# Patient Record
Sex: FEMALE | Race: WHITE | Employment: FULL TIME | URBAN - METROPOLITAN AREA
[De-identification: names, ages, dates, MRNs, and addresses within clinical notes are randomized per-mention and may not be internally consistent; named-entity substitution may affect disease eponyms.]

---

## 2017-01-07 ENCOUNTER — TELEPHONE (OUTPATIENT)
Dept: FAMILY MEDICINE CLINIC | Facility: CLINIC | Age: 57
End: 2017-01-07

## 2017-01-07 RX ORDER — LISINOPRIL 40 MG/1
TABLET ORAL
Qty: 30 TABLET | Refills: 0 | Status: SHIPPED | OUTPATIENT
Start: 2017-01-07 | End: 2017-02-09

## 2017-01-07 NOTE — TELEPHONE ENCOUNTER
Requesting Lisinopril refill    Hypertensive Medications  Protocol Criteria:  · Appointment scheduled in the past 6 months or in the next 3 months  · BMP or CMP in the past 12 months  · Creatinine result < 2  Recent Visits       Provider Department Primary

## 2017-01-11 RX ORDER — LISINOPRIL 40 MG/1
40 TABLET ORAL
Qty: 30 TABLET | Refills: 2 | Status: SHIPPED | OUTPATIENT
Start: 2017-01-11 | End: 2017-04-26

## 2017-01-11 NOTE — TELEPHONE ENCOUNTER
From: Barbara White  To: Van Garcia MD  Sent: 1/7/2017 10:21 AM CST  Subject: Medication Renewal Request    Original authorizing provider: Nae Philippe, [address]  would like a refill of the following medications:  LISINOPRIL 40 MG Or

## 2017-01-16 NOTE — TELEPHONE ENCOUNTER
Dr. Fabi Velázquez, looks like this patient has scheduled an appointment with you on 1/26/17 @8:30am

## 2017-01-26 ENCOUNTER — APPOINTMENT (OUTPATIENT)
Dept: LAB | Age: 57
End: 2017-01-26
Attending: FAMILY MEDICINE
Payer: COMMERCIAL

## 2017-01-26 ENCOUNTER — OFFICE VISIT (OUTPATIENT)
Dept: FAMILY MEDICINE CLINIC | Facility: CLINIC | Age: 57
End: 2017-01-26

## 2017-01-26 VITALS
SYSTOLIC BLOOD PRESSURE: 109 MMHG | DIASTOLIC BLOOD PRESSURE: 70 MMHG | HEIGHT: 64.53 IN | HEART RATE: 83 BPM | TEMPERATURE: 99 F | BODY MASS INDEX: 33.02 KG/M2 | WEIGHT: 195.81 LBS | RESPIRATION RATE: 16 BRPM

## 2017-01-26 DIAGNOSIS — Z00.00 ROUTINE PHYSICAL EXAMINATION: ICD-10-CM

## 2017-01-26 DIAGNOSIS — E78.00 HYPERCHOLESTEROLEMIA: ICD-10-CM

## 2017-01-26 DIAGNOSIS — M25.511 RIGHT SHOULDER PAIN, UNSPECIFIED CHRONICITY: ICD-10-CM

## 2017-01-26 DIAGNOSIS — E66.9 NON MORBID OBESITY, UNSPECIFIED OBESITY TYPE: ICD-10-CM

## 2017-01-26 DIAGNOSIS — Z11.59 ENCOUNTER FOR HEPATITIS C SCREENING TEST FOR LOW RISK PATIENT: ICD-10-CM

## 2017-01-26 DIAGNOSIS — J45.20 MILD INTERMITTENT ASTHMA WITHOUT COMPLICATION: ICD-10-CM

## 2017-01-26 DIAGNOSIS — Z12.31 ENCOUNTER FOR SCREENING MAMMOGRAM FOR BREAST CANCER: Primary | ICD-10-CM

## 2017-01-26 DIAGNOSIS — G56.91 NEUROPATHY OF RIGHT UPPER EXTREMITY: ICD-10-CM

## 2017-01-26 DIAGNOSIS — I10 ESSENTIAL HYPERTENSION WITH GOAL BLOOD PRESSURE LESS THAN 140/90: ICD-10-CM

## 2017-01-26 LAB
ALBUMIN SERPL BCP-MCNC: 4.3 G/DL (ref 3.5–4.8)
ALBUMIN/GLOB SERPL: 1.7 {RATIO} (ref 1–2)
ALP SERPL-CCNC: 118 U/L (ref 32–100)
ALT SERPL-CCNC: 23 U/L (ref 14–54)
ANION GAP SERPL CALC-SCNC: 9 MMOL/L (ref 0–18)
AST SERPL-CCNC: 20 U/L (ref 15–41)
BILIRUB SERPL-MCNC: 0.9 MG/DL (ref 0.3–1.2)
BUN SERPL-MCNC: 9 MG/DL (ref 8–20)
BUN/CREAT SERPL: 13.6 (ref 10–20)
CALCIUM SERPL-MCNC: 9.7 MG/DL (ref 8.5–10.5)
CHLORIDE SERPL-SCNC: 103 MMOL/L (ref 95–110)
CHOLEST SERPL-MCNC: 205 MG/DL (ref 110–200)
CO2 SERPL-SCNC: 27 MMOL/L (ref 22–32)
CREAT SERPL-MCNC: 0.66 MG/DL (ref 0.5–1.5)
GLOBULIN PLAS-MCNC: 2.5 G/DL (ref 2.5–3.7)
GLUCOSE SERPL-MCNC: 95 MG/DL (ref 70–99)
HDLC SERPL-MCNC: 51 MG/DL
LDLC SERPL CALC-MCNC: 110 MG/DL (ref 0–99)
NONHDLC SERPL-MCNC: 154 MG/DL
OSMOLALITY UR CALC.SUM OF ELEC: 286 MOSM/KG (ref 275–295)
POTASSIUM SERPL-SCNC: 3.8 MMOL/L (ref 3.3–5.1)
PROT SERPL-MCNC: 6.8 G/DL (ref 5.9–8.4)
SODIUM SERPL-SCNC: 139 MMOL/L (ref 136–144)
TRIGL SERPL-MCNC: 219 MG/DL (ref 1–149)
TSH SERPL-ACNC: 1.58 UIU/ML (ref 0.34–5.6)

## 2017-01-26 PROCEDURE — 80061 LIPID PANEL: CPT

## 2017-01-26 PROCEDURE — 36415 COLL VENOUS BLD VENIPUNCTURE: CPT

## 2017-01-26 PROCEDURE — 86803 HEPATITIS C AB TEST: CPT

## 2017-01-26 PROCEDURE — 99396 PREV VISIT EST AGE 40-64: CPT | Performed by: FAMILY MEDICINE

## 2017-01-26 PROCEDURE — 80053 COMPREHEN METABOLIC PANEL: CPT

## 2017-01-26 PROCEDURE — 84443 ASSAY THYROID STIM HORMONE: CPT

## 2017-01-26 PROCEDURE — 90471 IMMUNIZATION ADMIN: CPT | Performed by: FAMILY MEDICINE

## 2017-01-26 PROCEDURE — 90715 TDAP VACCINE 7 YRS/> IM: CPT | Performed by: FAMILY MEDICINE

## 2017-01-26 NOTE — PROGRESS NOTES
HPI:    Patient ID: Leslie Settler is a 61year old female. HPI    Review of Systems   Constitutional: Negative. Respiratory: Negative. Cardiovascular: Negative. Gastrointestinal: Negative. Musculoskeletal: Positive for arthralgias.    Skin irregular pigmented nevus unchanged from previous exams.               ASSESSMENT/PLAN:   Routine physical examination  Encounter for screening mammogram for breast cancer  (primary encounter diagnosis)  Essential hypertension with goal blood pressure less USE  ORTHOPEDIC - INTERNAL  LIZBETH SCREENING BILAT (CPT=77067)       XW#[id number]

## 2017-01-27 LAB — HCV AB SERPL QL IA: NONREACTIVE

## 2017-02-07 RX ORDER — VERAPAMIL HYDROCHLORIDE 240 MG/1
TABLET, FILM COATED, EXTENDED RELEASE ORAL
Qty: 90 TABLET | Refills: 3 | Status: SHIPPED | OUTPATIENT
Start: 2017-02-07 | End: 2017-09-01 | Stop reason: SINTOL

## 2017-02-09 PROBLEM — M25.511 ACUTE PAIN OF RIGHT SHOULDER: Status: ACTIVE | Noted: 2017-02-09

## 2017-02-09 PROBLEM — M75.101 ROTATOR CUFF SYNDROME, RIGHT: Status: ACTIVE | Noted: 2017-02-09

## 2017-02-09 RX ORDER — LISINOPRIL 40 MG/1
TABLET ORAL
Qty: 30 TABLET | Refills: 11 | Status: SHIPPED | OUTPATIENT
Start: 2017-02-09 | End: 2017-07-17

## 2017-04-08 RX ORDER — ATORVASTATIN CALCIUM 10 MG/1
TABLET, FILM COATED ORAL
Qty: 90 TABLET | Refills: 0 | Status: SHIPPED | OUTPATIENT
Start: 2017-04-08 | End: 2017-06-13

## 2017-04-19 RX ORDER — MONTELUKAST SODIUM 10 MG/1
TABLET ORAL
Qty: 30 TABLET | Refills: 11 | Status: SHIPPED | OUTPATIENT
Start: 2017-04-19 | End: 2018-04-10

## 2017-04-19 NOTE — TELEPHONE ENCOUNTER
Refill Protocol Appointment Criteria; PLEASE ADVISE ON REFILL. THANKS.   · Appointment scheduled in the past 6 months or in the next 3 months  Recent Visits       Provider Department Primary Dx    2 months ago Annie Mendez MD [facility], [address]

## 2017-04-27 RX ORDER — LISINOPRIL 40 MG/1
TABLET ORAL
Qty: 30 TABLET | Refills: 2 | Status: SHIPPED | OUTPATIENT
Start: 2017-04-27 | End: 2017-07-15

## 2017-04-27 NOTE — TELEPHONE ENCOUNTER
Hypertensive Medications  Protocol Criteria:  · Appointment scheduled in the past 6 months or in the next 3 months  · BMP or CMP in the past 12 months  · Creatinine result < 2  Recent Visits       Provider Department Primary Dx    3 months ago Jade Campos

## 2017-04-29 ENCOUNTER — LAB ENCOUNTER (OUTPATIENT)
Dept: LAB | Facility: HOSPITAL | Age: 57
End: 2017-04-29
Attending: ORTHOPAEDIC SURGERY
Payer: COMMERCIAL

## 2017-04-29 DIAGNOSIS — Z01.810 PRE-OPERATIVE CARDIOVASCULAR EXAMINATION: Primary | ICD-10-CM

## 2017-04-29 DIAGNOSIS — Z01.818 PRE-PROCEDURAL EXAMINATION: ICD-10-CM

## 2017-04-29 PROCEDURE — 80048 BASIC METABOLIC PNL TOTAL CA: CPT

## 2017-04-29 PROCEDURE — 93010 ELECTROCARDIOGRAM REPORT: CPT | Performed by: ORTHOPAEDIC SURGERY

## 2017-04-29 PROCEDURE — 93005 ELECTROCARDIOGRAM TRACING: CPT

## 2017-04-29 PROCEDURE — 36415 COLL VENOUS BLD VENIPUNCTURE: CPT

## 2017-05-09 RX ORDER — ATORVASTATIN CALCIUM 10 MG/1
TABLET, FILM COATED ORAL
Qty: 90 TABLET | Refills: 3 | Status: SHIPPED | OUTPATIENT
Start: 2017-05-09 | End: 2018-07-26

## 2017-05-22 PROBLEM — Z47.89 SURGICAL AFTERCARE, MUSCULOSKELETAL SYSTEM: Status: ACTIVE | Noted: 2017-05-22

## 2017-06-12 ENCOUNTER — OFFICE VISIT (OUTPATIENT)
Dept: FAMILY MEDICINE CLINIC | Facility: CLINIC | Age: 57
End: 2017-06-12

## 2017-06-12 VITALS
BODY MASS INDEX: 33.29 KG/M2 | HEIGHT: 64 IN | HEART RATE: 85 BPM | TEMPERATURE: 98 F | OXYGEN SATURATION: 99 % | WEIGHT: 195 LBS | RESPIRATION RATE: 14 BRPM | DIASTOLIC BLOOD PRESSURE: 88 MMHG | SYSTOLIC BLOOD PRESSURE: 130 MMHG

## 2017-06-12 DIAGNOSIS — R30.0 DYSURIA: Primary | ICD-10-CM

## 2017-06-12 DIAGNOSIS — N39.0 URINARY TRACT INFECTION WITH HEMATURIA, SITE UNSPECIFIED: ICD-10-CM

## 2017-06-12 DIAGNOSIS — R31.9 URINARY TRACT INFECTION WITH HEMATURIA, SITE UNSPECIFIED: ICD-10-CM

## 2017-06-12 PROCEDURE — 99213 OFFICE O/P EST LOW 20 MIN: CPT | Performed by: PHYSICIAN ASSISTANT

## 2017-06-12 PROCEDURE — 87186 SC STD MICRODIL/AGAR DIL: CPT | Performed by: PHYSICIAN ASSISTANT

## 2017-06-12 PROCEDURE — 81003 URINALYSIS AUTO W/O SCOPE: CPT | Performed by: PHYSICIAN ASSISTANT

## 2017-06-12 PROCEDURE — 87086 URINE CULTURE/COLONY COUNT: CPT | Performed by: PHYSICIAN ASSISTANT

## 2017-06-12 PROCEDURE — 87077 CULTURE AEROBIC IDENTIFY: CPT | Performed by: PHYSICIAN ASSISTANT

## 2017-06-12 RX ORDER — CIPROFLOXACIN 500 MG/1
500 TABLET, FILM COATED ORAL 2 TIMES DAILY
Qty: 10 TABLET | Refills: 0 | Status: SHIPPED | OUTPATIENT
Start: 2017-06-12 | End: 2017-06-17

## 2017-06-12 NOTE — PROGRESS NOTES
CHIEF COMPLAINT:   Patient presents with:  UTI: x last night, worsening      HPI:   Cathy Kaur is a 61year old female who presents with symptoms of UTI. Complaining of urinary frequency, urgency, dysuria since last night.  Symptoms have been worsen Comment: moderate intake, occasionally        REVIEW OF SYSTEMS:   GENERAL: Denies fever, chills, or body aches; normal appetite  SKIN: no rashes, no skin wounds or ulcers.   CARDIOVASCULAR: denies chest pain or palpitations  LUNGS: denies shortness total) by mouth 2 (two) times daily. Risk and benefits of medication discussed. Stressed importance of completing full course of antibiotic. The patient indicates understanding of these issues and agrees to the plan.   The patient is asked to foll

## 2017-06-12 NOTE — PATIENT INSTRUCTIONS
We will send out a urine culture and contact you with the results  Please follow up with your PCP if no improvement within 5-7 days. Go directly to the ER for any acute worsening of symptoms. · Complete full course of antibiotics.   · Over the counter Tyl

## 2017-06-13 RX ORDER — ATORVASTATIN CALCIUM 10 MG/1
10 TABLET, FILM COATED ORAL
Qty: 90 TABLET | Refills: 3 | Status: CANCELLED | OUTPATIENT
Start: 2017-06-13

## 2017-06-13 RX ORDER — ATORVASTATIN CALCIUM 10 MG/1
TABLET, FILM COATED ORAL
Qty: 90 TABLET | Refills: 3 | Status: SHIPPED | OUTPATIENT
Start: 2017-06-13 | End: 2017-09-20

## 2017-07-17 RX ORDER — LISINOPRIL 40 MG/1
TABLET ORAL
Qty: 30 TABLET | Refills: 11 | Status: SHIPPED | OUTPATIENT
Start: 2017-07-17 | End: 2018-06-15

## 2017-07-24 ENCOUNTER — PATIENT MESSAGE (OUTPATIENT)
Dept: FAMILY MEDICINE CLINIC | Facility: CLINIC | Age: 57
End: 2017-07-24

## 2017-07-26 NOTE — TELEPHONE ENCOUNTER
From: Maverick Lowe  To: Patito Rocha MD  Sent: 7/24/2017 10:50 AM CDT  Subject: Non-Urgent Medical Question    ,  I've been having pain behind my right knee that started last week on 7/16.  I'm wondering if I should have you take a look at i

## 2017-07-26 NOTE — TELEPHONE ENCOUNTER
Pt contacted. Informed per Dr. Yuliya Enriquez should schedule appt. Appt scheduled for 7/28/17 at 1345. Pt states right knee pain is doing better as of now, but does agree to be seen by Dr. Yuliya Enriquez as recommended.  Pt verbalized understanding with agreement to appt

## 2017-08-03 ENCOUNTER — OFFICE VISIT (OUTPATIENT)
Dept: FAMILY MEDICINE CLINIC | Facility: CLINIC | Age: 57
End: 2017-08-03

## 2017-08-03 VITALS
HEART RATE: 78 BPM | TEMPERATURE: 99 F | RESPIRATION RATE: 16 BRPM | SYSTOLIC BLOOD PRESSURE: 114 MMHG | DIASTOLIC BLOOD PRESSURE: 86 MMHG | OXYGEN SATURATION: 98 %

## 2017-08-03 DIAGNOSIS — L30.9 DERMATITIS: Primary | ICD-10-CM

## 2017-08-03 PROCEDURE — 99213 OFFICE O/P EST LOW 20 MIN: CPT | Performed by: NURSE PRACTITIONER

## 2017-08-03 RX ORDER — FLUTICASONE PROPIONATE 0.05 %
1 CREAM (GRAM) TOPICAL 2 TIMES DAILY
Qty: 30 G | Refills: 0 | Status: SHIPPED | OUTPATIENT
Start: 2017-08-03 | End: 2017-09-20

## 2017-08-03 NOTE — PROGRESS NOTES
CHIEF COMPLAINT:   No chief complaint on file. HPI:   Michelle Christie is a 61year old female who presents for evaluation of a rash. Per patient rash started in the past 7 days.  Starated as a bite on right forearm Rash has been progressing since 2011: COLONOSCOPY & POLYPECTOMY      Comment: colonoscopy with polypectomy, repat in 5 yr;                colonic polyps, tubular adenoma  2013: PERCUTANEOUS LUMBAR DISKECTOMY      Comment: discectomy; Lumbar disc herniation   Family History   Problem Rela EYES: PERRLA, EOMI, conjunctiva are clear  HENT: Head atraumatic, normocephalic. TM's WNL bilaterally. Normal external nose. Nasal mucosa pink without edema. No erythema of the throat. Oropharynx moist without lesions.   LUNGS: Clear to auscultation bilater · Chemicals in makeup, soap, laundry detergent, perfume, acne cream, and hair products  · Certain medicines, such as neomycin, bacitracin, benzocaine, and thimerosal  · Metals such as nickel, found in some jewelry and watch bands   · The sticky material on Talk with your healthcare provider about what may have caused your contact dermatitis. Patch testing may help you figure out what caused the rash so you can avoid further contact with it.  Once you learn what caused your rash, make sure to avoid that substa The health care provider may prescribe medications to relieve swelling and itching. Follow all instructions when using these medications. · Avoid anything that heats up your skin, such as hot showers or baths, or direct sunlight.  This can make itching wor · Spreading of the rash to other parts of your body  · Severe swelling of your face, eyelids, mouth, throat or tongue  · Trouble urinating due to swelling in the genital area  · Fever of 100.4°F (38°C) or higher  · Redness or swelling that gets worse  · Pa

## 2017-08-03 NOTE — PATIENT INSTRUCTIONS
Understanding Contact Dermatitis     A cool, moist compress can help reduce itching. Contact dermatitis is a common type of skin rash. It’s caused by something that touches the skin and makes it irritated and inflamed.  It can occur on skin on any par · Cool, moist compress. Use a clean damp cloth. Put it on the area for 20 to 30 minutes, 5 to 6 times a day for the first 3 days. · Steroid cream or ointment. You can apply this medicine several times a day on clean skin. · Oral corticosteroid.  Your heal Dermatitis is a skin rash caused by something that touches the skin and makes it irritated and inflamed.  Your skin may be red, swollen, dry, and may be cracked. Blisters may form and ooze. The rash will itch.   Dermatitis can form on the face and neck, sharla · Use hydrocortisone cream for redness and irritation, unless another medicine was prescribed. You can also use benzocaine anesthetic cream or spray. · Use oral diphenhydramine to help reduce itching.  This is an antihistamine you can buy at [facility]

## 2017-08-10 RX ORDER — METOPROLOL SUCCINATE 100 MG/1
TABLET, EXTENDED RELEASE ORAL
Qty: 90 TABLET | Refills: 0 | Status: SHIPPED | OUTPATIENT
Start: 2017-08-10 | End: 2017-11-09

## 2017-09-01 ENCOUNTER — OFFICE VISIT (OUTPATIENT)
Dept: FAMILY MEDICINE CLINIC | Facility: CLINIC | Age: 57
End: 2017-09-01

## 2017-09-01 VITALS
HEART RATE: 87 BPM | BODY MASS INDEX: 33.87 KG/M2 | TEMPERATURE: 98 F | DIASTOLIC BLOOD PRESSURE: 76 MMHG | SYSTOLIC BLOOD PRESSURE: 123 MMHG | WEIGHT: 198.38 LBS | HEIGHT: 64 IN | RESPIRATION RATE: 16 BRPM

## 2017-09-01 DIAGNOSIS — M75.101 ROTATOR CUFF SYNDROME, RIGHT: ICD-10-CM

## 2017-09-01 DIAGNOSIS — I10 ESSENTIAL HYPERTENSION: Primary | ICD-10-CM

## 2017-09-01 DIAGNOSIS — R35.0 FREQUENCY OF URINATION: ICD-10-CM

## 2017-09-01 DIAGNOSIS — M25.473 ANKLE SWELLING, UNSPECIFIED LATERALITY: ICD-10-CM

## 2017-09-01 PROCEDURE — 99214 OFFICE O/P EST MOD 30 MIN: CPT | Performed by: FAMILY MEDICINE

## 2017-09-01 PROCEDURE — 99212 OFFICE O/P EST SF 10 MIN: CPT | Performed by: FAMILY MEDICINE

## 2017-09-01 RX ORDER — FUROSEMIDE 20 MG/1
20 TABLET ORAL 2 TIMES DAILY
Qty: 30 TABLET | Refills: 2 | Status: SHIPPED | OUTPATIENT
Start: 2017-09-01 | End: 2017-09-13

## 2017-09-01 RX ORDER — NITROFURANTOIN 25; 75 MG/1; MG/1
100 CAPSULE ORAL 2 TIMES DAILY
Qty: 10 CAPSULE | Refills: 0 | Status: SHIPPED | OUTPATIENT
Start: 2017-09-01 | End: 2017-09-06

## 2017-09-01 NOTE — PROGRESS NOTES
HPI:    Patient ID: Marysol Beltran is a 61year old female. Swelling   This is a new problem. The current episode started 1 to 4 weeks ago. The problem occurs intermittently. The problem has been waxing and waning.  Associated symptoms include joint sw Comments)    Comment:Adhesive/suture glue -blisters  Penicillins             Rash  Sulfa Antibiotics       Rash  Sulfanilamide           Rash   PHYSICAL EXAM:   Physical Exam   Constitutional: She is oriented to person, place, and time.  She appears well-de daily.      Nitrofurantoin Monohyd Macro (MACROBID) 100 MG Oral Cap 10 capsule 0      Sig: Take 1 capsule (100 mg total) by mouth 2 (two) times daily.            Imaging & Referrals:  ORTHOPEDIC - INTERNAL       #[id number]

## 2017-09-04 DIAGNOSIS — R82.90 ABNORMAL URINALYSIS: Primary | ICD-10-CM

## 2017-09-06 ENCOUNTER — APPOINTMENT (OUTPATIENT)
Dept: LAB | Age: 57
End: 2017-09-06
Attending: FAMILY MEDICINE
Payer: COMMERCIAL

## 2017-09-06 DIAGNOSIS — R82.90 ABNORMAL URINALYSIS: ICD-10-CM

## 2017-09-06 LAB
BACTERIA UR QL AUTO: NEGATIVE /HPF
BILIRUB UR QL: NEGATIVE
COLOR UR: YELLOW
GLUCOSE UR-MCNC: NEGATIVE MG/DL
HGB UR QL STRIP.AUTO: NEGATIVE
KETONES UR-MCNC: NEGATIVE MG/DL
NITRITE UR QL STRIP.AUTO: NEGATIVE
PH UR: 6 [PH] (ref 5–8)
PROT UR-MCNC: NEGATIVE MG/DL
RBC #/AREA URNS AUTO: <1 /HPF
SP GR UR STRIP: 1.01 (ref 1–1.03)
UROBILINOGEN UR STRIP-ACNC: <2
VIT C UR-MCNC: NEGATIVE MG/DL
WBC #/AREA URNS AUTO: 1 /HPF

## 2017-09-06 PROCEDURE — 81001 URINALYSIS AUTO W/SCOPE: CPT

## 2017-09-12 ENCOUNTER — PATIENT MESSAGE (OUTPATIENT)
Dept: FAMILY MEDICINE CLINIC | Facility: CLINIC | Age: 57
End: 2017-09-12

## 2017-09-13 ENCOUNTER — TELEPHONE (OUTPATIENT)
Dept: OTHER | Age: 57
End: 2017-09-13

## 2017-09-13 DIAGNOSIS — M25.471 BILATERAL ANKLE EFFUSIONS: ICD-10-CM

## 2017-09-13 DIAGNOSIS — M25.472 BILATERAL ANKLE EFFUSIONS: ICD-10-CM

## 2017-09-13 DIAGNOSIS — M25.572 BILATERAL ANKLE JOINT PAIN: Primary | ICD-10-CM

## 2017-09-13 DIAGNOSIS — M25.571 BILATERAL ANKLE JOINT PAIN: Primary | ICD-10-CM

## 2017-09-13 NOTE — TELEPHONE ENCOUNTER
Please let patient know I recommend restarting verapamil, discontinue furosemide, continue lisinopril. Please inform patient and change medication list, send a refill if needed. Follow-up with me in about 1 month.   In the meantime she should have made ap

## 2017-09-13 NOTE — TELEPHONE ENCOUNTER
From: Yousif Mendiola  To: Cady Lance MD  Sent: 9/12/2017 9:36 AM CDT  Subject: Visit Follow-up Question    Dr. Eng Portal,  After stopping the Verapamil on 9/1 and started taking the\"water pill\" there hasn't been a change in my ankles swelling.  I stil

## 2017-09-13 NOTE — TELEPHONE ENCOUNTER
Spoke with pt and verified pt . Pt given Dr Rei Kearns recommendations and pt verb understanding. Pt states she has not made appointment with Dr Cintia Herrera as she needs a referral.  She had one for Dr Nidia Monahan, but not Dr Cintia Herrera.     Referral pende

## 2017-09-13 NOTE — TELEPHONE ENCOUNTER
Please see pt my chart message and advise.                             From: Kaela Brumfield  To: Nichole Fam MD  Sent: 9/12/2017  9:36 AM CDT  Subject: Visit Follow-up Question    Dr. Hema Bains,  After stopping the Verapamil  on 9/1 and started taking th

## 2017-09-19 ENCOUNTER — OFFICE VISIT (OUTPATIENT)
Dept: ORTHOPEDICS CLINIC | Facility: CLINIC | Age: 57
End: 2017-09-19

## 2017-09-19 VITALS — RESPIRATION RATE: 16 BRPM | SYSTOLIC BLOOD PRESSURE: 124 MMHG | DIASTOLIC BLOOD PRESSURE: 82 MMHG | HEART RATE: 82 BPM

## 2017-09-19 DIAGNOSIS — IMO0002 DISORDER OF ROTATOR CUFF SYNDROME OF RIGHT SHOULDER AND ALLIED DISORDER: Primary | ICD-10-CM

## 2017-09-19 DIAGNOSIS — M25.511 CHRONIC RIGHT SHOULDER PAIN: ICD-10-CM

## 2017-09-19 DIAGNOSIS — G89.29 CHRONIC RIGHT SHOULDER PAIN: ICD-10-CM

## 2017-09-19 PROCEDURE — 20610 DRAIN/INJ JOINT/BURSA W/O US: CPT | Performed by: ORTHOPAEDIC SURGERY

## 2017-09-19 PROCEDURE — 99213 OFFICE O/P EST LOW 20 MIN: CPT | Performed by: ORTHOPAEDIC SURGERY

## 2017-09-19 PROCEDURE — 99212 OFFICE O/P EST SF 10 MIN: CPT | Performed by: ORTHOPAEDIC SURGERY

## 2017-09-19 RX ORDER — TRIAMCINOLONE ACETONIDE 40 MG/ML
40 INJECTION, SUSPENSION INTRA-ARTICULAR; INTRAMUSCULAR ONCE
Status: COMPLETED | OUTPATIENT
Start: 2017-09-19 | End: 2017-09-19

## 2017-09-19 RX ADMIN — TRIAMCINOLONE ACETONIDE 40 MG: 40 INJECTION, SUSPENSION INTRA-ARTICULAR; INTRAMUSCULAR at 12:16:00

## 2017-09-19 NOTE — PROGRESS NOTES
9/19/2017  [facility]  86/1960  61year old   female  Eliu James MD    HPI:   Patient presents with:  Shoulder Pain: Pt is here for right shoulder pain Did get cortison injectio to the right shoulder in Feb. Started back with pain in June or patient has no pain with cross body adduction localized to the acromioclavicular joint. The patient has a negative Speed's test and negative Menard test.  The patient has no evidence of generalized laxity.     ASSESSMENT AND PLAN:   Disorder of rotator cu

## 2017-09-19 NOTE — PROCEDURES
The patient desired injection. Under sterile conditions and following informed consent, the patient was injected with lidocaine and kenalog in their right shoulder subacromial space.   The patient tolerated the procedure well and there were no complication

## 2017-09-19 NOTE — PROGRESS NOTES
Per verbal order from JL, draw up 8ml 1% lidocaine and 2ml of Kenalog 10 for cortisone injection to right shoulder. VS prior to injection 80-/74 Time out progressed. Pt given steroid information sheet.  Pt signed consent for right shoulder steroid inj

## 2017-11-09 RX ORDER — METOPROLOL SUCCINATE 100 MG/1
TABLET, EXTENDED RELEASE ORAL
Qty: 90 TABLET | Refills: 3 | Status: SHIPPED | OUTPATIENT
Start: 2017-11-09 | End: 2018-10-16

## 2017-11-10 ENCOUNTER — OFFICE VISIT (OUTPATIENT)
Dept: FAMILY MEDICINE CLINIC | Facility: CLINIC | Age: 57
End: 2017-11-10

## 2017-11-10 VITALS
HEART RATE: 73 BPM | RESPIRATION RATE: 16 BRPM | TEMPERATURE: 98 F | BODY MASS INDEX: 35 KG/M2 | DIASTOLIC BLOOD PRESSURE: 77 MMHG | SYSTOLIC BLOOD PRESSURE: 123 MMHG | WEIGHT: 201 LBS

## 2017-11-10 DIAGNOSIS — R10.12 LUQ PAIN: Primary | ICD-10-CM

## 2017-11-10 PROCEDURE — 99212 OFFICE O/P EST SF 10 MIN: CPT | Performed by: FAMILY MEDICINE

## 2017-11-10 PROCEDURE — 90686 IIV4 VACC NO PRSV 0.5 ML IM: CPT | Performed by: FAMILY MEDICINE

## 2017-11-10 PROCEDURE — 99213 OFFICE O/P EST LOW 20 MIN: CPT | Performed by: FAMILY MEDICINE

## 2017-11-10 PROCEDURE — 90471 IMMUNIZATION ADMIN: CPT | Performed by: FAMILY MEDICINE

## 2017-11-10 NOTE — PROGRESS NOTES
HPI: Nallely Montana is a 61year old female who presents for left upper quadrant pain. Started few months ago. Hurts to tough area. States she now gets sharp pains at times. No shortness of breath. Denies heartburn, indigestion. Feels worse when stomach is empty. (primary encounter diagnosis)     Has been present for the past few months and seems to be worsening. Will get CT abdomen. If negative, consider GI.     Bibi Juan, DO

## 2017-11-18 ENCOUNTER — HOSPITAL ENCOUNTER (OUTPATIENT)
Dept: CT IMAGING | Facility: HOSPITAL | Age: 57
Discharge: HOME | End: 2017-11-18
Attending: FAMILY MEDICINE
Payer: COMMERCIAL

## 2017-11-18 DIAGNOSIS — R10.12 LUQ PAIN: ICD-10-CM

## 2017-11-18 PROCEDURE — 74160 CT ABDOMEN W/CONTRAST: CPT | Performed by: FAMILY MEDICINE

## 2017-11-18 PROCEDURE — 82565 ASSAY OF CREATININE: CPT

## 2017-11-25 ENCOUNTER — HOSPITAL ENCOUNTER (OUTPATIENT)
Dept: MAMMOGRAPHY | Facility: HOSPITAL | Age: 57
Discharge: HOME | End: 2017-11-25
Attending: FAMILY MEDICINE
Payer: COMMERCIAL

## 2017-11-25 DIAGNOSIS — Z12.31 ENCOUNTER FOR SCREENING MAMMOGRAM FOR BREAST CANCER: ICD-10-CM

## 2017-11-25 PROCEDURE — 77067 SCR MAMMO BI INCL CAD: CPT | Performed by: FAMILY MEDICINE

## 2017-12-10 ENCOUNTER — PATIENT MESSAGE (OUTPATIENT)
Dept: FAMILY MEDICINE CLINIC | Facility: CLINIC | Age: 57
End: 2017-12-10

## 2017-12-11 RX ORDER — VERAPAMIL HYDROCHLORIDE 240 MG/1
240 TABLET, FILM COATED, EXTENDED RELEASE ORAL
Qty: 90 TABLET | Refills: 3 | Status: SHIPPED | OUTPATIENT
Start: 2017-12-11 | End: 2018-11-14

## 2017-12-11 NOTE — TELEPHONE ENCOUNTER
Pt states her leg swelling wasn't from the verapamil - would like the med to be restarted    Med pended for approval

## 2017-12-11 NOTE — TELEPHONE ENCOUNTER
From: Radha Guy  To: Sarai Jacobo MD  Sent: 12/10/2017 9:35 AM CST  Subject: Prescription Question    I have been taking Verapamil and need a refill however it’s not on the list of medications.  I stopped taking it for ankle swelling but it wasn’t

## 2017-12-20 ENCOUNTER — OFFICE VISIT (OUTPATIENT)
Dept: FAMILY MEDICINE CLINIC | Facility: CLINIC | Age: 57
End: 2017-12-20

## 2017-12-20 VITALS
DIASTOLIC BLOOD PRESSURE: 88 MMHG | RESPIRATION RATE: 16 BRPM | HEART RATE: 78 BPM | OXYGEN SATURATION: 98 % | SYSTOLIC BLOOD PRESSURE: 138 MMHG | TEMPERATURE: 98 F

## 2017-12-20 DIAGNOSIS — B02.9 HERPES ZOSTER WITHOUT COMPLICATION: Primary | ICD-10-CM

## 2017-12-20 PROCEDURE — 99213 OFFICE O/P EST LOW 20 MIN: CPT | Performed by: NURSE PRACTITIONER

## 2017-12-20 RX ORDER — ACYCLOVIR 800 MG/1
800 TABLET ORAL
Qty: 35 TABLET | Refills: 0 | Status: SHIPPED | OUTPATIENT
Start: 2017-12-20 | End: 2017-12-27

## 2017-12-20 NOTE — PROGRESS NOTES
CHIEF COMPLAINT:   No chief complaint on file. HPI:   Marysol Beltran is a 61year old female who presents for evaluation of a rash. Per patient rash started in the past 1 days. Rash has been progressing since onset.   Patient has not had similar Comment: colonoscopy with polypectomy, repat in 5 yr;                colonic polyps, tubular adenoma  2013: PERCUTANEOUS LUMBAR DISKECTOMY      Comment: discectomy; Lumbar disc herniation   Family History   Problem Relation Age of Onset   • Heart Disor SKIN: Rash/lesion(s): erythematous maculopapular rash with vesicles to dermatomal L4 area. Approx 4cm-2cm area. Consistent with herpes zoster. No s/s secondary infection  EYES: PERRLA, EOMI, conjunctiva are clear  HENT: Head atraumatic, normocephalic. Shingles is a viral infection caused by the same virus as chicken pox. Anyone who has had chicken pox may get shingles later in life. The virus stays in the body, but remains dormant (asleep).  Shingles often occurs in older persons or persons with lowered · Trim fingernails and try not to scratch. Scratching the sores may leave scars. · Stay home from work or school until all blisters have formed a crust and you are no longer contagious.   Follow-up care  Follow up with your healthcare provider or as direct

## 2018-01-06 ENCOUNTER — OFFICE VISIT (OUTPATIENT)
Dept: FAMILY MEDICINE CLINIC | Facility: CLINIC | Age: 58
End: 2018-01-06

## 2018-01-06 VITALS
TEMPERATURE: 99 F | OXYGEN SATURATION: 98 % | RESPIRATION RATE: 16 BRPM | SYSTOLIC BLOOD PRESSURE: 120 MMHG | DIASTOLIC BLOOD PRESSURE: 78 MMHG | HEART RATE: 87 BPM

## 2018-01-06 DIAGNOSIS — J98.01 BRONCHOSPASM, ACUTE: ICD-10-CM

## 2018-01-06 DIAGNOSIS — J06.9 UPPER RESPIRATORY VIRUS: Primary | ICD-10-CM

## 2018-01-06 PROCEDURE — 99213 OFFICE O/P EST LOW 20 MIN: CPT | Performed by: NURSE PRACTITIONER

## 2018-01-06 RX ORDER — PREDNISONE 20 MG/1
40 TABLET ORAL DAILY
Qty: 10 TABLET | Refills: 0 | Status: SHIPPED | OUTPATIENT
Start: 2018-01-06 | End: 2018-01-11

## 2018-01-06 RX ORDER — FLUTICASONE PROPIONATE 50 MCG
2 SPRAY, SUSPENSION (ML) NASAL DAILY
Qty: 1 BOTTLE | Refills: 0 | Status: SHIPPED | OUTPATIENT
Start: 2018-01-06 | End: 2019-01-01

## 2018-01-06 RX ORDER — ALBUTEROL SULFATE 90 UG/1
2 AEROSOL, METERED RESPIRATORY (INHALATION) EVERY 4 HOURS PRN
Qty: 1 INHALER | Refills: 0 | Status: SHIPPED | OUTPATIENT
Start: 2018-01-06 | End: 2018-02-09

## 2018-02-09 ENCOUNTER — OFFICE VISIT (OUTPATIENT)
Dept: FAMILY MEDICINE CLINIC | Facility: CLINIC | Age: 58
End: 2018-02-09

## 2018-02-09 VITALS
RESPIRATION RATE: 17 BRPM | WEIGHT: 197.81 LBS | SYSTOLIC BLOOD PRESSURE: 130 MMHG | HEART RATE: 86 BPM | TEMPERATURE: 99 F | DIASTOLIC BLOOD PRESSURE: 78 MMHG | HEIGHT: 64 IN | BODY MASS INDEX: 33.77 KG/M2

## 2018-02-09 DIAGNOSIS — J45.20 MILD INTERMITTENT ASTHMA WITHOUT COMPLICATION: ICD-10-CM

## 2018-02-09 DIAGNOSIS — Z01.419 ENCOUNTER FOR GYNECOLOGICAL EXAMINATION WITHOUT ABNORMAL FINDING: Primary | ICD-10-CM

## 2018-02-09 DIAGNOSIS — I10 ESSENTIAL HYPERTENSION: ICD-10-CM

## 2018-02-09 DIAGNOSIS — E66.9 NON MORBID OBESITY, UNSPECIFIED OBESITY TYPE: ICD-10-CM

## 2018-02-09 DIAGNOSIS — M75.101 ROTATOR CUFF SYNDROME, RIGHT: ICD-10-CM

## 2018-02-09 PROBLEM — Z47.89 SURGICAL AFTERCARE, MUSCULOSKELETAL SYSTEM: Status: RESOLVED | Noted: 2017-05-22 | Resolved: 2018-02-09

## 2018-02-09 PROBLEM — G43.909 MIGRAINES: Status: ACTIVE | Noted: 2018-02-09

## 2018-02-09 LAB
ALBUMIN SERPL BCP-MCNC: 4.4 G/DL (ref 3.5–4.8)
ALBUMIN/GLOB SERPL: 1.8 {RATIO} (ref 1–2)
ALP SERPL-CCNC: 100 U/L (ref 32–100)
ALT SERPL-CCNC: 24 U/L (ref 14–54)
ANION GAP SERPL CALC-SCNC: 9 MMOL/L (ref 0–18)
AST SERPL-CCNC: 24 U/L (ref 15–41)
BILIRUB SERPL-MCNC: 0.5 MG/DL (ref 0.3–1.2)
BUN SERPL-MCNC: 10 MG/DL (ref 8–20)
BUN/CREAT SERPL: 21.3 (ref 10–20)
CALCIUM SERPL-MCNC: 9.4 MG/DL (ref 8.5–10.5)
CHLORIDE SERPL-SCNC: 104 MMOL/L (ref 95–110)
CHOLEST SERPL-MCNC: 208 MG/DL (ref 110–200)
CO2 SERPL-SCNC: 25 MMOL/L (ref 22–32)
CREAT SERPL-MCNC: 0.47 MG/DL (ref 0.5–1.5)
GLOBULIN PLAS-MCNC: 2.5 G/DL (ref 2.5–3.7)
GLUCOSE SERPL-MCNC: 69 MG/DL (ref 70–99)
HDLC SERPL-MCNC: 56 MG/DL
LDLC SERPL CALC-MCNC: 105 MG/DL (ref 0–99)
NONHDLC SERPL-MCNC: 152 MG/DL
OSMOLALITY UR CALC.SUM OF ELEC: 283 MOSM/KG (ref 275–295)
PATIENT FASTING: YES
POTASSIUM SERPL-SCNC: 3.8 MMOL/L (ref 3.3–5.1)
PROT SERPL-MCNC: 6.9 G/DL (ref 5.9–8.4)
SODIUM SERPL-SCNC: 138 MMOL/L (ref 136–144)
TRIGL SERPL-MCNC: 237 MG/DL (ref 1–149)
TSH SERPL-ACNC: 1.54 UIU/ML (ref 0.45–5.33)

## 2018-02-09 PROCEDURE — 99396 PREV VISIT EST AGE 40-64: CPT | Performed by: FAMILY MEDICINE

## 2018-02-09 NOTE — PROGRESS NOTES
HPI:    Patient ID: Marysol Beltran is a 61year old female. HPI    Review of Systems   Constitutional: Negative. Respiratory: Negative. Cardiovascular: Negative. Gastrointestinal: Negative. Musculoskeletal: Positive for arthralgias.    Skin exhibits no edema. Lymphadenopathy:     She has no cervical adenopathy. Neurological: She is alert and oriented to person, place, and time. Skin: Skin is warm and dry.               ASSESSMENT/PLAN:   Encounter for gynecological examination without ab

## 2018-02-13 LAB — HPV I/H RISK 1 DNA SPEC QL NAA+PROBE: NEGATIVE

## 2018-02-23 ENCOUNTER — OFFICE VISIT (OUTPATIENT)
Dept: PHYSICAL THERAPY | Facility: HOSPITAL | Age: 58
End: 2018-02-23
Attending: FAMILY MEDICINE
Payer: COMMERCIAL

## 2018-02-23 DIAGNOSIS — M75.101 ROTATOR CUFF SYNDROME, RIGHT: ICD-10-CM

## 2018-02-23 PROCEDURE — 97110 THERAPEUTIC EXERCISES: CPT

## 2018-02-23 PROCEDURE — 97162 PT EVAL MOD COMPLEX 30 MIN: CPT

## 2018-02-23 NOTE — PROGRESS NOTES
SHOULDER EVALUATION:   Referring Physician: Dr. Danni Bynum  Diagnosis:  Associated DX:  Rotator cuff syndrome, right (M75.101)     Date of Service: 2/23/2018     PATIENT Payton Worrell is a 61year old y/o female who presents to therapy today with Physical Therapy to address the above impairments to decrease pain and improve function     OBJECTIVE:   Observation/Posture: forward head, slouched sitting    Correction of posture: no effect  Palpation: TTP GH joint line on right      Extremity: Right  M ADLs   · Pt will be independent and compliant with comprehensive HEP to maintain progress achieved in PT    Frequency / Duration: Patient will be seen for 2 x/week or a total of 8 visits over a 90 day period. Treatment will include: Manual Therapy;  Chuck Hui

## 2018-02-27 ENCOUNTER — OFFICE VISIT (OUTPATIENT)
Dept: PHYSICAL THERAPY | Facility: HOSPITAL | Age: 58
End: 2018-02-27
Attending: FAMILY MEDICINE
Payer: COMMERCIAL

## 2018-02-27 PROCEDURE — 97140 MANUAL THERAPY 1/> REGIONS: CPT

## 2018-02-27 PROCEDURE — 97110 THERAPEUTIC EXERCISES: CPT

## 2018-02-27 NOTE — PROGRESS NOTES
Diagnosis: Associated DX:  Rotator cuff syndrome, right (M75.101)    Authorized # of Visits:  2/8        Next MD visit: none scheduled  Fall Risk: standard         Precautions: n/a           Medication Changes since last visit?: No  Subjective: \"After las pain  · Pt will be able to put on bra without shoulder pain  · Pt will improve shoulder strength throughout to 5/5 to improve function with ADLs   · Pt will be independent and compliant with comprehensive HEP to maintain progress achieved in PT    Plan: Co

## 2018-03-01 ENCOUNTER — OFFICE VISIT (OUTPATIENT)
Dept: PHYSICAL THERAPY | Facility: HOSPITAL | Age: 58
End: 2018-03-01
Attending: FAMILY MEDICINE
Payer: COMMERCIAL

## 2018-03-01 PROCEDURE — 97110 THERAPEUTIC EXERCISES: CPT

## 2018-03-01 NOTE — PROGRESS NOTES
Diagnosis: Associated DX:  Rotator cuff syndrome, right (M75.101)    Authorized # of Visits:  3/8        Next MD visit: none scheduled  Fall Risk: standard         Precautions: n/a           Medication Changes since last visit?: No  Subjective: \"I feel so pain  · Pt will be able to put on bra without shoulder pain  · Pt will improve shoulder strength throughout to 5/5 to improve function with ADLs   · Pt will be independent and compliant with comprehensive HEP to maintain progress achieved in PT    Plan: Co

## 2018-03-06 ENCOUNTER — OFFICE VISIT (OUTPATIENT)
Dept: PHYSICAL THERAPY | Facility: HOSPITAL | Age: 58
End: 2018-03-06
Attending: FAMILY MEDICINE
Payer: COMMERCIAL

## 2018-03-06 PROCEDURE — 97110 THERAPEUTIC EXERCISES: CPT

## 2018-03-06 NOTE — PROGRESS NOTES
Diagnosis: Associated DX:  Rotator cuff syndrome, right (M75.101)    Authorized # of Visits:  4/8        Next MD visit: none scheduled  Fall Risk: standard         Precautions: n/a           Medication Changes since last visit?: No  Subjective: \"I feel mu exercises, c/o tired and fatigued. Goals:   To be reached in 4 weeks  · Pt will report improved ability to sleep without waking due to shoulder pain  · Pt will be able to put on bra without shoulder pain  · Pt will improve shoulder strength throughout t

## 2018-03-08 ENCOUNTER — APPOINTMENT (OUTPATIENT)
Dept: PHYSICAL THERAPY | Facility: HOSPITAL | Age: 58
End: 2018-03-08
Attending: FAMILY MEDICINE
Payer: COMMERCIAL

## 2018-03-13 ENCOUNTER — APPOINTMENT (OUTPATIENT)
Dept: PHYSICAL THERAPY | Facility: HOSPITAL | Age: 58
End: 2018-03-13
Attending: FAMILY MEDICINE
Payer: COMMERCIAL

## 2018-03-15 ENCOUNTER — PATIENT MESSAGE (OUTPATIENT)
Dept: FAMILY MEDICINE CLINIC | Facility: CLINIC | Age: 58
End: 2018-03-15

## 2018-03-15 ENCOUNTER — NURSE TRIAGE (OUTPATIENT)
Dept: OTHER | Age: 58
End: 2018-03-15

## 2018-03-15 ENCOUNTER — APPOINTMENT (OUTPATIENT)
Dept: PHYSICAL THERAPY | Facility: HOSPITAL | Age: 58
End: 2018-03-15
Attending: FAMILY MEDICINE
Payer: COMMERCIAL

## 2018-03-15 NOTE — TELEPHONE ENCOUNTER
From: Lorna Jeffries  To: Khang Diaz MD  Sent: 3/15/2018  9:30 AM CDT  Subject: Non-Urgent Medical Question    I have been experiencing a buzzing sensation or vibrating feeling lately in my feet.  It usually goes away but today it seems more intense

## 2018-03-15 NOTE — TELEPHONE ENCOUNTER
Reviewed doctor's recommendations with pt, pt would like to schedule appt with Dr. Jaime Merchant at this time. Appt scheduled 3/20/18 as pt needs late appt. Alert and oriented x 3, normal mood and affect, no apparent risk to self or others.

## 2018-03-15 NOTE — TELEPHONE ENCOUNTER
From: Nile Esposito  To: Nelson Lloyd MD  Sent: 3/15/2018 9:30 AM CDT  Subject: Non-Urgent Medical Question    I have been experiencing a buzzing sensation or vibrating feeling lately in my feet. It usually goes away but today it seems more intense.

## 2018-03-15 NOTE — TELEPHONE ENCOUNTER
Please let her know for the symptoms generally need to do an exam, check vitamin B12, blood sugar, thyroid, iron levels. Recommend appointment with me, okay to give an acute.   If she would prefer I can provide neurology referral.

## 2018-03-15 NOTE — TELEPHONE ENCOUNTER
Action Requested: Summary for Provider     []  Critical Lab, Recommendations Needed  [x] Need Additional Advice  []   FYI    []   Need Orders  [] Need Medications Sent to Pharmacy  []  Other     SUMMARY: Contacted patient in regards to [employer] message.    P

## 2018-03-19 ENCOUNTER — APPOINTMENT (OUTPATIENT)
Dept: PHYSICAL THERAPY | Facility: HOSPITAL | Age: 58
End: 2018-03-19
Attending: FAMILY MEDICINE
Payer: COMMERCIAL

## 2018-03-20 ENCOUNTER — OFFICE VISIT (OUTPATIENT)
Dept: FAMILY MEDICINE CLINIC | Facility: CLINIC | Age: 58
End: 2018-03-20

## 2018-03-20 VITALS
RESPIRATION RATE: 17 BRPM | HEART RATE: 73 BPM | DIASTOLIC BLOOD PRESSURE: 81 MMHG | BODY MASS INDEX: 33.7 KG/M2 | SYSTOLIC BLOOD PRESSURE: 134 MMHG | HEIGHT: 64 IN | TEMPERATURE: 98 F | WEIGHT: 197.38 LBS

## 2018-03-20 DIAGNOSIS — R20.0 NUMBNESS AND TINGLING OF FOOT: Primary | ICD-10-CM

## 2018-03-20 DIAGNOSIS — R20.2 NUMBNESS AND TINGLING OF FOOT: Primary | ICD-10-CM

## 2018-03-20 LAB
ANION GAP SERPL CALC-SCNC: 9 MMOL/L (ref 0–18)
BUN SERPL-MCNC: 11 MG/DL (ref 8–20)
BUN/CREAT SERPL: 16.9 (ref 10–20)
CALCIUM SERPL-MCNC: 9.7 MG/DL (ref 8.5–10.5)
CHLORIDE SERPL-SCNC: 101 MMOL/L (ref 95–110)
CO2 SERPL-SCNC: 24 MMOL/L (ref 22–32)
CREAT SERPL-MCNC: 0.65 MG/DL (ref 0.5–1.5)
GLUCOSE SERPL-MCNC: 109 MG/DL (ref 70–99)
IRON SATN MFR SERPL: 17 % (ref 15–50)
IRON SERPL-MCNC: 67 MCG/DL (ref 28–170)
OSMOLALITY UR CALC.SUM OF ELEC: 278 MOSM/KG (ref 275–295)
POTASSIUM SERPL-SCNC: 3.4 MMOL/L (ref 3.3–5.1)
SODIUM SERPL-SCNC: 134 MMOL/L (ref 136–144)
TIBC SERPL-MCNC: 393 MCG/DL (ref 228–428)
TRANSFERRIN SERPL-MCNC: 298 MG/DL (ref 192–382)
TSH SERPL-ACNC: 1.33 UIU/ML (ref 0.45–5.33)
VIT B12 SERPL-MCNC: 253 PG/ML (ref 181–914)

## 2018-03-20 PROCEDURE — 99214 OFFICE O/P EST MOD 30 MIN: CPT | Performed by: FAMILY MEDICINE

## 2018-03-20 PROCEDURE — 99212 OFFICE O/P EST SF 10 MIN: CPT | Performed by: FAMILY MEDICINE

## 2018-03-20 NOTE — PROGRESS NOTES
HPI:    Patient ID: Jarad Tinsley is a 61year old female. Foot Pain    The pain is present in the left foot and right foot. This is a new problem. The current episode started in the past 7 days. The problem occurs constantly.  The problem has been wa heard.  Pulmonary/Chest: Effort normal and breath sounds normal.   Musculoskeletal: She exhibits no edema. Neurological: She is alert and oriented to person, place, and time. She has normal reflexes. She exhibits normal muscle tone.  Coordination normal.

## 2018-03-21 ENCOUNTER — APPOINTMENT (OUTPATIENT)
Dept: LAB | Age: 58
End: 2018-03-21
Attending: FAMILY MEDICINE
Payer: COMMERCIAL

## 2018-03-21 DIAGNOSIS — E53.8 LOW VITAMIN B12 LEVEL: ICD-10-CM

## 2018-03-21 LAB
HBA1C MFR BLD: 5.3 % (ref 4–6)
HCYS SERPL-SCNC: 9.8 UMOL/L

## 2018-03-21 PROCEDURE — 83090 ASSAY OF HOMOCYSTEINE: CPT

## 2018-03-21 PROCEDURE — 36415 COLL VENOUS BLD VENIPUNCTURE: CPT

## 2018-03-21 PROCEDURE — 83921 ORGANIC ACID SINGLE QUANT: CPT

## 2018-03-24 LAB — MMA: 0.15 UMOL/L

## 2018-03-29 ENCOUNTER — APPOINTMENT (OUTPATIENT)
Dept: LAB | Facility: HOSPITAL | Age: 58
End: 2018-03-29
Attending: Other
Payer: COMMERCIAL

## 2018-03-29 ENCOUNTER — OFFICE VISIT (OUTPATIENT)
Dept: NEUROLOGY | Facility: CLINIC | Age: 58
End: 2018-03-29

## 2018-03-29 VITALS
HEIGHT: 64 IN | BODY MASS INDEX: 33.63 KG/M2 | WEIGHT: 197 LBS | HEART RATE: 72 BPM | DIASTOLIC BLOOD PRESSURE: 84 MMHG | RESPIRATION RATE: 16 BRPM | SYSTOLIC BLOOD PRESSURE: 126 MMHG

## 2018-03-29 DIAGNOSIS — G62.9 POLYNEUROPATHY: ICD-10-CM

## 2018-03-29 DIAGNOSIS — G62.9 POLYNEUROPATHY: Primary | ICD-10-CM

## 2018-03-29 PROCEDURE — 99204 OFFICE O/P NEW MOD 45 MIN: CPT | Performed by: OTHER

## 2018-03-29 PROCEDURE — 36415 COLL VENOUS BLD VENIPUNCTURE: CPT

## 2018-03-29 PROCEDURE — 86334 IMMUNOFIX E-PHORESIS SERUM: CPT

## 2018-03-29 PROCEDURE — 87389 HIV-1 AG W/HIV-1&-2 AB AG IA: CPT

## 2018-03-29 PROCEDURE — 86038 ANTINUCLEAR ANTIBODIES: CPT

## 2018-03-29 NOTE — PROGRESS NOTES
Neurology Initial Visit     Referred By: Dr. Marie ref. provider found    Chief Complaint: Patient presents with:  Numbness: New patient RFD by Dr. Jenn Verdin. Patient states she having Buzzing in bilateral feet that last all day.  Patient states she has numbnes Father      CAD   • Diabetes Mother    • Hypertension Mother    • Heart Disorder Mother    • Asthma Mother    • Other Jill Dy Mother    • Thyroid Disorder Sister      hypothyroidism   • sarcoidosis [OTHER] Sister    • Cancer Maternal Aunt      pancreatic c General: No apparent distress, well nourished, well groomed.   Head- Normocephalic, atraumatic  Eyes- No redness or swelling  ENT- Hearing intake, smell preserved, normal glutition  Neck- No masses or adenopathy  Cv: pulses were palpable and normal, n Results  Component Value Date   HGB 13.7 06/24/2015   HCT 39.9 06/24/2015   MCV 89.5 06/24/2015   WBC 4.9 06/24/2015    06/24/2015        Lab Results  Component Value Date   BUN 11 03/20/2018   CA 9.7 03/20/2018   ALT 24 02/09/2018   AST 24 02/09/20

## 2018-04-04 ENCOUNTER — TELEPHONE (OUTPATIENT)
Dept: NEUROLOGY | Facility: CLINIC | Age: 58
End: 2018-04-04

## 2018-04-04 NOTE — TELEPHONE ENCOUNTER
----- Message from Emy Chen MD sent at 4/4/2018  8:29 AM CDT -----  Please let the patient know that results of the tests so far were normal.    Thank you

## 2018-04-11 RX ORDER — MONTELUKAST SODIUM 10 MG/1
TABLET ORAL
Qty: 30 TABLET | Refills: 0 | Status: SHIPPED | OUTPATIENT
Start: 2018-04-11 | End: 2018-05-04

## 2018-04-11 NOTE — TELEPHONE ENCOUNTER
Refill Protocol Appointment Criteria  · Appointment scheduled in the past 6 months or in the next 3 months  Recent Outpatient Visits            1 week ago Polyneuropathy    [address] [facility], [address] [place] [address] Drive, [address] [place], [place]

## 2018-04-23 ENCOUNTER — OFFICE VISIT (OUTPATIENT)
Dept: NEUROLOGY | Facility: CLINIC | Age: 58
End: 2018-04-23

## 2018-04-23 VITALS — BODY MASS INDEX: 33.12 KG/M2 | HEIGHT: 64 IN | WEIGHT: 194 LBS

## 2018-04-23 DIAGNOSIS — G62.9 POLYNEUROPATHY: Primary | ICD-10-CM

## 2018-04-23 PROCEDURE — 95886 MUSC TEST DONE W/N TEST COMP: CPT | Performed by: OTHER

## 2018-04-23 PROCEDURE — 95910 NRV CNDJ TEST 7-8 STUDIES: CPT | Performed by: OTHER

## 2018-04-23 NOTE — PROCEDURES
HISTORY:    Awilda Fischer is a 61year old female with a complaint of buzzing feeling in the feet    PHYSICAL:    Please refer to the clinic note. TECHNICAL SUMMARY:    There were no significant technical difficulty encountered during this study.   Ne Nerve / Sites Rec.  Site Onset Lat Peak Lat NP Amp PP Amp Segments Distance Velocity     ms ms µV µV  cm m/s   R SURAL - Lat Mall      Calf Lat Mall 3.23 4.06 19.3 10.0 Calf - Lat Mall 14 43   L SURAL - Lat Mall      Calf Lat Mall 3.59 4.32 7.8 8.1 Calf - L

## 2018-04-30 RX ORDER — ATORVASTATIN CALCIUM 10 MG/1
TABLET, FILM COATED ORAL
Qty: 90 TABLET | Refills: 3 | Status: SHIPPED | OUTPATIENT
Start: 2018-04-30 | End: 2019-05-06

## 2018-05-06 RX ORDER — MONTELUKAST SODIUM 10 MG/1
TABLET ORAL
Qty: 30 TABLET | Refills: 11 | Status: SHIPPED | OUTPATIENT
Start: 2018-05-06 | End: 2019-03-25

## 2018-05-10 ENCOUNTER — OFFICE VISIT (OUTPATIENT)
Dept: FAMILY MEDICINE CLINIC | Facility: CLINIC | Age: 58
End: 2018-05-10

## 2018-05-10 ENCOUNTER — NURSE TRIAGE (OUTPATIENT)
Dept: OTHER | Age: 58
End: 2018-05-10

## 2018-05-10 VITALS
HEART RATE: 75 BPM | WEIGHT: 194.63 LBS | SYSTOLIC BLOOD PRESSURE: 122 MMHG | BODY MASS INDEX: 33.23 KG/M2 | TEMPERATURE: 98 F | RESPIRATION RATE: 16 BRPM | HEIGHT: 64 IN | DIASTOLIC BLOOD PRESSURE: 80 MMHG

## 2018-05-10 DIAGNOSIS — R19.7 DIARRHEA, UNSPECIFIED TYPE: Primary | ICD-10-CM

## 2018-05-10 PROCEDURE — 99212 OFFICE O/P EST SF 10 MIN: CPT | Performed by: FAMILY MEDICINE

## 2018-05-10 PROCEDURE — 99213 OFFICE O/P EST LOW 20 MIN: CPT | Performed by: FAMILY MEDICINE

## 2018-05-10 NOTE — PROGRESS NOTES
HPI:    Patient ID: Roman Rae is a 61year old female. Diarrhea    This is a new problem. The current episode started in the past 7 days. The problem occurs 5 to 10 times per day. The problem has been unchanged.  The stool consistency is described rate, regular rhythm and normal heart sounds. No murmur heard. Pulmonary/Chest: Effort normal and breath sounds normal.   Abdominal: Soft. She exhibits no mass. There is tenderness. There is no rebound and no guarding.    Musculoskeletal: She exhibits n

## 2018-05-10 NOTE — TELEPHONE ENCOUNTER
Action Requested: Summary for Provider     []  Critical Lab, Recommendations Needed  [] Need Additional Advice  []   FYI    []   Need Orders  [] Need Medications Sent to Pharmacy  []  Other     SUMMARY: appt scheduled with Dr. Ebony Waters today, pt states she h

## 2018-06-15 RX ORDER — LISINOPRIL 40 MG/1
TABLET ORAL
Qty: 30 TABLET | Refills: 2 | Status: SHIPPED | OUTPATIENT
Start: 2018-06-15 | End: 2018-09-13

## 2018-07-24 ENCOUNTER — PATIENT MESSAGE (OUTPATIENT)
Dept: FAMILY MEDICINE CLINIC | Facility: CLINIC | Age: 58
End: 2018-07-24

## 2018-07-25 ENCOUNTER — NURSE TRIAGE (OUTPATIENT)
Dept: OTHER | Age: 58
End: 2018-07-25

## 2018-07-25 NOTE — TELEPHONE ENCOUNTER
Action Requested: Summary for Provider     []  Critical Lab, Recommendations Needed  [] Need Additional Advice  [x]   FYI    []   Need Orders  [] Need Medications Sent to Pharmacy  []  Other     SUMMARY: Called patient in regards to My Chart message.    Livia Myers

## 2018-07-25 NOTE — TELEPHONE ENCOUNTER
From: Georgie Rizzo  To: Cata Langley MD  Sent: 7/24/2018  9:59 PM CDT  Subject: Other    Dr. Peewee Black, I continue to have what looks like Petechiae on my lower legs and I’m wondering if I need to do anything for it or come in to see you.  Last time [facility]

## 2018-07-25 NOTE — TELEPHONE ENCOUNTER
From: Jhoana Ledesma  To: Catrachita Kerr MD  Sent: 7/24/2018 9:59 PM CDT  Subject: Other    Dr. Linda Rodríguez, I continue to have what looks like Petechiae on my lower legs and I’m wondering if I need to do anything for it or come in to see you.  Last time this

## 2018-07-26 ENCOUNTER — OFFICE VISIT (OUTPATIENT)
Dept: FAMILY MEDICINE CLINIC | Facility: CLINIC | Age: 58
End: 2018-07-26
Payer: COMMERCIAL

## 2018-07-26 VITALS
WEIGHT: 199 LBS | TEMPERATURE: 98 F | DIASTOLIC BLOOD PRESSURE: 76 MMHG | HEART RATE: 80 BPM | BODY MASS INDEX: 34 KG/M2 | SYSTOLIC BLOOD PRESSURE: 135 MMHG | RESPIRATION RATE: 16 BRPM

## 2018-07-26 DIAGNOSIS — I87.2 VENOUS STASIS DERMATITIS, UNSPECIFIED LATERALITY: Primary | ICD-10-CM

## 2018-07-26 PROCEDURE — 99212 OFFICE O/P EST SF 10 MIN: CPT | Performed by: FAMILY MEDICINE

## 2018-07-26 PROCEDURE — 99213 OFFICE O/P EST LOW 20 MIN: CPT | Performed by: FAMILY MEDICINE

## 2018-07-26 NOTE — PROGRESS NOTES
HPI:    Patient ID: Cathy Kaur is a 61year old female.     HPI    Review of Systems         Current Outpatient Prescriptions:  LISINOPRIL 40 MG Oral Tab TAKE 1 TABLET(40 MG) BY MOUTH EVERY DAY Disp: 30 tablet Rfl: 2   MONTELUKAST SODIUM 10 MG Oral Ta were placed in this encounter.       Meds This Visit:  No prescriptions requested or ordered in this encounter    Imaging & Referrals:  [facility] - INTERNAL       JI#[id number]

## 2018-08-13 ENCOUNTER — PATIENT MESSAGE (OUTPATIENT)
Dept: FAMILY MEDICINE CLINIC | Facility: CLINIC | Age: 58
End: 2018-08-13

## 2018-08-13 DIAGNOSIS — H91.93 BILATERAL HEARING LOSS, UNSPECIFIED HEARING LOSS TYPE: Primary | ICD-10-CM

## 2018-08-13 NOTE — TELEPHONE ENCOUNTER
From: Joseph Jose  To: Daria Chávez MD  Sent: 8/13/2018 12:37 PM CDT  Subject: Referral Request    Can you write me a refferral for a hearing test? I can't hear and I was told a few years ago that I needed hearing aids and now I think I'm ready to

## 2018-08-14 NOTE — TELEPHONE ENCOUNTER
From: Heidy Urbina  To: Delvin Robison MD  Sent: 8/13/2018 7:45 PM CDT  Subject: Referral Request    I couldn't find the referral for hearing test. Where do I find the referral?  Eliseo Mesa

## 2018-09-14 RX ORDER — LISINOPRIL 40 MG/1
TABLET ORAL
Qty: 90 TABLET | Refills: 0 | Status: SHIPPED | OUTPATIENT
Start: 2018-09-14 | End: 2018-12-10

## 2018-09-15 NOTE — TELEPHONE ENCOUNTER
Hypertensive Medications  Protocol Criteria:  · Appointment scheduled in the past 6 months or in the next 3 months  · BMP or CMP in the past 12 months  · Creatinine result < 2  Recent Outpatient Visits            1 month ago Venous stasis dermatitis, unspe

## 2018-10-16 RX ORDER — METOPROLOL SUCCINATE 100 MG/1
TABLET, EXTENDED RELEASE ORAL
Qty: 90 TABLET | Refills: 0 | Status: SHIPPED | OUTPATIENT
Start: 2018-10-16 | End: 2019-01-03

## 2018-11-15 RX ORDER — VERAPAMIL HYDROCHLORIDE 240 MG/1
TABLET, FILM COATED, EXTENDED RELEASE ORAL
Qty: 90 TABLET | Refills: 0 | Status: SHIPPED | OUTPATIENT
Start: 2018-11-15 | End: 2019-02-01

## 2018-11-23 ENCOUNTER — OFFICE VISIT (OUTPATIENT)
Dept: FAMILY MEDICINE CLINIC | Facility: CLINIC | Age: 58
End: 2018-11-23
Payer: COMMERCIAL

## 2018-11-23 VITALS
HEART RATE: 74 BPM | RESPIRATION RATE: 20 BRPM | SYSTOLIC BLOOD PRESSURE: 140 MMHG | DIASTOLIC BLOOD PRESSURE: 90 MMHG | OXYGEN SATURATION: 98 % | TEMPERATURE: 98 F

## 2018-11-23 DIAGNOSIS — J30.89 ENVIRONMENTAL AND SEASONAL ALLERGIES: ICD-10-CM

## 2018-11-23 DIAGNOSIS — J01.01 ACUTE RECURRENT MAXILLARY SINUSITIS: Primary | ICD-10-CM

## 2018-11-23 DIAGNOSIS — J02.8 PHARYNGITIS DUE TO OTHER ORGANISM: ICD-10-CM

## 2018-11-23 PROCEDURE — 99213 OFFICE O/P EST LOW 20 MIN: CPT

## 2018-11-23 PROCEDURE — 87880 STREP A ASSAY W/OPTIC: CPT

## 2018-11-23 RX ORDER — CLINDAMYCIN HYDROCHLORIDE 300 MG/1
300 CAPSULE ORAL 3 TIMES DAILY
Qty: 30 CAPSULE | Refills: 0 | Status: SHIPPED | OUTPATIENT
Start: 2018-11-23 | End: 2018-11-23 | Stop reason: ALTCHOICE

## 2018-11-23 RX ORDER — DOXYCYCLINE HYCLATE 100 MG
100 TABLET ORAL 2 TIMES DAILY
Qty: 20 TABLET | Refills: 0 | Status: SHIPPED | OUTPATIENT
Start: 2018-11-23 | End: 2018-12-03

## 2018-11-24 NOTE — PATIENT INSTRUCTIONS
Controlling Allergens: Pets  Constant exposure to allergens means constant allergy symptoms. That’s why controlling or avoiding the allergens that cause your symptoms is an important part of your treatment.  If you are allergic to pets, the tips below may © 5948-0014 [employer] [address]. [address] [facility], [address]. All rights reserved. This information is not intended as a substitute for professional medical care. Always follow your healthcare professional's instructions.         Control Avoid yard work and pulling weeds. These and other outdoor activities increase your exposure to pollen. If that’s not possible, wear a filter mask. When you’re done, bathe, wash your hair, and change your clothes.    Date Last Reviewed: 9/1/2016  © 2000-201 · You can use an over-the-counter decongestant, unless a similar medicine was prescribed to you. Nasal sprays work the fastest. Use one that contains phenylephrine or oxymetazoline. First blow your nose gently. Then use the spray.  Do not use these medicine · Don’t have close contact with people who have sore throats, colds, or other upper respiratory infections. · Don’t smoke, and stay away from secondhand smoke. · Stay up to date with of your vaccines.   Date Last Reviewed: 11/1/2017  © 6181-4260 [employer]

## 2018-11-24 NOTE — PROGRESS NOTES
CHIEF COMPLAINT:   Patient presents with:  Sore Throat: 10 days    HPI:   Joel Jasso is a 60year old female with hx of cold symptoms that have progressed to frontal and maxillary sinus congestion and pressure.  Reports fatigue from interrupted sleep • CARPAL TUNNEL RELEASE  2009    [LATERALITY NOT STATED]   • COLONOSCOPY     • COLONOSCOPY & POLYPECTOMY  2011    colonoscopy with polypectomy, repat in 5 yr; colonic polyps, tubular adenoma   • CUBITAL TUNNEL RELEASE Right 5/8/2017    Performed by Winifred Schofield EARS: TM's are dull, non-bulging and non-injected, bilaterally  NOSE: No exudates, nasal mucosa is erythematous and swollen.  Maxillary sinus pain and over bridge of nose and under media eyebrows/ethmoid   THROAT: Oral mucosa pink, moist. Posterior pharynx Many people think that pet allergy is caused by the fur of cats and dogs. But researchers have found that the major allergens are proteins made by oil glands in the animals’ skin. These proteins are shed in flakes of skin called dander.  Allergy-causing pro Even a clean home can be full of allergens, so take a moment to see what you can do to cut down on allergens in each room of your home. Try to avoid things like cigarette smoke and perfume.  They can irritate your eyes, nose, throat, and lungs and make your © 2160-4722 The [address]. [address] [facility], [facility] [place]. All rights reserved. This information is not intended as a substitute for professional medical care. Always follow your healthcare professional's instructions.         Sinusit · You can use an over-the-counter decongestant, unless a similar medicine was prescribed to you. Nasal sprays work the fastest. Use one that contains phenylephrine or oxymetazoline. First blow your nose gently. Then use the spray.  Do not use these medicine · Don’t have close contact with people who have sore throats, colds, or other upper respiratory infections. · Don’t smoke, and stay away from secondhand smoke. · Stay up to date with of your vaccines.   Date Last Reviewed: 11/1/2017  © 3742-9687 [employer]

## 2018-12-10 RX ORDER — LISINOPRIL 40 MG/1
TABLET ORAL
Qty: 90 TABLET | Refills: 0 | Status: SHIPPED | OUTPATIENT
Start: 2018-12-10 | End: 2019-02-25

## 2019-01-03 RX ORDER — METOPROLOL SUCCINATE 100 MG/1
TABLET, EXTENDED RELEASE ORAL
Qty: 90 TABLET | Refills: 0 | Status: SHIPPED | OUTPATIENT
Start: 2019-01-03 | End: 2019-04-11

## 2019-02-01 RX ORDER — VERAPAMIL HYDROCHLORIDE 240 MG/1
TABLET, FILM COATED, EXTENDED RELEASE ORAL
Qty: 90 TABLET | Refills: 0 | Status: SHIPPED | OUTPATIENT
Start: 2019-02-01 | End: 2019-05-02

## 2019-02-04 ENCOUNTER — OFFICE VISIT (OUTPATIENT)
Dept: FAMILY MEDICINE CLINIC | Facility: CLINIC | Age: 59
End: 2019-02-04
Payer: COMMERCIAL

## 2019-02-04 VITALS
BODY MASS INDEX: 34.31 KG/M2 | RESPIRATION RATE: 18 BRPM | SYSTOLIC BLOOD PRESSURE: 134 MMHG | WEIGHT: 201 LBS | DIASTOLIC BLOOD PRESSURE: 75 MMHG | HEIGHT: 64 IN | TEMPERATURE: 98 F | HEART RATE: 83 BPM

## 2019-02-04 DIAGNOSIS — Z00.00 ROUTINE PHYSICAL EXAMINATION: Primary | ICD-10-CM

## 2019-02-04 DIAGNOSIS — G60.9 IDIOPATHIC PERIPHERAL NEUROPATHY: ICD-10-CM

## 2019-02-04 DIAGNOSIS — Z12.31 ENCOUNTER FOR SCREENING MAMMOGRAM FOR BREAST CANCER: ICD-10-CM

## 2019-02-04 DIAGNOSIS — J45.20 MILD INTERMITTENT ASTHMA WITHOUT COMPLICATION: ICD-10-CM

## 2019-02-04 DIAGNOSIS — I10 ESSENTIAL HYPERTENSION: ICD-10-CM

## 2019-02-04 DIAGNOSIS — E66.9 NON MORBID OBESITY, UNSPECIFIED OBESITY TYPE: ICD-10-CM

## 2019-02-04 DIAGNOSIS — M76.892 HIP FLEXOR TENDINITIS, LEFT: ICD-10-CM

## 2019-02-04 PROBLEM — M75.101 ROTATOR CUFF SYNDROME, RIGHT: Status: RESOLVED | Noted: 2017-02-09 | Resolved: 2019-02-04

## 2019-02-04 PROCEDURE — 90686 IIV4 VACC NO PRSV 0.5 ML IM: CPT | Performed by: FAMILY MEDICINE

## 2019-02-04 PROCEDURE — 90471 IMMUNIZATION ADMIN: CPT | Performed by: FAMILY MEDICINE

## 2019-02-04 PROCEDURE — 99396 PREV VISIT EST AGE 40-64: CPT | Performed by: FAMILY MEDICINE

## 2019-02-04 RX ORDER — AMITRIPTYLINE HYDROCHLORIDE 25 MG/1
25 TABLET, FILM COATED ORAL NIGHTLY
Qty: 30 TABLET | Refills: 3 | Status: SHIPPED | OUTPATIENT
Start: 2019-02-04 | End: 2019-05-16

## 2019-02-04 NOTE — PROGRESS NOTES
HPI:    Patient ID: Nikunj Franz is a 60year old female. Hip Pain    The pain is present in the left hip. This is a new problem. The current episode started more than 1 month ago. There has been no history of extremity trauma.  The problem occurs in rate, regular rhythm and normal heart sounds. No murmur heard. Pulmonary/Chest: Effort normal and breath sounds normal.   Breasts–normal appearance, no masses   Abdominal: Soft. She exhibits no mass. There is no tenderness.    Musculoskeletal: She exhibi Metabolic Panel (14) [E]      Lipid Panel [E]      TSH [E]      CBC, Platelet, No Differential [E]      Hemoglobin A1C [E]      Vitamin B12 with Reflex to MMA [E]      Flulaval 0.5 ml 6 mon and older Quad single dose PF (38546)      Meds This Visit:  Alberto

## 2019-02-05 LAB
ALBUMIN/GLOBULIN RATIO: 2 (CALC) (ref 1–2.5)
ALBUMIN: 4.5 G/DL (ref 3.6–5.1)
ALKALINE PHOSPHATASE: 138 U/L (ref 33–130)
ALT: 41 U/L (ref 6–29)
AST: 25 U/L (ref 10–35)
BILIRUBIN, TOTAL: 0.7 MG/DL (ref 0.2–1.2)
BUN: 12 MG/DL (ref 7–25)
CALCIUM: 9.8 MG/DL (ref 8.6–10.4)
CARBON DIOXIDE: 27 MMOL/L (ref 20–32)
CHLORIDE: 101 MMOL/L (ref 98–110)
CHOL/HDLC RATIO: 3.9 (CALC)
CHOLESTEROL, TOTAL: 217 MG/DL
CREATININE: 0.6 MG/DL (ref 0.5–1.05)
EGFR IF AFRICN AM: 116 ML/MIN/1.73M2
EGFR IF NONAFRICN AM: 100 ML/MIN/1.73M2
GLOBULIN: 2.2 G/DL (CALC) (ref 1.9–3.7)
GLUCOSE: 92 MG/DL (ref 65–99)
HDL CHOLESTEROL: 56 MG/DL
HEMATOCRIT: 38.5 % (ref 35–45)
HEMOGLOBIN A1C: 5.3 % OF TOTAL HGB
HEMOGLOBIN: 13.2 G/DL (ref 11.7–15.5)
LDL-CHOLESTEROL: 128 MG/DL (CALC)
MCH: 29.9 PG (ref 27–33)
MCHC: 34.3 G/DL (ref 32–36)
MCV: 87.1 FL (ref 80–100)
MPV: 10.9 FL (ref 7.5–12.5)
NON-HDL CHOLESTEROL: 161 MG/DL (CALC)
PLATELET COUNT: 263 THOUSAND/UL (ref 140–400)
POTASSIUM: 4 MMOL/L (ref 3.5–5.3)
PROTEIN, TOTAL: 6.7 G/DL (ref 6.1–8.1)
RDW: 12.4 % (ref 11–15)
RED BLOOD CELL COUNT: 4.42 MILLION/UL (ref 3.8–5.1)
SODIUM: 137 MMOL/L (ref 135–146)
TRIGLYCERIDES: 189 MG/DL
TSH: 1.14 MIU/L (ref 0.4–4.5)
WHITE BLOOD CELL COUNT: 6.3 THOUSAND/UL (ref 3.8–10.8)

## 2019-02-26 RX ORDER — LISINOPRIL 40 MG/1
TABLET ORAL
Qty: 90 TABLET | Refills: 3 | Status: SHIPPED | OUTPATIENT
Start: 2019-02-26 | End: 2020-03-23

## 2019-03-13 ENCOUNTER — LAB ENCOUNTER (OUTPATIENT)
Dept: LAB | Facility: HOSPITAL | Age: 59
End: 2019-03-13
Attending: FAMILY MEDICINE
Payer: COMMERCIAL

## 2019-03-13 DIAGNOSIS — G60.9 IDIOPATHIC PERIPHERAL NEUROPATHY: ICD-10-CM

## 2019-03-13 LAB — VIT B12 SERPL-MCNC: 590 PG/ML (ref 193–986)

## 2019-03-13 PROCEDURE — 82607 VITAMIN B-12: CPT

## 2019-03-13 PROCEDURE — 36415 COLL VENOUS BLD VENIPUNCTURE: CPT

## 2019-03-25 RX ORDER — MONTELUKAST SODIUM 10 MG/1
TABLET ORAL
Qty: 30 TABLET | Refills: 11 | Status: SHIPPED | OUTPATIENT
Start: 2019-03-25 | End: 2020-06-03

## 2019-03-27 ENCOUNTER — HOSPITAL ENCOUNTER (OUTPATIENT)
Dept: MAMMOGRAPHY | Facility: HOSPITAL | Age: 59
Discharge: HOME | End: 2019-03-27
Attending: FAMILY MEDICINE
Payer: COMMERCIAL

## 2019-03-27 DIAGNOSIS — Z12.31 ENCOUNTER FOR SCREENING MAMMOGRAM FOR BREAST CANCER: ICD-10-CM

## 2019-03-27 PROCEDURE — 77067 SCR MAMMO BI INCL CAD: CPT | Performed by: FAMILY MEDICINE

## 2019-03-27 PROCEDURE — 77063 BREAST TOMOSYNTHESIS BI: CPT | Performed by: FAMILY MEDICINE

## 2019-04-05 ENCOUNTER — HOSPITAL ENCOUNTER (OUTPATIENT)
Dept: MAMMOGRAPHY | Facility: HOSPITAL | Age: 59
Discharge: HOME | End: 2019-04-05
Attending: FAMILY MEDICINE
Payer: COMMERCIAL

## 2019-04-05 ENCOUNTER — HOSPITAL ENCOUNTER (OUTPATIENT)
Dept: ULTRASOUND IMAGING | Facility: HOSPITAL | Age: 59
Discharge: HOME | End: 2019-04-05
Attending: FAMILY MEDICINE
Payer: COMMERCIAL

## 2019-04-05 DIAGNOSIS — R92.8 ABNORMAL MAMMOGRAM: ICD-10-CM

## 2019-04-05 PROCEDURE — 76642 ULTRASOUND BREAST LIMITED: CPT | Performed by: FAMILY MEDICINE

## 2019-04-05 PROCEDURE — 77065 DX MAMMO INCL CAD UNI: CPT | Performed by: FAMILY MEDICINE

## 2019-04-05 PROCEDURE — 77061 BREAST TOMOSYNTHESIS UNI: CPT | Performed by: FAMILY MEDICINE

## 2019-04-11 RX ORDER — METOPROLOL SUCCINATE 100 MG/1
TABLET, EXTENDED RELEASE ORAL
Qty: 90 TABLET | Refills: 3 | Status: SHIPPED | OUTPATIENT
Start: 2019-04-11 | End: 2020-04-21

## 2019-05-02 RX ORDER — VERAPAMIL HYDROCHLORIDE 240 MG/1
TABLET, FILM COATED, EXTENDED RELEASE ORAL
Qty: 90 TABLET | Refills: 1 | Status: SHIPPED | OUTPATIENT
Start: 2019-05-02 | End: 2019-10-28

## 2019-05-02 NOTE — TELEPHONE ENCOUNTER
Refill passed per [facility] Anneliese protocol.   Hypertensive Medications  Protocol Criteria:  · Appointment scheduled in the past 6 months or in the next 3 months  · BMP or CMP in the past 12 months  · Creatinine result < 2  Recent Outpatient Visits

## 2019-05-03 RX ORDER — ATORVASTATIN CALCIUM 10 MG/1
TABLET, FILM COATED ORAL
Qty: 90 TABLET | Refills: 0 | OUTPATIENT
Start: 2019-05-03

## 2019-05-06 RX ORDER — ATORVASTATIN CALCIUM 10 MG/1
TABLET, FILM COATED ORAL
Qty: 90 TABLET | Refills: 0 | OUTPATIENT
Start: 2019-05-06

## 2019-05-06 RX ORDER — ATORVASTATIN CALCIUM 10 MG/1
10 TABLET, FILM COATED ORAL
Qty: 90 TABLET | Refills: 1 | Status: SHIPPED | OUTPATIENT
Start: 2019-05-06 | End: 2019-11-03

## 2019-05-06 NOTE — TELEPHONE ENCOUNTER
Refill passed per [facility] protocol.     Cholesterol Medications  Protocol Criteria:  · Appointment scheduled in the past 12 months or in the next 3 months  · ALT & LDL on file in the past 12 months  · ALT result < 80  · LDL result <130   Recent Outp

## 2019-05-13 ENCOUNTER — PATIENT MESSAGE (OUTPATIENT)
Dept: FAMILY MEDICINE CLINIC | Facility: CLINIC | Age: 59
End: 2019-05-13

## 2019-05-13 DIAGNOSIS — M79.644 PAIN OF RIGHT THUMB: Primary | ICD-10-CM

## 2019-05-13 NOTE — TELEPHONE ENCOUNTER
From: Brian Spencer  To: Dot Ayala MD  Sent: 5/13/2019 9:06 AM CDT  Subject: Referral Request    Dr. Jose Luis Jung,  Can I please get a referral for Dr. Stephanie De La Garza? My right hand thumb is swollen and the joint is painful.  I also think I have trigger

## 2019-05-16 ENCOUNTER — HOSPITAL ENCOUNTER (OUTPATIENT)
Dept: GENERAL RADIOLOGY | Facility: HOSPITAL | Age: 59
Discharge: HOME | End: 2019-05-16
Attending: ORTHOPAEDIC SURGERY
Payer: COMMERCIAL

## 2019-05-16 ENCOUNTER — OFFICE VISIT (OUTPATIENT)
Dept: ORTHOPEDICS CLINIC | Facility: CLINIC | Age: 59
End: 2019-05-16
Payer: COMMERCIAL

## 2019-05-16 DIAGNOSIS — M18.11 ARTHRITIS OF CARPOMETACARPAL (CMC) JOINT OF RIGHT THUMB: ICD-10-CM

## 2019-05-16 DIAGNOSIS — M25.531 RIGHT WRIST PAIN: ICD-10-CM

## 2019-05-16 DIAGNOSIS — M25.531 RIGHT WRIST PAIN: Primary | ICD-10-CM

## 2019-05-16 PROCEDURE — 99212 OFFICE O/P EST SF 10 MIN: CPT | Performed by: ORTHOPAEDIC SURGERY

## 2019-05-16 PROCEDURE — 99213 OFFICE O/P EST LOW 20 MIN: CPT | Performed by: ORTHOPAEDIC SURGERY

## 2019-05-16 PROCEDURE — L3923 HFO WITHOUT JOINTS PRE CST: HCPCS | Performed by: ORTHOPAEDIC SURGERY

## 2019-05-16 PROCEDURE — 73110 X-RAY EXAM OF WRIST: CPT | Performed by: ORTHOPAEDIC SURGERY

## 2019-05-16 NOTE — PROGRESS NOTES
5/16/2019  Amilcar Marcus  86/1960  60year old   female  Elodia Perry MD    HPI:   Patient presents with:  Finger Pain: C/o right thumb pain for a few years that will wake her up at night.   Difficult to hold cards, cups, etc      The patient is ri • Primary osteoarthritis of first carpometacarpal joint of right hand 5/4/2016   • Rotator cuff syndrome, right 2/9/2017   • Sx.7/18/16, CPT.20853, R Mid Trigger Finger Release, w/, Global Exp.10/16/16 5/4/2016   • Trigger index finger of [facility] and oriented x 3 and overall well appearing. The patient has normal mood. The patient is non-tender and atraumatic with the exception of their right upper extremity. The patient's skin is intact and compartments are soft.   The patient's upper extremitie of their questions were answered and they are in agreement with the treatment plan. The patient will return to clinic in 6 weeks as needed    No orders of the defined types were placed in this encounter.

## 2019-06-04 ENCOUNTER — OFFICE VISIT (OUTPATIENT)
Dept: FAMILY MEDICINE CLINIC | Facility: CLINIC | Age: 59
End: 2019-06-04
Payer: COMMERCIAL

## 2019-06-04 VITALS
HEART RATE: 108 BPM | TEMPERATURE: 99 F | DIASTOLIC BLOOD PRESSURE: 78 MMHG | SYSTOLIC BLOOD PRESSURE: 134 MMHG | RESPIRATION RATE: 20 BRPM | WEIGHT: 207.38 LBS | BODY MASS INDEX: 36 KG/M2 | OXYGEN SATURATION: 98 %

## 2019-06-04 DIAGNOSIS — J02.9 PHARYNGITIS, UNSPECIFIED ETIOLOGY: Primary | ICD-10-CM

## 2019-06-04 PROCEDURE — 87880 STREP A ASSAY W/OPTIC: CPT | Performed by: FAMILY MEDICINE

## 2019-06-04 PROCEDURE — 99212 OFFICE O/P EST SF 10 MIN: CPT | Performed by: FAMILY MEDICINE

## 2019-06-04 PROCEDURE — 99213 OFFICE O/P EST LOW 20 MIN: CPT | Performed by: FAMILY MEDICINE

## 2019-06-04 NOTE — PROGRESS NOTES
HPI:    Patient ID: Shaka Drew is a 60year old female. Sore Throat    This is a new problem. The current episode started in the past 7 days. The problem has been gradually worsening.  Neither side of throat is experiencing more pain than the other No rhinorrhea. Right sinus exhibits no maxillary sinus tenderness and no frontal sinus tenderness. Left sinus exhibits no maxillary sinus tenderness and no frontal sinus tenderness. Mouth/Throat: Mucous membranes are not dry. No uvula swelling.  Posterior

## 2019-06-07 ENCOUNTER — OFFICE VISIT (OUTPATIENT)
Dept: FAMILY MEDICINE CLINIC | Facility: CLINIC | Age: 59
End: 2019-06-07
Payer: COMMERCIAL

## 2019-06-07 VITALS
OXYGEN SATURATION: 98 % | HEART RATE: 88 BPM | RESPIRATION RATE: 22 BRPM | SYSTOLIC BLOOD PRESSURE: 116 MMHG | TEMPERATURE: 99 F | DIASTOLIC BLOOD PRESSURE: 73 MMHG

## 2019-06-07 DIAGNOSIS — H65.92 OME (OTITIS MEDIA WITH EFFUSION), LEFT: Primary | ICD-10-CM

## 2019-06-07 DIAGNOSIS — J45.20 MILD INTERMITTENT ASTHMA WITHOUT COMPLICATION: ICD-10-CM

## 2019-06-07 PROCEDURE — 99213 OFFICE O/P EST LOW 20 MIN: CPT

## 2019-06-07 RX ORDER — DOXYCYCLINE HYCLATE 100 MG
100 TABLET ORAL 2 TIMES DAILY
Qty: 20 TABLET | Refills: 0 | Status: SHIPPED | OUTPATIENT
Start: 2019-06-07 | End: 2019-06-17

## 2019-06-08 NOTE — PROGRESS NOTES
Alejandra Blanco is a 60year old female. CHIEF COMPLAINT:   Patient presents with:  Ear Pain: 2 days  Cough/URI: 1 week      HPI:   The patient complains of  7 day history of URI but has had progressive Left ear pain in last 1-2 days.  with reduced hearin Smoking status: Never Smoker      Smokeless tobacco: Never Used    Alcohol use: Yes      Comment: moderate intake, occasionally    Drug use: No       REVIEW OF SYSTEMS:   GENERAL: reports fatigue; appetite -decreased  SKIN: no unusual skin lesions or r Add once daily plain Claritin for a few weeks and use your Albuterol QID for next 2 days, then prn cough. Continue Flonase  Follow up with PCP if no progress in 4 days.     Patient Instructions     Common Middle Ear Problems    Your middle ear may have been Use the once daily antihistamine, such as Claritin, Zyrtec or Allegra, in addition to nasal steroid (Flonase/Fluticasone) and Albuterol- for the cough. Follow up with your personal doctor in 4 days if not improving.   Tylenol/Motrin as needed for pain    F

## 2019-06-13 ENCOUNTER — OFFICE VISIT (OUTPATIENT)
Dept: FAMILY MEDICINE CLINIC | Facility: CLINIC | Age: 59
End: 2019-06-13
Payer: COMMERCIAL

## 2019-06-13 VITALS
RESPIRATION RATE: 18 BRPM | TEMPERATURE: 98 F | DIASTOLIC BLOOD PRESSURE: 82 MMHG | HEART RATE: 82 BPM | SYSTOLIC BLOOD PRESSURE: 120 MMHG

## 2019-06-13 DIAGNOSIS — H65.92 LEFT OTITIS MEDIA WITH EFFUSION: Primary | ICD-10-CM

## 2019-06-13 PROCEDURE — 99212 OFFICE O/P EST SF 10 MIN: CPT | Performed by: FAMILY MEDICINE

## 2019-06-13 PROCEDURE — 99213 OFFICE O/P EST LOW 20 MIN: CPT | Performed by: FAMILY MEDICINE

## 2019-06-13 RX ORDER — FLUTICASONE PROPIONATE 50 MCG
2 SPRAY, SUSPENSION (ML) NASAL DAILY
Qty: 1 BOTTLE | Refills: 5 | Status: SHIPPED | OUTPATIENT
Start: 2019-06-13 | End: 2020-06-12

## 2019-06-13 RX ORDER — PSEUDOEPHEDRINE HCL 120 MG/1
120 TABLET, FILM COATED, EXTENDED RELEASE ORAL EVERY 12 HOURS
Qty: 28 TABLET | Refills: 1 | Status: SHIPPED | OUTPATIENT
Start: 2019-06-13 | End: 2019-11-09

## 2019-06-13 RX ORDER — AZITHROMYCIN 250 MG/1
TABLET, FILM COATED ORAL
Qty: 6 TABLET | Refills: 0 | Status: SHIPPED | OUTPATIENT
Start: 2019-06-13 | End: 2019-06-18 | Stop reason: ALTCHOICE

## 2019-06-13 NOTE — PROGRESS NOTES
HPI:    Patient ID: Roman Rae is a 60year old female. Ear Pain      For the past 10 days patient has had left ear pain. Initially accompanied by fever, sore throat, congestion.   Seen at immediate care 1 week ago treated for left otitis media wi EXAM:   Physical Exam  Left TM below. Right TM normal.  Pharynx clear. No cervical adenopathy. No rash.          ASSESSMENT/PLAN:   Left otitis media with effusion  (primary encounter diagnosis)     Patient diagnosed with left otitis media at immediate c

## 2019-06-17 ENCOUNTER — PATIENT MESSAGE (OUTPATIENT)
Dept: FAMILY MEDICINE CLINIC | Facility: CLINIC | Age: 59
End: 2019-06-17

## 2019-06-17 ENCOUNTER — TELEPHONE (OUTPATIENT)
Dept: OTHER | Age: 59
End: 2019-06-17

## 2019-06-17 DIAGNOSIS — H65.492 CHRONIC OTITIS MEDIA OF LEFT EAR WITH EFFUSION: Primary | ICD-10-CM

## 2019-06-17 NOTE — TELEPHONE ENCOUNTER
Spoke with the patient and informed her of Dr. Norm Singh orders and plan of care. Patient was provided with the phone number to ENT. Patient voiced understanding and agreed with the plan of care.  Per chart review the patient has an appointment pending for

## 2019-06-17 NOTE — TELEPHONE ENCOUNTER
From: Joel Jasso  To: Mazin Ayoub MD  Sent: 6/17/2019 8:05 AM CDT  Subject: Visit Follow-up Question    Dr. Theresa Smith, I hoping I can get a referral for ENT visit because my left ear hasn't changed at all even after all the medications.  It is diffic

## 2019-06-17 NOTE — TELEPHONE ENCOUNTER
Pt has had 2 OVs in the past few weeks for URI and left ear effusion. She has just finished her second round of antibiotics but her left ear problems have not improved. She reports only going dizziness, pain and the sensation that she is \"underwater. \"

## 2019-06-18 ENCOUNTER — OFFICE VISIT (OUTPATIENT)
Dept: OTOLARYNGOLOGY | Facility: CLINIC | Age: 59
End: 2019-06-18
Payer: COMMERCIAL

## 2019-06-18 VITALS
TEMPERATURE: 98 F | BODY MASS INDEX: 33.29 KG/M2 | HEIGHT: 64 IN | SYSTOLIC BLOOD PRESSURE: 143 MMHG | DIASTOLIC BLOOD PRESSURE: 77 MMHG | WEIGHT: 195 LBS

## 2019-06-18 DIAGNOSIS — H65.22 LEFT CHRONIC SEROUS OTITIS MEDIA: Primary | ICD-10-CM

## 2019-06-18 PROCEDURE — 99212 OFFICE O/P EST SF 10 MIN: CPT | Performed by: OTOLARYNGOLOGY

## 2019-06-18 PROCEDURE — 99243 OFF/OP CNSLTJ NEW/EST LOW 30: CPT | Performed by: OTOLARYNGOLOGY

## 2019-06-18 RX ORDER — METHYLPREDNISOLONE 4 MG/1
TABLET ORAL
Qty: 1 PACKAGE | Refills: 0 | Status: SHIPPED | OUTPATIENT
Start: 2019-06-18 | End: 2019-09-18

## 2019-06-18 RX ORDER — LORATADINE 10 MG/1
10 TABLET ORAL DAILY
Qty: 30 TABLET | Refills: 3 | Status: SHIPPED | OUTPATIENT
Start: 2019-06-18 | End: 2019-09-18

## 2019-06-18 RX ORDER — MONTELUKAST SODIUM 10 MG/1
10 TABLET ORAL NIGHTLY
Qty: 30 TABLET | Refills: 3 | Status: SHIPPED | OUTPATIENT
Start: 2019-06-18 | End: 2019-09-18

## 2019-06-18 NOTE — PROGRESS NOTES
Beatrice Guidry is a 60year old female.   Patient presents with:  Ear Pain: left ear since June 6, 2019, pt was on 2 courses of oral antbiotics, currently using flonase and a decongestant       HISTORY OF PRESENT ILLNESS    She presents with a history of • Thyroid Disorder Sister         hypothyroidism   • Other (sarcoidosis) Sister    • Cancer Maternal Aunt         pancreatic cancer   • Psychiatric Son    • Stroke Maternal Grandmother         cerebrovascular accident   • Other (CVA) Maternal Grandmother Tremors. Psych Negative Anxiety and depression. Integumentary Negative Frequent skin infections, pigment change and rash. Hema/Lymph Negative Easy bleeding and easy bruising.            PHYSICAL EXAM    /77   Temp 98.4 °F (36.9 °C) (Oral)   Ht 5 HOUR) 120 MG Oral Tablet 12 Hr, Take 1 tablet (120 mg total) by mouth every 12 (twelve) hours. , Disp: 28 tablet, Rfl: 1  •  Fluticasone Propionate 50 MCG/ACT Nasal Suspension, 2 sprays by Each Nare route daily. , Disp: 1 Bottle, Rfl: 5  •  atorvastatin 10 M

## 2019-07-22 ENCOUNTER — PATIENT MESSAGE (OUTPATIENT)
Dept: FAMILY MEDICINE CLINIC | Facility: CLINIC | Age: 59
End: 2019-07-22

## 2019-07-22 DIAGNOSIS — M79.644 PAIN OF RIGHT THUMB: Primary | ICD-10-CM

## 2019-07-22 NOTE — TELEPHONE ENCOUNTER
From: Cortez Yates  To: Puma Conklin MD  Sent: 7/22/2019 2:48 PM CDT  Subject: Referral Request    Dr. Sally Whittaker,  Can I have a referral for Dr. Sandy Espinosa.  I saw her in May and haven't had any thumb pain relief after wearing the new thumb brace and taki

## 2019-07-30 ENCOUNTER — TELEPHONE (OUTPATIENT)
Dept: GASTROENTEROLOGY | Facility: CLINIC | Age: 59
End: 2019-07-30

## 2019-07-30 NOTE — TELEPHONE ENCOUNTER
----- Message from Jovita Mondragon RN sent at 9/23/2016  5:37 PM CDT -----  Regarding: CLN recall  3 year CLN recall, per Dr. Miranda Crews; CLN done 8/30/16

## 2019-08-02 ENCOUNTER — PATIENT MESSAGE (OUTPATIENT)
Dept: FAMILY MEDICINE CLINIC | Facility: CLINIC | Age: 59
End: 2019-08-02

## 2019-08-02 DIAGNOSIS — Z12.11 ENCOUNTER FOR SCREENING COLONOSCOPY: Primary | ICD-10-CM

## 2019-08-02 DIAGNOSIS — Z86.010 HISTORY OF COLON POLYPS: ICD-10-CM

## 2019-08-02 NOTE — TELEPHONE ENCOUNTER
From: Bhakti Kimble  To: Charlie Corley MD  Sent: 8/2/2019 8:10 AM CDT  Subject: Referral Request    Dr. Jenn Verdin,  It's time for my colonoscopy! Can you please send a referral to Dr. Rand Tony? Thank you!   Aida Neri  10/6/1960  69

## 2019-08-13 ENCOUNTER — OFFICE VISIT (OUTPATIENT)
Dept: ORTHOPEDICS CLINIC | Facility: CLINIC | Age: 59
End: 2019-08-13
Payer: COMMERCIAL

## 2019-08-13 DIAGNOSIS — M18.11 ARTHRITIS OF CARPOMETACARPAL (CMC) JOINT OF RIGHT THUMB: ICD-10-CM

## 2019-08-13 DIAGNOSIS — M25.531 RIGHT WRIST PAIN: Primary | ICD-10-CM

## 2019-08-13 PROCEDURE — 99214 OFFICE O/P EST MOD 30 MIN: CPT | Performed by: ORTHOPAEDIC SURGERY

## 2019-08-13 NOTE — PROGRESS NOTES
8/13/2019  Maverick Lowe  86/1960  60year old   female  Jody Stanley MD    HPI:   Patient presents with:  Wrist Pain: Right f/u - states that nothing helped - still has pain rated as 3-4/10 at all the time.       The patient complains of pain loca -6 hours for 2 weeks. Disp: 1 Inhaler Rfl: 0   Vitamin D3 (VITAMIN D3) 1000 UNITS Oral Tab Take 1,000 Units by mouth daily.  Disp:  Rfl:       HISTORY:  Past Medical History:   Diagnosis Date   • Cubital tunnel syndrome, right/SX DATE 5-8-17, CPT [id number], W/L Tobacco Use      Smoking status: Never Smoker      Smokeless tobacco: Never Used    Alcohol use: Yes      Comment: moderate intake, occasionally    Drug use: No            EXAM:   There were no vitals taken for this visit.   The patient is awake and oriente excision and tendon transfer. Risks include bleeding, infection, neurovascular injury, failure of the procedure, stiffness, and potential need for additional surgery as well as anesthetic complications including death.   The patient consented to the proced

## 2019-08-19 ENCOUNTER — NURSE ONLY (OUTPATIENT)
Dept: GASTROENTEROLOGY | Facility: CLINIC | Age: 59
End: 2019-08-19

## 2019-08-19 DIAGNOSIS — Z86.010 HISTORY OF COLON POLYPS: Primary | ICD-10-CM

## 2019-08-19 NOTE — PROGRESS NOTES
Forwarded to Dr Shilpa Garcia for orders. This is a 3 yr recall. Meds/allergies reviewed. See your 8/30/16 previous colon.      Last Procedure:  8/30/16 colon for hx high risk adenomas from 2014 and 2015 colons--previous tattooing  Last Diagnosis:  Found 2 minute

## 2019-08-21 NOTE — PROGRESS NOTES
Thanks all, Ms Martha Robbins has a high risk polyp history and is due for a follow-up exam.    Schedule colonoscopy exam at [facility] (Asaf Myers)    This patient IS NOT appropriate for the [facility] endoscopy center.     BMI 34.2

## 2019-08-21 NOTE — PROGRESS NOTES
Scheduled for:  Colonoscopy 38944  Provider Name: Dr. Gomez James  Date:  11/5/19  Location:  [facility]  Sedation:  MAC  Time:   7764 (pt is aware to arrive at 1230)   Prep:  Golytely, sent via Mychart  Meds/Allergies Reconciled?:  Physician reviewed   Diagnosis with

## 2019-09-17 ENCOUNTER — TELEPHONE (OUTPATIENT)
Dept: ORTHOPEDICS CLINIC | Facility: CLINIC | Age: 59
End: 2019-09-17

## 2019-09-17 NOTE — TELEPHONE ENCOUNTER
Overton Castleman requesting to speak with RN in regards to PA for pt sx on 9/20. States she knows [facility] but was informed that [facility] still does PA for Jami Taylor until 10/1.  pls advise thank you

## 2019-09-17 NOTE — TELEPHONE ENCOUNTER
S/w Dalia November in our [address] and she checked codes and no auth needed.  Tried to call Leonie Larry back several times, but got busy signal. Ed Sheets

## 2019-09-20 ENCOUNTER — ANESTHESIA (OUTPATIENT)
Dept: SURGERY | Facility: HOSPITAL | Age: 59
End: 2019-09-20
Payer: COMMERCIAL

## 2019-09-20 ENCOUNTER — HOSPITAL ENCOUNTER (OUTPATIENT)
Facility: HOSPITAL | Age: 59
Setting detail: HOSPITAL OUTPATIENT SURGERY
Discharge: HOME | End: 2019-09-20
Attending: ORTHOPAEDIC SURGERY | Admitting: ORTHOPAEDIC SURGERY
Payer: COMMERCIAL

## 2019-09-20 ENCOUNTER — ANESTHESIA EVENT (OUTPATIENT)
Dept: SURGERY | Facility: HOSPITAL | Age: 59
End: 2019-09-20
Payer: COMMERCIAL

## 2019-09-20 VITALS
RESPIRATION RATE: 18 BRPM | WEIGHT: 198.81 LBS | HEART RATE: 70 BPM | HEIGHT: 64 IN | BODY MASS INDEX: 33.94 KG/M2 | SYSTOLIC BLOOD PRESSURE: 130 MMHG | TEMPERATURE: 98 F | DIASTOLIC BLOOD PRESSURE: 62 MMHG | OXYGEN SATURATION: 97 %

## 2019-09-20 DIAGNOSIS — M18.11 ARTHRITIS OF CARPOMETACARPAL (CMC) JOINT OF RIGHT THUMB: ICD-10-CM

## 2019-09-20 DIAGNOSIS — M25.531 RIGHT WRIST PAIN: ICD-10-CM

## 2019-09-20 PROCEDURE — 64415 NJX AA&/STRD BRCH PLXS IMG: CPT | Performed by: ORTHOPAEDIC SURGERY

## 2019-09-20 PROCEDURE — 0RUS07Z SUPPLEMENT RIGHT CARPOMETACARPAL JOINT WITH AUTOLOGOUS TISSUE SUBSTITUTE, OPEN APPROACH: ICD-10-PCS | Performed by: ORTHOPAEDIC SURGERY

## 2019-09-20 PROCEDURE — 0LX70ZZ TRANSFER RIGHT HAND TENDON, OPEN APPROACH: ICD-10-PCS | Performed by: ORTHOPAEDIC SURGERY

## 2019-09-20 PROCEDURE — 76942 ECHO GUIDE FOR BIOPSY: CPT | Performed by: ORTHOPAEDIC SURGERY

## 2019-09-20 PROCEDURE — 3E0T3BZ INTRODUCTION OF ANESTHETIC AGENT INTO PERIPHERAL NERVES AND PLEXI, PERCUTANEOUS APPROACH: ICD-10-PCS | Performed by: ANESTHESIOLOGY

## 2019-09-20 PROCEDURE — 94010 BREATHING CAPACITY TEST: CPT | Performed by: ORTHOPAEDIC SURGERY

## 2019-09-20 PROCEDURE — 99152 MOD SED SAME PHYS/QHP 5/>YRS: CPT | Performed by: ORTHOPAEDIC SURGERY

## 2019-09-20 RX ORDER — CLINDAMYCIN PHOSPHATE 150 MG/ML
INJECTION, SOLUTION INTRAVENOUS AS NEEDED
Status: DISCONTINUED | OUTPATIENT
Start: 2019-09-20 | End: 2019-09-20 | Stop reason: SURG

## 2019-09-20 RX ORDER — MIDAZOLAM HYDROCHLORIDE 1 MG/ML
INJECTION INTRAMUSCULAR; INTRAVENOUS
Status: COMPLETED | OUTPATIENT
Start: 2019-09-20 | End: 2019-09-20

## 2019-09-20 RX ORDER — MORPHINE SULFATE 4 MG/ML
4 INJECTION, SOLUTION INTRAMUSCULAR; INTRAVENOUS EVERY 10 MIN PRN
Status: DISCONTINUED | OUTPATIENT
Start: 2019-09-20 | End: 2019-09-20

## 2019-09-20 RX ORDER — HYDROCODONE BITARTRATE AND ACETAMINOPHEN 5; 325 MG/1; MG/1
1-2 TABLET ORAL EVERY 6 HOURS PRN
Qty: 20 TABLET | Refills: 0 | Status: SHIPPED | OUTPATIENT
Start: 2019-09-20 | End: 2019-09-30

## 2019-09-20 RX ORDER — HYDROMORPHONE HYDROCHLORIDE 1 MG/ML
0.6 INJECTION, SOLUTION INTRAMUSCULAR; INTRAVENOUS; SUBCUTANEOUS EVERY 5 MIN PRN
Status: DISCONTINUED | OUTPATIENT
Start: 2019-09-20 | End: 2019-09-20

## 2019-09-20 RX ORDER — MORPHINE SULFATE 10 MG/ML
6 INJECTION, SOLUTION INTRAMUSCULAR; INTRAVENOUS EVERY 10 MIN PRN
Status: DISCONTINUED | OUTPATIENT
Start: 2019-09-20 | End: 2019-09-20

## 2019-09-20 RX ORDER — HYDROMORPHONE HYDROCHLORIDE 1 MG/ML
0.4 INJECTION, SOLUTION INTRAMUSCULAR; INTRAVENOUS; SUBCUTANEOUS EVERY 5 MIN PRN
Status: DISCONTINUED | OUTPATIENT
Start: 2019-09-20 | End: 2019-09-20

## 2019-09-20 RX ORDER — HALOPERIDOL 5 MG/ML
0.25 INJECTION INTRAMUSCULAR ONCE AS NEEDED
Status: DISCONTINUED | OUTPATIENT
Start: 2019-09-20 | End: 2019-09-20

## 2019-09-20 RX ORDER — ONDANSETRON 2 MG/ML
4 INJECTION INTRAMUSCULAR; INTRAVENOUS EVERY 6 HOURS PRN
Status: DISCONTINUED | OUTPATIENT
Start: 2019-09-20 | End: 2019-09-20

## 2019-09-20 RX ORDER — HYDROCODONE BITARTRATE AND ACETAMINOPHEN 5; 325 MG/1; MG/1
1 TABLET ORAL AS NEEDED
Status: DISCONTINUED | OUTPATIENT
Start: 2019-09-20 | End: 2019-09-20

## 2019-09-20 RX ORDER — METOCLOPRAMIDE 10 MG/1
10 TABLET ORAL ONCE
Status: COMPLETED | OUTPATIENT
Start: 2019-09-20 | End: 2019-09-20

## 2019-09-20 RX ORDER — ACETAMINOPHEN 500 MG
1000 TABLET ORAL ONCE
Status: COMPLETED | OUTPATIENT
Start: 2019-09-20 | End: 2019-09-20

## 2019-09-20 RX ORDER — ROPIVACAINE HYDROCHLORIDE 5 MG/ML
INJECTION, SOLUTION EPIDURAL; INFILTRATION; PERINEURAL
Status: COMPLETED | OUTPATIENT
Start: 2019-09-20 | End: 2019-09-20

## 2019-09-20 RX ORDER — LIDOCAINE HYDROCHLORIDE 10 MG/ML
INJECTION, SOLUTION EPIDURAL; INFILTRATION; INTRACAUDAL; PERINEURAL AS NEEDED
Status: DISCONTINUED | OUTPATIENT
Start: 2019-09-20 | End: 2019-09-20 | Stop reason: SURG

## 2019-09-20 RX ORDER — PROCHLORPERAZINE EDISYLATE 5 MG/ML
5 INJECTION INTRAMUSCULAR; INTRAVENOUS ONCE AS NEEDED
Status: DISCONTINUED | OUTPATIENT
Start: 2019-09-20 | End: 2019-09-20

## 2019-09-20 RX ORDER — FAMOTIDINE 20 MG/1
20 TABLET ORAL ONCE
Status: COMPLETED | OUTPATIENT
Start: 2019-09-20 | End: 2019-09-20

## 2019-09-20 RX ORDER — HYDROCODONE BITARTRATE AND ACETAMINOPHEN 5; 325 MG/1; MG/1
2 TABLET ORAL AS NEEDED
Status: DISCONTINUED | OUTPATIENT
Start: 2019-09-20 | End: 2019-09-20

## 2019-09-20 RX ORDER — DEXAMETHASONE SODIUM PHOSPHATE 10 MG/ML
INJECTION, SOLUTION INTRAMUSCULAR; INTRAVENOUS
Status: COMPLETED | OUTPATIENT
Start: 2019-09-20 | End: 2019-09-20

## 2019-09-20 RX ORDER — ONDANSETRON 2 MG/ML
4 INJECTION INTRAMUSCULAR; INTRAVENOUS ONCE AS NEEDED
Status: DISCONTINUED | OUTPATIENT
Start: 2019-09-20 | End: 2019-09-20

## 2019-09-20 RX ORDER — ONDANSETRON 2 MG/ML
INJECTION INTRAMUSCULAR; INTRAVENOUS AS NEEDED
Status: DISCONTINUED | OUTPATIENT
Start: 2019-09-20 | End: 2019-09-20 | Stop reason: SURG

## 2019-09-20 RX ORDER — LIDOCAINE HYDROCHLORIDE 10 MG/ML
INJECTION, SOLUTION INFILTRATION; PERINEURAL
Status: COMPLETED | OUTPATIENT
Start: 2019-09-20 | End: 2019-09-20

## 2019-09-20 RX ORDER — CLINDAMYCIN PHOSPHATE 900 MG/50ML
900 INJECTION INTRAVENOUS ONCE
Status: DISCONTINUED | OUTPATIENT
Start: 2019-09-20 | End: 2019-09-20 | Stop reason: HOSPADM

## 2019-09-20 RX ORDER — SODIUM CHLORIDE, SODIUM LACTATE, POTASSIUM CHLORIDE, CALCIUM CHLORIDE 600; 310; 30; 20 MG/100ML; MG/100ML; MG/100ML; MG/100ML
INJECTION, SOLUTION INTRAVENOUS CONTINUOUS
Status: DISCONTINUED | OUTPATIENT
Start: 2019-09-20 | End: 2019-09-20

## 2019-09-20 RX ORDER — HYDROMORPHONE HYDROCHLORIDE 1 MG/ML
0.2 INJECTION, SOLUTION INTRAMUSCULAR; INTRAVENOUS; SUBCUTANEOUS EVERY 5 MIN PRN
Status: DISCONTINUED | OUTPATIENT
Start: 2019-09-20 | End: 2019-09-20

## 2019-09-20 RX ORDER — NALOXONE HYDROCHLORIDE 0.4 MG/ML
80 INJECTION, SOLUTION INTRAMUSCULAR; INTRAVENOUS; SUBCUTANEOUS AS NEEDED
Status: DISCONTINUED | OUTPATIENT
Start: 2019-09-20 | End: 2019-09-20

## 2019-09-20 RX ORDER — MORPHINE SULFATE 4 MG/ML
2 INJECTION, SOLUTION INTRAMUSCULAR; INTRAVENOUS EVERY 10 MIN PRN
Status: DISCONTINUED | OUTPATIENT
Start: 2019-09-20 | End: 2019-09-20

## 2019-09-20 RX ORDER — METOPROLOL TARTRATE 5 MG/5ML
2.5 INJECTION INTRAVENOUS ONCE
Status: DISCONTINUED | OUTPATIENT
Start: 2019-09-20 | End: 2019-09-20

## 2019-09-20 RX ADMIN — ROPIVACAINE HYDROCHLORIDE 30 ML: 5 INJECTION, SOLUTION EPIDURAL; INFILTRATION; PERINEURAL at 12:44:00

## 2019-09-20 RX ADMIN — MIDAZOLAM HYDROCHLORIDE 2 MG: 1 INJECTION INTRAMUSCULAR; INTRAVENOUS at 12:44:00

## 2019-09-20 RX ADMIN — LIDOCAINE HYDROCHLORIDE 50 MG: 10 INJECTION, SOLUTION EPIDURAL; INFILTRATION; INTRACAUDAL; PERINEURAL at 13:45:00

## 2019-09-20 RX ADMIN — ONDANSETRON 4 MG: 2 INJECTION INTRAMUSCULAR; INTRAVENOUS at 13:45:00

## 2019-09-20 RX ADMIN — LIDOCAINE HYDROCHLORIDE 1 ML: 10 INJECTION, SOLUTION INFILTRATION; PERINEURAL at 12:44:00

## 2019-09-20 RX ADMIN — SODIUM CHLORIDE, SODIUM LACTATE, POTASSIUM CHLORIDE, CALCIUM CHLORIDE: 600; 310; 30; 20 INJECTION, SOLUTION INTRAVENOUS at 14:50:00

## 2019-09-20 RX ADMIN — CLINDAMYCIN PHOSPHATE 900 MG: 150 INJECTION, SOLUTION INTRAVENOUS at 13:53:00

## 2019-09-20 RX ADMIN — DEXAMETHASONE SODIUM PHOSPHATE 4 MG: 10 INJECTION, SOLUTION INTRAMUSCULAR; INTRAVENOUS at 12:44:00

## 2019-09-20 NOTE — ANESTHESIA PROCEDURE NOTES
Peripheral Block  Date/Time: 9/20/2019 12:44 PM    Anesthesiologist:  Migdalia Fontenot MD  Performed by:   Anesthesiologist  Patient Location:  PACU  Start Time:  9/20/2019 12:44 PM  End Time:  9/20/2019 12:50 PM  Site Identification: ultrasound guided, real

## 2019-09-20 NOTE — ANESTHESIA PROCEDURE NOTES
Airway  Date/Time: 9/20/2019 1:54 PM  Urgency: elective    Airway not difficult    General Information and Staff    Patient location during procedure: OR  Anesthesiologist: Viki Ornelas MD  Resident/CRNA: Kendra Miner CRNA  Performed: CRNA     Indicat

## 2019-09-20 NOTE — BRIEF OP NOTE
Pre-Operative Diagnosis: Right wrist pain [M25.531]  Arthritis of carpometacarpal Karnes) joint of right thumb [M18.11]     Post-Operative Diagnosis: Right wrist pain [M25.531]Arthritis of carpometacarpal (CMC) joint of right thumb [M18.11]      Procedure Pe

## 2019-09-20 NOTE — H&P
Jose [place]  86/1960  60year old   female  Eliu James MD     HPI:   Patient presents with:  Wrist Pain: Right f/u - states that nothing helped - still has pain rated as 3-4/10 at all the time.        The patient complains of pain located righ hours for 2 weeks. Disp: 1 Inhaler Rfl: 0   Vitamin D3 (VITAMIN D3) 1000 UNITS Oral Tab Take 1,000 Units by mouth daily.  Disp:  Rfl:        HISTORY:       Past Medical History:   Diagnosis Date   • Cubital tunnel syndrome, right/SX DATE 5-8-17, CPT 45120, sudden cardiac death, family h/o       Social History    Tobacco Use      Smoking status: Never Smoker      Smokeless tobacco: Never Used    Alcohol use: Yes      Comment: moderate intake, occasionally    Drug use:  No               EXAM:   There were no vi recommended was right basilar thumb arthroplasty with trapezial excision and tendon transfer.   Risks include bleeding, infection, neurovascular injury, failure of the procedure, stiffness, and potential need for additional surgery as well as anesthetic com

## 2019-09-20 NOTE — ANESTHESIA PREPROCEDURE EVALUATION
Anesthesia PreOp Note    HPI:     Leatha Amado is a 60year old female who presents for preoperative consultation requested by: Clotilde Landry MD    Date of Surgery: 9/20/2019    Procedure(s):  HAND RESECTION ARTHROPLASTY OF CARPAL / Enrigue Muskrat Trigger Finger Release, w/, Global Exp.10/16/16 5/4/2016   • Trigger index finger of right hand 6/22/2016       Past Surgical History:   Procedure Laterality Date   • BACK SURGERY     • CARPAL TUNNEL RELEASE  2009    [LATERALITY NOT STATED]   • 9/19/2019 at 0800   PEG 3350-KCl-NaBcb-NaCl-NaSulf (GOLYTELY) 236 g Oral Recon Soln Take as split dose.  Generic/substitute okay Disp: 4000 mL Rfl: 0        Current Facility-Administered Medications Ordered in Epic:  lactated ringers infusion  Intravenous C file      Transportation needs:        Medical: Not on file        Non-medical: Not on file    Tobacco Use      Smoking status: Never Smoker      Smokeless tobacco: Never Used    Substance and Sexual Activity      Alcohol use: Yes        Comment: moderate mass index is 34.12 kg/m². height is 1.626 m (5' 4\") and weight is 90.2 kg (198 lb 12.8 oz). Her oral temperature is 98 °F (36.7 °C). Her blood pressure is 144/63 and her pulse is 82.  Her respiration is 14 and oxygen saturation is 97%.    09/18/19  1147

## 2019-09-20 NOTE — ANESTHESIA POSTPROCEDURE EVALUATION
Patient: Heidy Urbina    Procedure Summary     Date:  09/20/19 Room / Location:  [address] [place] [address] MAIN OR 09 / [address] [facility] MAIN OR    Anesthesia Start:  2556 Anesthesia Stop:  2811    Procedure:  HAND RESECTION ARTHROPLASTY OF CARPAL / METACARPAL (Right Hand) Diagnosis:

## 2019-09-21 NOTE — OPERATIVE REPORT
[facility]    PATIENT'S NAME: Rosaline Jung   ATTENDING PHYSICIAN: Danny Quintero MD   OPERATING PHYSICIAN: Danny Quintero MD   PATIENT ACCOUNT#:   [id number]    LOCATION:  [address]  MEDICAL RECORD #:   [id number] time.  Her left upper extremity was prepped and draped in a sterile fashion. Tourniquet was inflated to 250 mmHg following Esmarch exsanguination.   A longitudinal incision was made from her right thumb metacarpal base to the level of the radial styloid wi discharged to home in stable condition with a narcotic pain prescription, written instructions, and 24-hour access emergency numbers. She will return to clinic in 10 to 14 days for repeat clinical and radiographic evaluation.   All of her questions were an

## 2019-10-03 PROBLEM — Z47.89 AFTERCARE FOLLOWING SURGERY OF THE MUSCULOSKELETAL SYSTEM: Status: ACTIVE | Noted: 2019-10-03

## 2019-10-28 RX ORDER — VERAPAMIL HYDROCHLORIDE 240 MG/1
TABLET, FILM COATED, EXTENDED RELEASE ORAL
Qty: 90 TABLET | Refills: 1 | Status: SHIPPED | OUTPATIENT
Start: 2019-10-28 | End: 2020-05-27

## 2019-11-04 RX ORDER — ATORVASTATIN CALCIUM 10 MG/1
TABLET, FILM COATED ORAL
Qty: 90 TABLET | Refills: 1 | Status: SHIPPED | OUTPATIENT
Start: 2019-11-04 | End: 2020-04-21

## 2019-11-05 ENCOUNTER — ANESTHESIA (OUTPATIENT)
Dept: ENDOSCOPY | Facility: HOSPITAL | Age: 59
End: 2019-11-05
Payer: COMMERCIAL

## 2019-11-05 ENCOUNTER — HOSPITAL ENCOUNTER (OUTPATIENT)
Facility: HOSPITAL | Age: 59
Setting detail: HOSPITAL OUTPATIENT SURGERY
Discharge: HOME | End: 2019-11-05
Attending: INTERNAL MEDICINE | Admitting: INTERNAL MEDICINE
Payer: COMMERCIAL

## 2019-11-05 ENCOUNTER — ANESTHESIA EVENT (OUTPATIENT)
Dept: ENDOSCOPY | Facility: HOSPITAL | Age: 59
End: 2019-11-05
Payer: COMMERCIAL

## 2019-11-05 DIAGNOSIS — Z86.010 HISTORY OF COLON POLYPS: ICD-10-CM

## 2019-11-05 PROCEDURE — 3E0H8GC INTRODUCTION OF OTHER THERAPEUTIC SUBSTANCE INTO LOWER GI, VIA NATURAL OR ARTIFICIAL OPENING ENDOSCOPIC: ICD-10-PCS | Performed by: INTERNAL MEDICINE

## 2019-11-05 PROCEDURE — 0DBN8ZX EXCISION OF SIGMOID COLON, VIA NATURAL OR ARTIFICIAL OPENING ENDOSCOPIC, DIAGNOSTIC: ICD-10-PCS | Performed by: INTERNAL MEDICINE

## 2019-11-05 PROCEDURE — 45385 COLONOSCOPY W/LESION REMOVAL: CPT | Performed by: INTERNAL MEDICINE

## 2019-11-05 PROCEDURE — 0DBK8ZX EXCISION OF ASCENDING COLON, VIA NATURAL OR ARTIFICIAL OPENING ENDOSCOPIC, DIAGNOSTIC: ICD-10-PCS | Performed by: INTERNAL MEDICINE

## 2019-11-05 PROCEDURE — 0DBP8ZX EXCISION OF RECTUM, VIA NATURAL OR ARTIFICIAL OPENING ENDOSCOPIC, DIAGNOSTIC: ICD-10-PCS | Performed by: INTERNAL MEDICINE

## 2019-11-05 RX ORDER — SODIUM CHLORIDE, SODIUM LACTATE, POTASSIUM CHLORIDE, CALCIUM CHLORIDE 600; 310; 30; 20 MG/100ML; MG/100ML; MG/100ML; MG/100ML
INJECTION, SOLUTION INTRAVENOUS CONTINUOUS
Status: DISCONTINUED | OUTPATIENT
Start: 2019-11-05 | End: 2019-11-05

## 2019-11-05 RX ORDER — LIDOCAINE HYDROCHLORIDE 10 MG/ML
INJECTION, SOLUTION EPIDURAL; INFILTRATION; INTRACAUDAL; PERINEURAL AS NEEDED
Status: DISCONTINUED | OUTPATIENT
Start: 2019-11-05 | End: 2019-11-05 | Stop reason: SURG

## 2019-11-05 RX ADMIN — LIDOCAINE HYDROCHLORIDE 50 MG: 10 INJECTION, SOLUTION EPIDURAL; INFILTRATION; INTRACAUDAL; PERINEURAL at 14:17:00

## 2019-11-05 RX ADMIN — SODIUM CHLORIDE, SODIUM LACTATE, POTASSIUM CHLORIDE, CALCIUM CHLORIDE: 600; 310; 30; 20 INJECTION, SOLUTION INTRAVENOUS at 15:02:00

## 2019-11-05 NOTE — ANESTHESIA POSTPROCEDURE EVALUATION
Patient: Beatrice Guidry    Procedure Summary     Date:  11/05/19 Room / Location:  [facility] ENDOSCOPY 04 / [facility] ENDOSCOPY    Anesthesia Start:  7923 Anesthesia Stop:  4168    Procedure:  COLONOSCOPY (N/A ) Diagnosis:       History of colon polyps      (polyps,

## 2019-11-05 NOTE — ANESTHESIA PREPROCEDURE EVALUATION
Anesthesia PreOp Note    HPI:     Carole Zamora is a 60year old female who presents for preoperative consultation requested by: Naman Crews MD    Date of Surgery: 11/5/2019    Procedure(s):  COLONOSCOPY  Indication: History of colon polyp finger of right hand 6/22/2016       Past Surgical History:   Procedure Laterality Date   • BACK SURGERY     • CARPAL TUNNEL RELEASE  2009    [LATERALITY NOT STATED]   • COLONOSCOPY     • COLONOSCOPY & POLYPECTOMY  2011    colonoscopy with polypectomy, rep needed.  ), Disp: 1 Bottle, Rfl: 5, Taking      No current Epic-ordered facility-administered medications on file. No current Epic-ordered outpatient medications on file.         Bronopol                SWELLING  Darvocet [Propoxyph*    RASH, SWELLING  Pe intake, occasionally      Drug use: No      Sexual activity: Not on file    Lifestyle      Physical activity:        Days per week: Not on file        Minutes per session: Not on file      Stress: Not on file    Relationships      Social connections: distance: >3 FB  Neck ROM: full  Dental - normal exam     Pulmonary - normal exam   (+) asthma,   Cardiovascular - normal exam  (+) hypertension,     Neuro/Psych    (+)  neuromuscular disease, headaches,       GI/Hepatic/Renal      Endo/Other    (+) arthri

## 2019-11-05 NOTE — H&P
History & Physical Examination    Patient Name: Payton Man  MRN: [id number]  [facility]: [id number]  YOB: 1960    Diagnosis: History of high risk adenomatous colon polyps    Present Illness:     19-year-old woman with history hypertension, dys Diagnosis Date   • Cubital tunnel syndrome, right/SX DATE 5-8-17, CPT 53851, W/LABRIOLA, GLOBAL EXP 8-6-17 5/4/2016   • Degenerative TFCC tear, right 5/4/2016   • Endometriosis    • Hearing impairment     BILATERAL HEARING AIDS   • High blood pressure Paternal Grandfather    • Breast Cancer Paternal Cousin Female 59   • Arrhythmia Neg         sudden cardiac death, family h/o     Social History    Tobacco Use      Smoking status: Never Smoker      Smokeless tobacco: Never Used    Alcohol use: Yes      Fr

## 2019-11-05 NOTE — OPERATIVE REPORT
COLONOSCOPY PROCEDURE REPORT     DATE OF PROCEDURE:  11/5/2019     PCP: Dianna Rod. Herson Dcikens MD     PREOPERATIVE DIAGNOSIS:  History of high risk adenomatous colon polyps     POSTOPERATIVE DIAGNOSIS:  See impression. SURGEON:  Bernardo Alcocer M.D. chemoprophylaxis for colon polyp and colon cancer prevention. I encouraged Ms. Neri to continue her statin medication. Consider aspirin 81 mg daily.

## 2019-11-06 VITALS
RESPIRATION RATE: 20 BRPM | BODY MASS INDEX: 33.29 KG/M2 | DIASTOLIC BLOOD PRESSURE: 83 MMHG | OXYGEN SATURATION: 99 % | HEIGHT: 64 IN | WEIGHT: 195 LBS | HEART RATE: 72 BPM | TEMPERATURE: 97 F | SYSTOLIC BLOOD PRESSURE: 140 MMHG

## 2019-11-09 ENCOUNTER — OFFICE VISIT (OUTPATIENT)
Dept: FAMILY MEDICINE CLINIC | Facility: CLINIC | Age: 59
End: 2019-11-09
Payer: COMMERCIAL

## 2019-11-09 VITALS
BODY MASS INDEX: 34.31 KG/M2 | RESPIRATION RATE: 17 BRPM | DIASTOLIC BLOOD PRESSURE: 83 MMHG | TEMPERATURE: 99 F | HEIGHT: 64 IN | SYSTOLIC BLOOD PRESSURE: 146 MMHG | HEART RATE: 89 BPM | WEIGHT: 201 LBS

## 2019-11-09 DIAGNOSIS — I10 ESSENTIAL HYPERTENSION: ICD-10-CM

## 2019-11-09 DIAGNOSIS — M26.629 TMJ SYNDROME: Primary | ICD-10-CM

## 2019-11-09 PROCEDURE — 99213 OFFICE O/P EST LOW 20 MIN: CPT | Performed by: FAMILY MEDICINE

## 2019-11-09 RX ORDER — HYDROCHLOROTHIAZIDE 12.5 MG/1
12.5 TABLET ORAL DAILY
Qty: 90 TABLET | Refills: 3 | Status: SHIPPED | OUTPATIENT
Start: 2019-11-09 | End: 2020-07-26

## 2019-11-09 RX ORDER — DIAZEPAM 5 MG/1
5 TABLET ORAL NIGHTLY PRN
Qty: 15 TABLET | Refills: 0 | Status: SHIPPED | OUTPATIENT
Start: 2019-11-09 | End: 2020-07-23

## 2019-11-09 NOTE — PATIENT INSTRUCTIONS
TMJ Syndrome  The temporomandibular joint (TMJ) is the joint that connects your lower jaw to your head. You can feel it in front of your ears when you open and close your mouth. TMJ disorders involve chronic or recurrent pain in the joint.  When treated, · You may take acetaminophen or ibuprofen for pain, unless you were given a different pain medicine.  (Note: If you have chronic liver or kidney disease or have ever had a stomach ulcer or gastrointestinal bleeding, talk with your healthcare provider before Follow up with your healthcare provider, or as advised. Further testing and additional treatment may be required. If changes to your lifestyle do not improve your symptoms, talk with your healthcare provider about other available therapies.  These include b Make it a habit to assess your body a few times each day. Try writing yourself a reminder. Or set an alarm on your watch or computer. When doing a self-check, ask yourself:  · Do I feel stressed? · Are my muscles tense?   · Am I grinding or clenching my te · Talk with your healthcare provider before starting an exercise program.  · Always warm up and stretch before each activity. This helps prevent injury. · Try walking or swimming. These activities are easy on your joints.  They also benefit your heart and

## 2019-11-09 NOTE — PROGRESS NOTES
HPI:    Patient ID: Alejandra Blanco is a 60year old female. Ear Pain    There is pain in the left ear. This is a new problem. The current episode started in the past 7 days. The problem has been waxing and waning. There has been no fever.  Pertinent ne well-developed and well-nourished. HENT:   Mouth/Throat: Oropharynx is clear and moist.   Tenderness left TMJ   Eyes: Pupils are equal, round, and reactive to light. Neck: Neck supple. No JVD present.    Cardiovascular: Normal rate, regular rhythm and n

## 2019-11-11 ENCOUNTER — TELEPHONE (OUTPATIENT)
Dept: GASTROENTEROLOGY | Facility: CLINIC | Age: 59
End: 2019-11-11

## 2019-11-11 NOTE — TELEPHONE ENCOUNTER
GI staff, please 1. Enter recall for colonoscopy in 3 years with Dr. Nikolai White. I sent the patient's results via [address]

## 2019-11-11 NOTE — TELEPHONE ENCOUNTER
Entered into Epic:Recall colon in 3 years per /PB. Last Colon done 11/5/2019, next due 11/5/22. HM updated. Letter mailed.

## 2019-11-12 ENCOUNTER — TELEPHONE (OUTPATIENT)
Dept: GASTROENTEROLOGY | Facility: CLINIC | Age: 59
End: 2019-11-12

## 2019-11-12 NOTE — TELEPHONE ENCOUNTER
Recall colon in 3 years per.  Colon done 11/05/19    Recall entered per protocol  Message sent to the GI staff pool  Snapshot updated

## 2019-11-12 NOTE — TELEPHONE ENCOUNTER
----- Message from Eb Singh [place] sent at 11/11/2019  2:42 PM CST -----  GI staff, please 1. Enter recall for colonoscopy in 3 years with Dr. Chas Harris. I sent the patient's results via [address]

## 2019-11-18 ENCOUNTER — OFFICE VISIT (OUTPATIENT)
Dept: FAMILY MEDICINE CLINIC | Facility: CLINIC | Age: 59
End: 2019-11-18
Payer: COMMERCIAL

## 2019-11-18 VITALS
RESPIRATION RATE: 16 BRPM | SYSTOLIC BLOOD PRESSURE: 136 MMHG | TEMPERATURE: 99 F | WEIGHT: 200 LBS | BODY MASS INDEX: 34 KG/M2 | DIASTOLIC BLOOD PRESSURE: 72 MMHG | HEART RATE: 84 BPM | OXYGEN SATURATION: 98 %

## 2019-11-18 DIAGNOSIS — R09.89 RHONCHI AT BOTH LUNG BASES: ICD-10-CM

## 2019-11-18 DIAGNOSIS — J20.8 ACUTE BACTERIAL BRONCHITIS: Primary | ICD-10-CM

## 2019-11-18 DIAGNOSIS — R06.2 WHEEZING: ICD-10-CM

## 2019-11-18 DIAGNOSIS — B96.89 ACUTE BACTERIAL BRONCHITIS: Primary | ICD-10-CM

## 2019-11-18 PROCEDURE — 94640 AIRWAY INHALATION TREATMENT: CPT | Performed by: PHYSICIAN ASSISTANT

## 2019-11-18 PROCEDURE — 99213 OFFICE O/P EST LOW 20 MIN: CPT | Performed by: PHYSICIAN ASSISTANT

## 2019-11-18 RX ORDER — PREDNISONE 20 MG/1
40 TABLET ORAL DAILY
Qty: 10 TABLET | Refills: 0 | Status: SHIPPED | OUTPATIENT
Start: 2019-11-18 | End: 2019-11-23

## 2019-11-18 RX ORDER — AZITHROMYCIN 250 MG/1
TABLET, FILM COATED ORAL
Qty: 6 TABLET | Refills: 0 | Status: SHIPPED | OUTPATIENT
Start: 2019-11-18 | End: 2020-07-23 | Stop reason: ALTCHOICE

## 2019-11-18 RX ORDER — ALBUTEROL SULFATE 90 UG/1
2 AEROSOL, METERED RESPIRATORY (INHALATION) EVERY 4 HOURS PRN
Qty: 1 INHALER | Refills: 0 | Status: SHIPPED | OUTPATIENT
Start: 2019-11-18 | End: 2022-01-18

## 2019-11-18 RX ORDER — ALBUTEROL SULFATE 2.5 MG/3ML
2.5 SOLUTION RESPIRATORY (INHALATION) ONCE
Status: COMPLETED | OUTPATIENT
Start: 2019-11-18 | End: 2019-11-18

## 2019-11-18 RX ADMIN — ALBUTEROL SULFATE 2.5 MG: 2.5 SOLUTION RESPIRATORY (INHALATION) at 09:22:00

## 2019-11-18 NOTE — PROGRESS NOTES
CHIEF COMPLAINT:     Patient presents with:  Cough: 4 days, wheezing, chest congestion      HPI:   Marysol Beltran is a 60year old female who presents for cough for  4 days.  Cough started gradually and is described as mildly productive and getting wors History:   Diagnosis Date   • Cubital tunnel syndrome, right/SX DATE 5-8-17, CPT 07167, W/LABRIOLA, GLOBAL EXP 8-6-17 5/4/2016   • Degenerative TFCC tear, right 5/4/2016   • Endometriosis    • Hearing impairment     BILATERAL HEARING AIDS   • High blood pr Grandfather         bone cancer, pancreatic cancer   • Ovarian Cancer Paternal Grandmother    • Colon Cancer Paternal Grandfather    • Breast Cancer Paternal Cousin Female 59   • Arrhythmia Neg         sudden cardiac death, family h/o      Social History wheezing, chest congestion). Symptoms are consistent with:    Wheezing  Acute bacterial bronchitis  (primary encounter diagnosis)  Rhonchi at both lung bases    PLAN   Maintain fluid intake for continued hydration.  With slight rales and chest congestion pe

## 2019-11-29 ENCOUNTER — OFFICE VISIT (OUTPATIENT)
Dept: FAMILY MEDICINE CLINIC | Facility: CLINIC | Age: 59
End: 2019-11-29
Payer: COMMERCIAL

## 2019-11-29 ENCOUNTER — HOSPITAL ENCOUNTER (OUTPATIENT)
Age: 59
Discharge: HOME | End: 2019-11-29
Attending: EMERGENCY MEDICINE
Payer: COMMERCIAL

## 2019-11-29 ENCOUNTER — APPOINTMENT (OUTPATIENT)
Dept: GENERAL RADIOLOGY | Age: 59
End: 2019-11-29
Attending: EMERGENCY MEDICINE
Payer: COMMERCIAL

## 2019-11-29 VITALS
WEIGHT: 202 LBS | HEIGHT: 64 IN | DIASTOLIC BLOOD PRESSURE: 84 MMHG | RESPIRATION RATE: 18 BRPM | SYSTOLIC BLOOD PRESSURE: 143 MMHG | HEART RATE: 88 BPM | TEMPERATURE: 99 F | BODY MASS INDEX: 34.49 KG/M2 | OXYGEN SATURATION: 100 %

## 2019-11-29 DIAGNOSIS — Z02.9 ENCOUNTERS FOR UNSPECIFIED ADMINISTRATIVE PURPOSE: Primary | ICD-10-CM

## 2019-11-29 DIAGNOSIS — J06.9 UPPER RESPIRATORY TRACT INFECTION, UNSPECIFIED TYPE: Primary | ICD-10-CM

## 2019-11-29 PROCEDURE — 93010 ELECTROCARDIOGRAM REPORT: CPT

## 2019-11-29 PROCEDURE — 99214 OFFICE O/P EST MOD 30 MIN: CPT

## 2019-11-29 PROCEDURE — 71046 X-RAY EXAM CHEST 2 VIEWS: CPT | Performed by: EMERGENCY MEDICINE

## 2019-11-29 PROCEDURE — 93005 ELECTROCARDIOGRAM TRACING: CPT

## 2019-11-29 PROCEDURE — 99204 OFFICE O/P NEW MOD 45 MIN: CPT

## 2019-11-29 PROCEDURE — 94640 AIRWAY INHALATION TREATMENT: CPT

## 2019-11-29 PROCEDURE — 93010 ELECTROCARDIOGRAM REPORT: CPT | Performed by: EMERGENCY MEDICINE

## 2019-11-29 RX ORDER — PREDNISONE 20 MG/1
60 TABLET ORAL DAILY
Qty: 15 TABLET | Refills: 0 | Status: SHIPPED | OUTPATIENT
Start: 2019-11-29 | End: 2019-12-04

## 2019-11-29 RX ORDER — DEXAMETHASONE 4 MG/1
4 TABLET ORAL ONCE
Status: COMPLETED | OUTPATIENT
Start: 2019-11-29 | End: 2019-11-29

## 2019-11-29 RX ORDER — IPRATROPIUM BROMIDE AND ALBUTEROL SULFATE 2.5; .5 MG/3ML; MG/3ML
3 SOLUTION RESPIRATORY (INHALATION) ONCE
Status: COMPLETED | OUTPATIENT
Start: 2019-11-29 | End: 2019-11-29

## 2019-11-29 RX ORDER — DOXYCYCLINE HYCLATE 100 MG/1
100 CAPSULE ORAL 2 TIMES DAILY
Qty: 20 CAPSULE | Refills: 0 | Status: SHIPPED | OUTPATIENT
Start: 2019-11-29 | End: 2019-12-09

## 2019-11-29 NOTE — ED PROVIDER NOTES
Patient Seen in: [address]      History   Patient presents with:  Cough/URI    Stated Complaint: WHEEZING/COUGHING    HPI    26-year-old female presents for complaint of a cough and wheezing for the past 2.5 weeks.   She by Sahil Kenney MD at [facility]   • Zaid Jones 5 / Cathy Levo Right 9/20/2019    Performed by Sahil Kenney MD at [facility] OR   • OTHER Right     MIDDLE TRIGGER FINGER RELEASE   • OTHER Right     ELBOW ULNAR tenderness. Left Ear: Tympanic membrane normal. No mastoid tenderness. Nose: Nose normal. No nasal deformity. Right Nostril: No epistaxis. Left Nostril: No epistaxis. Mouth/Throat:      Lips: Pink.       Mouth: Mucous membranes are PAC, no stemi, interpreted by ed physician. MDM    Chest x-ray unremarkable. EKG was sinus rhythm and PACs. There are no findings on exam of heart failure. Patient is given a breathing treatment and some Decadron.   She will be start [address] [facility]  [phone]    Schedule an appointment as soon as possible for a visit in 2 days  For follow up and re-evaluation        Medications Prescribed:  Current Discharge Medication List    START taking these medications    Doxycycline Hy

## 2019-11-29 NOTE — PROGRESS NOTES
Cortez Yates is a 60year old female who presents to [facility] with c/o cough, was treated 11 days ago for Bronchitis. No improvement. Accompanied by: self  After triage, higher acuity of care was recommended to Cortez Yates today.    Rationale: maritza

## 2019-11-29 NOTE — ED INITIAL ASSESSMENT (HPI)
Per pt having cough and wheezing since 11/11. Reports has taken prednisone, albuterol and antibiotics with no relief. Denies any recent fevers states cough worse.

## 2020-01-13 ENCOUNTER — PATIENT MESSAGE (OUTPATIENT)
Dept: FAMILY MEDICINE CLINIC | Facility: CLINIC | Age: 60
End: 2020-01-13

## 2020-01-14 ENCOUNTER — NURSE TRIAGE (OUTPATIENT)
Dept: FAMILY MEDICINE CLINIC | Facility: CLINIC | Age: 60
End: 2020-01-14

## 2020-01-14 DIAGNOSIS — G89.29 CHRONIC LEFT SHOULDER PAIN: Primary | ICD-10-CM

## 2020-01-14 DIAGNOSIS — M25.512 CHRONIC LEFT SHOULDER PAIN: Primary | ICD-10-CM

## 2020-01-14 NOTE — TELEPHONE ENCOUNTER
From: Kaela Brumfield  To: Nae Bains MD  Sent: 1/13/2020 9:56 AM CST  Subject: Referral Request    Dr. Hema Bains,  I had to cancel my appointment that I scheduled for this week on 1/16 because of work. I can't get the time off.  My left shoulder has bee

## 2020-01-14 NOTE — TELEPHONE ENCOUNTER
Action Requested: Summary for Provider     []  Critical Lab, Recommendations Needed  [x] Need Additional Advice  []   FYI    []   Need Orders  [] Need Medications Sent to Pharmacy  []  Other     SUMMARY: Patient requesting referral for symptoms of left eloisa

## 2020-01-14 NOTE — TELEPHONE ENCOUNTER
----- Message from Emelyn Oconnor [facility] sent at 1/13/2020  9:56 AM CST -----  Regarding: Referral Request  Contact: [phone]  Dr. Laurence Jin,  I had to cancel my appointment that I scheduled for this week on 1/16 because of work. I can't  get the time off.  My

## 2020-01-15 NOTE — TELEPHONE ENCOUNTER
Patient informed of order placed, PA process explained to patient. Patient verbalizes understanding. Phone number given to patient for managed care dept to follow up on status, copy of order sent via [address] [place] [address] [address].

## 2020-01-28 PROBLEM — M19.012 PRIMARY OSTEOARTHRITIS OF LEFT SHOULDER: Status: ACTIVE | Noted: 2020-01-28

## 2020-03-23 RX ORDER — LISINOPRIL 40 MG/1
TABLET ORAL
Qty: 90 TABLET | Refills: 3 | Status: SHIPPED | OUTPATIENT
Start: 2020-03-23 | End: 2020-08-05

## 2020-04-21 RX ORDER — METOPROLOL SUCCINATE 100 MG/1
TABLET, EXTENDED RELEASE ORAL
Qty: 90 TABLET | Refills: 3 | Status: SHIPPED | OUTPATIENT
Start: 2020-04-21 | End: 2020-07-26

## 2020-04-21 RX ORDER — ATORVASTATIN CALCIUM 10 MG/1
TABLET, FILM COATED ORAL
Qty: 90 TABLET | Refills: 1 | Status: SHIPPED | OUTPATIENT
Start: 2020-04-21 | End: 2020-06-29

## 2020-05-27 RX ORDER — VERAPAMIL HYDROCHLORIDE 240 MG/1
TABLET, FILM COATED, EXTENDED RELEASE ORAL
Qty: 90 TABLET | Refills: 1 | Status: SHIPPED | OUTPATIENT
Start: 2020-05-27 | End: 2020-08-05

## 2020-06-03 RX ORDER — MONTELUKAST SODIUM 10 MG/1
TABLET ORAL
Qty: 30 TABLET | Refills: 11 | Status: SHIPPED | OUTPATIENT
Start: 2020-06-03 | End: 2021-08-12

## 2020-06-03 NOTE — TELEPHONE ENCOUNTER
Please let patient know Rx refilled but recommend 6-month follow-up for hypertension. Phone or video okay.

## 2020-06-26 ENCOUNTER — OFFICE VISIT (OUTPATIENT)
Dept: FAMILY MEDICINE CLINIC | Facility: CLINIC | Age: 60
End: 2020-06-26
Payer: COMMERCIAL

## 2020-06-26 ENCOUNTER — NURSE ONLY (OUTPATIENT)
Dept: FAMILY MEDICINE CLINIC | Facility: CLINIC | Age: 60
End: 2020-06-26
Payer: COMMERCIAL

## 2020-06-26 VITALS
WEIGHT: 202 LBS | SYSTOLIC BLOOD PRESSURE: 170 MMHG | DIASTOLIC BLOOD PRESSURE: 85 MMHG | TEMPERATURE: 99 F | HEIGHT: 64 IN | BODY MASS INDEX: 34.49 KG/M2 | HEART RATE: 74 BPM

## 2020-06-26 DIAGNOSIS — I10 ESSENTIAL HYPERTENSION: Primary | ICD-10-CM

## 2020-06-26 DIAGNOSIS — M25.512 CHRONIC LEFT SHOULDER PAIN: ICD-10-CM

## 2020-06-26 DIAGNOSIS — G89.29 CHRONIC LEFT SHOULDER PAIN: ICD-10-CM

## 2020-06-26 PROCEDURE — 3077F SYST BP >= 140 MM HG: CPT | Performed by: FAMILY MEDICINE

## 2020-06-26 PROCEDURE — 3079F DIAST BP 80-89 MM HG: CPT | Performed by: FAMILY MEDICINE

## 2020-06-26 PROCEDURE — 99214 OFFICE O/P EST MOD 30 MIN: CPT | Performed by: FAMILY MEDICINE

## 2020-06-26 PROCEDURE — 36415 COLL VENOUS BLD VENIPUNCTURE: CPT | Performed by: FAMILY MEDICINE

## 2020-06-26 PROCEDURE — 3008F BODY MASS INDEX DOCD: CPT | Performed by: FAMILY MEDICINE

## 2020-06-26 RX ORDER — CELECOXIB 200 MG/1
200 CAPSULE ORAL DAILY
Qty: 90 CAPSULE | Refills: 3 | Status: SHIPPED | OUTPATIENT
Start: 2020-06-26 | End: 2020-07-26

## 2020-06-26 RX ORDER — SPIRONOLACTONE 50 MG/1
50 TABLET, FILM COATED ORAL DAILY
Qty: 30 TABLET | Refills: 2 | Status: SHIPPED | OUTPATIENT
Start: 2020-06-26 | End: 2020-09-21

## 2020-06-26 RX ORDER — NEOMYCIN/POLYMYXIN B/PRAMOXINE 3.5-10K-1
CREAM (GRAM) TOPICAL
COMMUNITY
Start: 2020-01-01

## 2020-06-26 RX ORDER — PROPRANOLOL/HYDROCHLOROTHIAZID 40 MG-25MG
TABLET ORAL
COMMUNITY
Start: 2019-09-01 | End: 2021-01-19

## 2020-06-26 NOTE — PATIENT INSTRUCTIONS
Check BP daily for 1 wk then start spironolactone. Use Tylenol for pain. Continue to check for 2 wks and call me with readings.   We will plan to start Celebrex if BP improved

## 2020-06-26 NOTE — PROGRESS NOTES
HPI:    Patient ID: Kaela Brumfield is a 60year old female. Blood Pressure   This is a chronic problem. The current episode started more than 1 year ago. The problem has been unchanged.  Pertinent negatives include no chest pain, fatigue, fever or head Aero Soln 2 puffs Every 4 -6 hours for 2 weeks.  1 Inhaler 0     Allergies:  Bronopol                SWELLING  Darvocet [Propoxyph*    RASH, SWELLING  Penicillins             RASH  Sulfa Antibiotics       RASH  Sulfanilamide           RASH  Other 1 capsule (200 mg total) by mouth daily.        Imaging & Referrals:  None       #[id number]

## 2020-06-29 RX ORDER — ATORVASTATIN CALCIUM 40 MG/1
40 TABLET, FILM COATED ORAL NIGHTLY
Qty: 90 TABLET | Refills: 3 | Status: SHIPPED | OUTPATIENT
Start: 2020-06-29 | End: 2021-06-09

## 2020-07-19 ENCOUNTER — PATIENT MESSAGE (OUTPATIENT)
Dept: FAMILY MEDICINE CLINIC | Facility: CLINIC | Age: 60
End: 2020-07-19

## 2020-07-20 NOTE — TELEPHONE ENCOUNTER
From: Lorna Jeffries  To: Nae Garcia MD  Sent: 7/19/2020 7:26 PM CDT  Subject: Non-Urgent Medical Question    Dr. Katrina Garcia, I'm a little concerned about my blood pressure being too low. I started that new drug Spirnolactone 50 mg on 6/29.  Within a wee

## 2020-07-23 ENCOUNTER — TELEPHONE (OUTPATIENT)
Dept: FAMILY MEDICINE CLINIC | Facility: CLINIC | Age: 60
End: 2020-07-23

## 2020-07-23 PROBLEM — G62.9 POLYNEUROPATHY: Status: ACTIVE | Noted: 2018-04-23

## 2020-07-26 ENCOUNTER — TELEPHONE (OUTPATIENT)
Dept: FAMILY MEDICINE CLINIC | Facility: CLINIC | Age: 60
End: 2020-07-26

## 2020-07-26 ENCOUNTER — PATIENT MESSAGE (OUTPATIENT)
Dept: FAMILY MEDICINE CLINIC | Facility: CLINIC | Age: 60
End: 2020-07-26

## 2020-07-27 NOTE — TELEPHONE ENCOUNTER
Dr. Danni Bynum or Dr. Torito Hernandez (On call) please advise. Triage, please follow up.       Subject Delivery           Prescription Brooke Ji 7/26/2020 1:26 PM Reply    To: SEAN Barrett      From: Heidy Urbina      Created: 7/26/2020 1:26 PM        *-*-*This

## 2020-07-27 NOTE — TELEPHONE ENCOUNTER
From: Radha Marroquinurbon  To: Nae Fernandez MD  Sent: 7/26/2020 1:26 PM CDT  Subject: Prescription Question    Dr. Isabell Fernandez, I stopped taking Hydrochlorothizade & my BP continues to be lower then I'm used to. 90/62, 99/70, 96/61 etc.. Now I have stopped taki

## 2020-08-04 ENCOUNTER — PATIENT MESSAGE (OUTPATIENT)
Dept: FAMILY MEDICINE CLINIC | Facility: CLINIC | Age: 60
End: 2020-08-04

## 2020-08-05 ENCOUNTER — PATIENT MESSAGE (OUTPATIENT)
Dept: FAMILY MEDICINE CLINIC | Facility: CLINIC | Age: 60
End: 2020-08-05

## 2020-08-05 RX ORDER — LISINOPRIL 20 MG/1
20 TABLET ORAL DAILY
Qty: 90 TABLET | Refills: 3 | Status: SHIPPED | OUTPATIENT
Start: 2020-08-05 | End: 2021-07-19

## 2020-08-05 NOTE — TELEPHONE ENCOUNTER
Dr. Sally Whittaker  Pt called back and she stated that she received your  [employer] message but she needs some clarification. Pt stated that she is not taking the HTCZ 12.5 mg she stopped this medication about 3 weeks ago. Is she supposed to be on it?  Or is she to b

## 2020-08-05 NOTE — TELEPHONE ENCOUNTER
See TE 8-5-20    Future Appointments   Date Time Provider Tracey Ybarra   8/18/2020  5:30 PM Angela Meraz  [address] [place] [address]

## 2020-08-05 NOTE — TELEPHONE ENCOUNTER
From: Shaka Drew  To: Nae Becerra MD  Sent: 8/4/2020 7:09 PM CDT  Subject: Prescription Question    I'm sorry to keep bothering you about my BP.  Here's where I'm at, I have only been taking the Spironolactone for the last 4 days & I've had headac

## 2020-08-06 NOTE — TELEPHONE ENCOUNTER
Patient calling again for clarification of what you would like her to take now to control her blood pressure. Today she took spironolactone and 20 mg lisinopril. She does have a virtual appointment for a follow up on August 18.  She is keeping a log of her

## 2020-09-04 ENCOUNTER — TELEMEDICINE (OUTPATIENT)
Dept: FAMILY MEDICINE CLINIC | Facility: CLINIC | Age: 60
End: 2020-09-04

## 2020-09-04 VITALS — SYSTOLIC BLOOD PRESSURE: 132 MMHG | DIASTOLIC BLOOD PRESSURE: 81 MMHG

## 2020-09-04 DIAGNOSIS — I10 ESSENTIAL HYPERTENSION: Primary | ICD-10-CM

## 2020-09-04 PROCEDURE — 3075F SYST BP GE 130 - 139MM HG: CPT | Performed by: FAMILY MEDICINE

## 2020-09-04 PROCEDURE — 3079F DIAST BP 80-89 MM HG: CPT | Performed by: FAMILY MEDICINE

## 2020-09-04 PROCEDURE — 99213 OFFICE O/P EST LOW 20 MIN: CPT | Performed by: FAMILY MEDICINE

## 2020-09-04 NOTE — PROGRESS NOTES
HPI:    Patient ID: Brian Spencer is a 60year old female. This visit is conducted using Telemedicine with live, interactive video and audio.     Patient has been referred to the [facility] website at [email or web address] to review the yearly Consent to -blisters   PHYSICAL EXAM:   Physical Exam   Constitutional: She is oriented to person, place, and time. No distress. Pulmonary/Chest: No respiratory distress. Neurological: She is alert and oriented to person, place, and time.    Psychiatric: She has a

## 2020-09-10 ENCOUNTER — TELEPHONE (OUTPATIENT)
Dept: FAMILY MEDICINE CLINIC | Facility: CLINIC | Age: 60
End: 2020-09-10

## 2020-09-10 DIAGNOSIS — I10 ESSENTIAL HYPERTENSION: Primary | ICD-10-CM

## 2020-09-10 NOTE — TELEPHONE ENCOUNTER
Received a call from central scheduling at [facility] who reports the patient tried to make a lab appointment for a potassium blood draw, but there are no orders in the system.      Per the visit note on 9/4/20 the patient was suppose to get a BMP drawn within

## 2020-09-10 NOTE — TELEPHONE ENCOUNTER
Left detailed message on VM stating name with our office [facility]) lab hours M-F 1-4Pm, No further action needed

## 2020-09-10 NOTE — TELEPHONE ENCOUNTER
Please let her know I apologize for not entering orders. This lab does not need to be fasting.   Should schedule at our office as preferred lab is [facility].

## 2020-09-16 ENCOUNTER — LAB ENCOUNTER (OUTPATIENT)
Dept: LAB | Age: 60
End: 2020-09-16
Attending: FAMILY MEDICINE
Payer: COMMERCIAL

## 2020-09-16 DIAGNOSIS — I10 HTN (HYPERTENSION): Primary | ICD-10-CM

## 2020-09-16 PROCEDURE — 36415 COLL VENOUS BLD VENIPUNCTURE: CPT

## 2020-09-17 LAB
BUN: 19 MG/DL (ref 7–25)
CALCIUM: 9.7 MG/DL (ref 8.6–10.4)
CARBON DIOXIDE: 25 MMOL/L (ref 20–32)
CHLORIDE: 99 MMOL/L (ref 98–110)
CREATININE: 0.7 MG/DL (ref 0.5–0.99)
EGFR IF AFRICN AM: 109 ML/MIN/1.73M2
EGFR IF NONAFRICN AM: 94 ML/MIN/1.73M2
GLUCOSE: 101 MG/DL (ref 65–99)
POTASSIUM: 4.2 MMOL/L (ref 3.5–5.3)
SODIUM: 133 MMOL/L (ref 135–146)

## 2020-09-21 RX ORDER — MONTELUKAST SODIUM 10 MG/1
TABLET ORAL
Qty: 30 TABLET | Refills: 3 | OUTPATIENT
Start: 2020-09-21

## 2020-09-21 RX ORDER — SPIRONOLACTONE 50 MG/1
50 TABLET, FILM COATED ORAL DAILY
Qty: 90 TABLET | Refills: 1 | Status: SHIPPED | OUTPATIENT
Start: 2020-09-21 | End: 2021-03-16

## 2020-09-21 NOTE — TELEPHONE ENCOUNTER
This refill request is being sent to the provider for the following reason:  [x]Patient has not had an appointment within the past 12 months but has made an appointment on: ___  []Medication is not within protocol  []Patient did not complete follow up recommendations  []Other: ___

## 2020-11-21 ENCOUNTER — TELEPHONE (OUTPATIENT)
Dept: FAMILY MEDICINE CLINIC | Facility: CLINIC | Age: 60
End: 2020-11-21

## 2020-11-21 DIAGNOSIS — N30.00 ACUTE CYSTITIS WITHOUT HEMATURIA: Primary | ICD-10-CM

## 2020-11-21 RX ORDER — CIPROFLOXACIN 500 MG/1
500 TABLET, FILM COATED ORAL 2 TIMES DAILY
Qty: 14 TABLET | Refills: 0 | Status: SHIPPED | OUTPATIENT
Start: 2020-11-21 | End: 2020-11-28

## 2020-11-21 NOTE — TELEPHONE ENCOUNTER
Regarding: RE: Non-Urgent Medical Question  Contact: [phone]  ----- Message from Dalila Ramirez RN sent at 11/21/2020 12:01 PM CST -----       ----- Message sent from Rosemary lAbert RN to [facility] at 11/21/2020 11:03 AM ---

## 2020-12-16 ENCOUNTER — TELEPHONE (OUTPATIENT)
Dept: OTHER | Age: 60
End: 2020-12-16

## 2020-12-16 ENCOUNTER — PATIENT MESSAGE (OUTPATIENT)
Dept: FAMILY MEDICINE CLINIC | Facility: CLINIC | Age: 60
End: 2020-12-16

## 2020-12-16 NOTE — TELEPHONE ENCOUNTER
From: Amilcar Marcus  To: Nae Garcias MD  Sent: 12/16/2020 11:31 AM CST  Subject: Non-Urgent Medical Question    Dr. Christiano Martinez been having sharp sciatica nerve pain that starts in butt check and shoots down leg to foot (It's the same pain like I e

## 2020-12-16 NOTE — TELEPHONE ENCOUNTER
Spoke with patient ( verified) and relayed Dr. Carter Neither message below--patient verbalizes understanding and agreement.      Doximity appt made for Friday, 2020 with Dr. Arsh Rivera, as there is no availability tomorrow without PCP approval of specific t

## 2020-12-16 NOTE — TELEPHONE ENCOUNTER
Please let her know I would be happy to do a virtual visit. We do prefer she use acetaminophen rather than ibuprofen due to hypertension, but using ibuprofen for 2 to 3 weeks is not likely to be harmful if it is more effective.

## 2020-12-16 NOTE — TELEPHONE ENCOUNTER
From: Jasmin Julio  To: Nae Hopkins MD  Sent: 12/16/2020 11:31 AM CST  Subject: Non-Urgent Medical Question    Dr. Mamadou Tijerina been having sharp sciatica nerve pain that starts in butt check and shoots down leg to foot (It's the same pain like I e

## 2020-12-16 NOTE — TELEPHONE ENCOUNTER
Please see my chart below. Pt agrees to schedule virtual visit to discuss further with Dr Hussain Valera.     Can pt be added to your schedule tomorrow 12/17/20 for virtual?    Please advise

## 2020-12-18 ENCOUNTER — TELEMEDICINE (OUTPATIENT)
Dept: FAMILY MEDICINE CLINIC | Facility: CLINIC | Age: 60
End: 2020-12-18
Payer: COMMERCIAL

## 2020-12-18 DIAGNOSIS — M54.16 LUMBAR RADICULOPATHY: Primary | ICD-10-CM

## 2020-12-18 DIAGNOSIS — M54.9 BACK PAIN WITH HISTORY OF SPINAL SURGERY: ICD-10-CM

## 2020-12-18 DIAGNOSIS — Z98.890 BACK PAIN WITH HISTORY OF SPINAL SURGERY: ICD-10-CM

## 2020-12-18 PROCEDURE — 99213 OFFICE O/P EST LOW 20 MIN: CPT | Performed by: FAMILY MEDICINE

## 2020-12-18 RX ORDER — METHYLPREDNISOLONE 4 MG/1
TABLET ORAL
Qty: 1 KIT | Refills: 0 | Status: SHIPPED | OUTPATIENT
Start: 2020-12-18 | End: 2021-01-19 | Stop reason: ALTCHOICE

## 2020-12-18 NOTE — PROGRESS NOTES
HPI:    Patient ID: Gini Mckeon is a 63year old female. This visit is conducted using Telemedicine with live, interactive video and audio. Initially video, converted to telephone due to poor connection.     Patient has been referred to the [facility] web Lumbar radiculopathy  (primary encounter diagnosis)  Back pain with history of spinal surgery-history of L4-L5 microdiscectomy in 2013. For the past 2 weeks worsening left low back pain radiating to buttock, posterior thigh and foot.   No weakness or num

## 2021-01-06 ENCOUNTER — TELEPHONE (OUTPATIENT)
Dept: PHYSICAL THERAPY | Facility: HOSPITAL | Age: 61
End: 2021-01-06

## 2021-01-12 ENCOUNTER — TELEPHONE (OUTPATIENT)
Dept: PHYSICAL THERAPY | Facility: HOSPITAL | Age: 61
End: 2021-01-12

## 2021-01-12 ENCOUNTER — APPOINTMENT (OUTPATIENT)
Dept: PHYSICAL THERAPY | Facility: HOSPITAL | Age: 61
End: 2021-01-12
Attending: FAMILY MEDICINE
Payer: COMMERCIAL

## 2021-01-15 ENCOUNTER — TELEPHONE (OUTPATIENT)
Dept: PHYSICAL THERAPY | Facility: HOSPITAL | Age: 61
End: 2021-01-15

## 2021-01-15 NOTE — TELEPHONE ENCOUNTER
Called pt due to message sent through Tereza Woodard after canceling her last appt. Informed pt that evaluation is scheduled for Jan 26th now and that we will see her then. Asked pt to call back if she has any other questions.

## 2021-01-19 ENCOUNTER — APPOINTMENT (OUTPATIENT)
Dept: PHYSICAL THERAPY | Facility: HOSPITAL | Age: 61
End: 2021-01-19
Attending: FAMILY MEDICINE
Payer: COMMERCIAL

## 2021-01-20 ENCOUNTER — APPOINTMENT (OUTPATIENT)
Dept: PHYSICAL THERAPY | Facility: HOSPITAL | Age: 61
End: 2021-01-20
Payer: COMMERCIAL

## 2021-01-22 ENCOUNTER — APPOINTMENT (OUTPATIENT)
Dept: PHYSICAL THERAPY | Facility: HOSPITAL | Age: 61
End: 2021-01-22
Payer: COMMERCIAL

## 2021-01-26 ENCOUNTER — OFFICE VISIT (OUTPATIENT)
Dept: PHYSICAL THERAPY | Facility: HOSPITAL | Age: 61
End: 2021-01-26
Attending: FAMILY MEDICINE
Payer: COMMERCIAL

## 2021-01-26 DIAGNOSIS — M54.16 LUMBAR RADICULOPATHY: ICD-10-CM

## 2021-01-26 DIAGNOSIS — Z98.890 BACK PAIN WITH HISTORY OF SPINAL SURGERY: ICD-10-CM

## 2021-01-26 DIAGNOSIS — M54.9 BACK PAIN WITH HISTORY OF SPINAL SURGERY: ICD-10-CM

## 2021-01-26 PROCEDURE — 97140 MANUAL THERAPY 1/> REGIONS: CPT

## 2021-01-26 PROCEDURE — 97162 PT EVAL MOD COMPLEX 30 MIN: CPT

## 2021-01-26 PROCEDURE — 97110 THERAPEUTIC EXERCISES: CPT

## 2021-01-26 NOTE — PROGRESS NOTES
SPINE EVALUATION:   Referring Physician: Dr. Hema Bains  Diagnosis: Lumbar radiculopathy (M54.16)  Back pain with history of spinal surgery (M54.9)     Date of Service: 1/26/2021     PATIENT Cassandra Clark is a 63year old female who presents to t basement she goes to for laundry and her daughter's home has stairs too. Pt describes pain level current 4/10, at best 2/10, at worst 6/10.    Current functional limitations include difficulty with bending, lifting, difficulty with walking, difficulty w sitting, difficulty with driving long distances, difficulty with stairs, difficulty with work tasks, difficulty with household tasks and ADL's (gurmeet at end of day). Pt and PT discussed evaluation findings, pathology, POC and HEP.   Pt voiced understanding an evaluation, modalities as needed [ice/heat], postural corrections and importance of remaining active  Patient was instructed in and issued a HEP for: TrA contraction and PPT hooklying, hip abd and add isometrics with blue band and ball, SKTC.  Manual therap Heat/ice    Education or treatment limitation: None  Rehab Potential:good    FOTO: 53/100    Patient/Family/Caregiver was advised of these findings, precautions, and treatment options and has agreed to actively participate in planning and for this course o

## 2021-01-29 ENCOUNTER — OFFICE VISIT (OUTPATIENT)
Dept: PHYSICAL THERAPY | Facility: HOSPITAL | Age: 61
End: 2021-01-29
Attending: FAMILY MEDICINE
Payer: COMMERCIAL

## 2021-01-29 ENCOUNTER — APPOINTMENT (OUTPATIENT)
Dept: PHYSICAL THERAPY | Facility: HOSPITAL | Age: 61
End: 2021-01-29
Payer: COMMERCIAL

## 2021-01-29 PROCEDURE — 97140 MANUAL THERAPY 1/> REGIONS: CPT

## 2021-01-29 PROCEDURE — 97110 THERAPEUTIC EXERCISES: CPT

## 2021-01-29 PROCEDURE — 97112 NEUROMUSCULAR REEDUCATION: CPT

## 2021-01-29 NOTE — PROGRESS NOTES
Dx: Lumbar radiculopathy (M54.16)  Back pain with history of spinal surgery (M54.9)             Insurance (Authorized # of Visits):  [employer] (12)         Authorizing Physician: Dr. Danni Bynum  Next MD visit: none scheduled  Fall Risk: standard         Precaution Gapping lower lumbar facets on the L in R sidelying - grd II  Long axis hip distraction on L - grd II       TherEx:  SKTC using towel - 3x30\" B   Hip flexor stretch supine - 2x30\" B   Bridges (1-2 in lift) with focus on TrA brace - 2x10  STS with red b

## 2021-01-30 ENCOUNTER — HOSPITAL ENCOUNTER (OUTPATIENT)
Dept: MRI IMAGING | Facility: HOSPITAL | Age: 61
Discharge: HOME | End: 2021-01-30
Attending: FAMILY MEDICINE
Payer: COMMERCIAL

## 2021-01-30 DIAGNOSIS — M54.16 LUMBAR RADICULOPATHY: ICD-10-CM

## 2021-01-30 DIAGNOSIS — M54.9 BACK PAIN WITH HISTORY OF SPINAL SURGERY: ICD-10-CM

## 2021-01-30 DIAGNOSIS — Z98.890 BACK PAIN WITH HISTORY OF SPINAL SURGERY: ICD-10-CM

## 2021-01-30 PROCEDURE — 72148 MRI LUMBAR SPINE W/O DYE: CPT | Performed by: FAMILY MEDICINE

## 2021-01-31 ENCOUNTER — TELEPHONE (OUTPATIENT)
Dept: FAMILY MEDICINE CLINIC | Facility: CLINIC | Age: 61
End: 2021-01-31

## 2021-02-01 ENCOUNTER — APPOINTMENT (OUTPATIENT)
Dept: PHYSICAL THERAPY | Facility: HOSPITAL | Age: 61
End: 2021-02-01
Attending: FAMILY MEDICINE
Payer: COMMERCIAL

## 2021-02-08 ENCOUNTER — APPOINTMENT (OUTPATIENT)
Dept: PHYSICAL THERAPY | Facility: HOSPITAL | Age: 61
End: 2021-02-08
Attending: FAMILY MEDICINE
Payer: COMMERCIAL

## 2021-02-09 ENCOUNTER — OFFICE VISIT (OUTPATIENT)
Dept: PHYSICAL THERAPY | Facility: HOSPITAL | Age: 61
End: 2021-02-09
Attending: FAMILY MEDICINE
Payer: COMMERCIAL

## 2021-02-09 PROCEDURE — 97110 THERAPEUTIC EXERCISES: CPT

## 2021-02-09 PROCEDURE — 97140 MANUAL THERAPY 1/> REGIONS: CPT

## 2021-02-09 NOTE — PROGRESS NOTES
Dx: Lumbar radiculopathy (M54.16)  Back pain with history of spinal surgery (M54.9)             Insurance (Authorized # of Visits):  [employer] (12)         Authorizing Physician: Dr. Katrina Garcia  Next MD visit: none scheduled  Fall Risk: standard         Precaution well as hip strengthening and improved lumbar and hip mobility   Date: 1/29/2021  TX#: 2/12 Date: 2/9/2021              TX#: 3/12 Date:                 TX#: 4/ Date:                 TX#: 5/ Date:    Tx#: 6/   Manual:  STM to L side lumbar spine and L hip

## 2021-02-12 ENCOUNTER — APPOINTMENT (OUTPATIENT)
Dept: PHYSICAL THERAPY | Facility: HOSPITAL | Age: 61
End: 2021-02-12
Attending: FAMILY MEDICINE
Payer: COMMERCIAL

## 2021-02-15 ENCOUNTER — APPOINTMENT (OUTPATIENT)
Dept: PHYSICAL THERAPY | Facility: HOSPITAL | Age: 61
End: 2021-02-15
Attending: FAMILY MEDICINE
Payer: COMMERCIAL

## 2021-02-15 ENCOUNTER — TELEPHONE (OUTPATIENT)
Dept: PHYSICAL THERAPY | Facility: HOSPITAL | Age: 61
End: 2021-02-15

## 2021-02-16 ENCOUNTER — APPOINTMENT (OUTPATIENT)
Dept: PHYSICAL THERAPY | Facility: HOSPITAL | Age: 61
End: 2021-02-16
Attending: FAMILY MEDICINE
Payer: COMMERCIAL

## 2021-02-18 ENCOUNTER — TELEPHONE (OUTPATIENT)
Dept: PHYSICAL THERAPY | Facility: HOSPITAL | Age: 61
End: 2021-02-18

## 2021-02-19 ENCOUNTER — APPOINTMENT (OUTPATIENT)
Dept: PHYSICAL THERAPY | Facility: HOSPITAL | Age: 61
End: 2021-02-19
Attending: FAMILY MEDICINE
Payer: COMMERCIAL

## 2021-02-24 ENCOUNTER — OFFICE VISIT (OUTPATIENT)
Dept: PAIN CLINIC | Facility: HOSPITAL | Age: 61
End: 2021-02-24
Attending: FAMILY MEDICINE
Payer: COMMERCIAL

## 2021-02-24 VITALS
HEIGHT: 64 IN | HEART RATE: 108 BPM | BODY MASS INDEX: 32.1 KG/M2 | SYSTOLIC BLOOD PRESSURE: 139 MMHG | OXYGEN SATURATION: 98 % | WEIGHT: 188 LBS | DIASTOLIC BLOOD PRESSURE: 77 MMHG

## 2021-02-24 DIAGNOSIS — Z98.890 BACK PAIN WITH HISTORY OF SPINAL SURGERY: ICD-10-CM

## 2021-02-24 DIAGNOSIS — M15.9 PRIMARY OSTEOARTHRITIS INVOLVING MULTIPLE JOINTS: ICD-10-CM

## 2021-02-24 DIAGNOSIS — M54.9 BACK PAIN WITH HISTORY OF SPINAL SURGERY: ICD-10-CM

## 2021-02-24 DIAGNOSIS — G62.9 POLYNEUROPATHY: ICD-10-CM

## 2021-02-24 DIAGNOSIS — M48.061 SPINAL STENOSIS OF LUMBAR REGION WITH RADICULOPATHY: Primary | ICD-10-CM

## 2021-02-24 DIAGNOSIS — M54.16 LUMBAR RADICULOPATHY: ICD-10-CM

## 2021-02-24 DIAGNOSIS — M48.07 NEUROFORAMINAL STENOSIS OF LUMBOSACRAL SPINE: ICD-10-CM

## 2021-02-24 DIAGNOSIS — M54.16 SPINAL STENOSIS OF LUMBAR REGION WITH RADICULOPATHY: Primary | ICD-10-CM

## 2021-02-24 DIAGNOSIS — M79.18 DIFFUSE MYOFASCIAL PAIN SYNDROME: ICD-10-CM

## 2021-02-24 PROCEDURE — 99211 OFF/OP EST MAY X REQ PHY/QHP: CPT

## 2021-02-24 NOTE — CHRONIC PAIN
[facility]  Pain Consultation     HISTORY OF PRESENT ILLNESS:  Nile Esposito is a 63year old old female referred to the pain clinic by Dr. Willian Leon for Spinal stenosis of lumbar region with radiculopathy  (primary encounter diagnosis)  Rachael Stewart Vitamins-Minerals (MULTI-VITAMIN GUMMIES) Oral Chew Tab      • MONTELUKAST SODIUM 10 MG Oral Tab TAKE 1 TABLET BY MOUTH AT BEDTIME 30 tablet 11   • Albuterol Sulfate HFA (PROAIR HFA) 108 (90 Base) MCG/ACT Inhalation Aero Soln Inhale 2 puffs into the lungs Negative  Psychiatric:  Negative  Hematologic: No active bleeding  Lymphatic: No current lymphedema  Allergic/Immunologic:  Negative  Musculoskeletal: As above  Neurological: As above    MEDICAL HISTORY:  Patient Active Problem List:     Essential hyperten (CVA) Maternal Grandmother    • Cancer Maternal Grandfather         bone cancer, pancreatic cancer   • Ovarian Cancer Paternal Grandmother    • Colon Cancer Paternal Grandfather    • Breast Cancer Paternal Cousin Female 59   • Arrhythmia Neg         sudden Past Medical Hx:   HTN, AF, aMI s/p stenting & aICD placement, BPH, CVA    Past Surgical Hx:   Denies    Family Hx:   Denies    Social Hx:    Tobacco: denies  EtOH: denies  IVDU/Illicit: denies Focused Assessment for CC: BL LE Swelling + Scrotal & Penile Swelling  BL LE are without gross deformity. BL LE w/ +2 pitting edema to medial and anterior thigh. LLE > RLE. No evidence of trauma to BLLE. Sensation intact BL feet and LE.     GENERAL:  WNWD Young/Middle-Aged/Elderly M/F  EYE: EOMI, symmetrical lids, normal conjunctiva  ENMT: Atraumatic external nose and ears, moist mucous membranes  NECK: Symmetric, no tracheal deviation  CVS: RRR, No murmurs appreciated.  RESP: Unlabored respiratory effort, no central cyanosis, no evidence for respiratory distress, lungs CTAB  GI: Abdomen is non-distended, non-tympanic, soft and non-tender.  MSK: No edema, no deformities. No ROM limitation. Strength 5/5.  DERM: Skin is warm, dry. No rashes or lesions appreciated during exam.  NEURO: AAOx3. Moving all 4 extremities without limitation. No facial droop. No dysarthria.  PSYCH: Appropriate mood and affect during encounter. Occupational Exposure: Not Asked        Hobby Hazards: Not Asked        Sleep Concern: Not Asked        Stress Concern: Not Asked        Weight Concern: Not Asked        Special Diet: Not Asked        Back Care: Not Asked        Exercise: Yes          walk negative tenderness in the left buttocks.     SLR: Left positive at 75 degrees/right negative  Adriana's test: negative    Right Knee Deep tendon reflexes: Symmetric   Right Ankle Deep tendon reflexes: Symmetric     Left Knee Deep tendon reflexes: Symmetric central canal narrowing and mild bilateral neural foraminal narrowing.   L5-S1:   Disk desiccation with bulging disk, facet, and ligamentous hypertrophy results in moderate central canal narrowing and moderate right and severe left neural foraminal narrowin left L5 nerve root.      PLAN:  RECOMMENDATIONS:  1) left transforaminal epidural steroid injection L5/S1 with fluoroscopic guidance  I have informed Alejandra Blanco  of the risks of neuraxial anesthesia including, but not limited to: failure, headache, ba

## 2021-02-24 NOTE — PROGRESS NOTES
02/24/2021-presents ambulatory to C[facility] to establish care; NEW CONSULT C/O  Referred by Dr. Harlan Becerra PCP; Examined by Dr. Thena Gowers, reviewed MRI; Refer to dictation for the POC.

## 2021-02-24 NOTE — PROGRESS NOTES
02/24/2021-presents ambulatory to CPM to establish care; NEW CONSULT C/O LBP, LLE SCIATICA; pt also c/o bilat. shldr pain; Hx of lami 2013; ptreports this pain began Nov. 2020; Referred by Dr. Sean Frederick PCP; Examined by Dr. Juan Pablo Yang, reviewed MRI;   Refer to

## 2021-03-01 ENCOUNTER — DOCUMENTATION ONLY (OUTPATIENT)
Dept: PAIN CLINIC | Facility: HOSPITAL | Age: 61
End: 2021-03-01

## 2021-03-01 NOTE — PROGRESS NOTES
Procedure code 25886--GIIBORB APPROVAL       Molly/Ketty-- PA needed via [address] [place] [address] at 11:32 am, spoke with case rep   Case requires additional info to be faxed in   Fax # [phone]  Pending case # [id number]  Once case has been approved writer wi

## 2021-03-15 ENCOUNTER — DOCUMENTATION ONLY (OUTPATIENT)
Dept: PAIN CLINIC | Facility: HOSPITAL | Age: 61
End: 2021-03-15

## 2021-03-15 NOTE — PROGRESS NOTES
Procedure code 87471---05/22/2021     Molly/north--- Auth received via [employer] # [id number]  Valid from 03/02/2021---08/29/2021      Order form faxed to the surgery center, confirmation rcv'd

## 2021-03-16 RX ORDER — SPIRONOLACTONE 50 MG/1
50 TABLET, FILM COATED ORAL DAILY
Qty: 90 TABLET | Refills: 1 | Status: SHIPPED | OUTPATIENT
Start: 2021-03-16 | End: 2021-08-03

## 2021-03-16 NOTE — TELEPHONE ENCOUNTER
Refill request    Current Outpatient Medications   Medication Sig Dispense Refill   • spironolactone 50 MG Oral Tab Take 1 tablet (50 mg total) by mouth daily.  90 tablet 1

## 2021-04-09 ENCOUNTER — TELEMEDICINE (OUTPATIENT)
Dept: FAMILY MEDICINE CLINIC | Facility: CLINIC | Age: 61
End: 2021-04-09

## 2021-04-09 DIAGNOSIS — M54.9 BACK PAIN WITH HISTORY OF SPINAL SURGERY: Primary | ICD-10-CM

## 2021-04-09 DIAGNOSIS — Z98.890 BACK PAIN WITH HISTORY OF SPINAL SURGERY: Primary | ICD-10-CM

## 2021-04-09 PROCEDURE — 99213 OFFICE O/P EST LOW 20 MIN: CPT | Performed by: FAMILY MEDICINE

## 2021-04-09 RX ORDER — CELECOXIB 200 MG/1
200 CAPSULE ORAL DAILY
Qty: 90 CAPSULE | Refills: 3 | COMMUNITY
Start: 2021-04-09 | End: 2021-05-06

## 2021-04-09 RX ORDER — TRAMADOL HYDROCHLORIDE 50 MG/1
50 TABLET ORAL EVERY 6 HOURS PRN
Qty: 30 TABLET | Refills: 1 | Status: SHIPPED | OUTPATIENT
Start: 2021-04-09 | End: 2021-06-25

## 2021-04-09 NOTE — PROGRESS NOTES
HPI/Subjective:   Patient ID: Michelle Christie is a 63year old female. This visit is conducted using Telemedicine with live, interactive video and audio.     Patient has been referred to the [facility] website at [email or web address] to review the yearly in about 12 days. Having ongoing severe sciatic pain with difficulty sleeping. Currently using ibuprofen as needed. Plan to try Celebrex 200 mg daily which she has at home but has not used. Also tramadol as directed, particularly at bedtime.   Can take

## 2021-04-22 ENCOUNTER — SURGERY CENTER DOCUMENTATION (OUTPATIENT)
Dept: SURGERY | Age: 61
End: 2021-04-22

## 2021-04-22 NOTE — PROCEDURES
Asaf OCONNOR 7.            Patient: Roman Rae  MR #: [id number]  : 10/6/1960        Operative Report        Date of procedure: 2021    Preoperative diagnosis: Radiculopathy left, DDD, NFS L5/S1  Postop diagnosis:Radiculopath instructions. Electronically approved by: Malcom Lopez MD

## 2021-05-06 ENCOUNTER — OFFICE VISIT (OUTPATIENT)
Dept: PAIN CLINIC | Facility: HOSPITAL | Age: 61
End: 2021-05-06
Attending: NURSE PRACTITIONER
Payer: COMMERCIAL

## 2021-05-06 VITALS — HEART RATE: 76 BPM | SYSTOLIC BLOOD PRESSURE: 132 MMHG | OXYGEN SATURATION: 96 % | DIASTOLIC BLOOD PRESSURE: 82 MMHG

## 2021-05-06 DIAGNOSIS — M54.16 SPINAL STENOSIS OF LUMBAR REGION WITH RADICULOPATHY: ICD-10-CM

## 2021-05-06 DIAGNOSIS — M54.16 LUMBAR RADICULOPATHY: Primary | ICD-10-CM

## 2021-05-06 DIAGNOSIS — M79.18 DIFFUSE MYOFASCIAL PAIN SYNDROME: ICD-10-CM

## 2021-05-06 DIAGNOSIS — M48.061 SPINAL STENOSIS OF LUMBAR REGION WITH RADICULOPATHY: ICD-10-CM

## 2021-05-06 DIAGNOSIS — M54.9 BACK PAIN WITH HISTORY OF SPINAL SURGERY: ICD-10-CM

## 2021-05-06 DIAGNOSIS — Z98.890 BACK PAIN WITH HISTORY OF SPINAL SURGERY: ICD-10-CM

## 2021-05-06 DIAGNOSIS — M15.9 PRIMARY OSTEOARTHRITIS INVOLVING MULTIPLE JOINTS: ICD-10-CM

## 2021-05-06 DIAGNOSIS — M48.07 NEUROFORAMINAL STENOSIS OF LUMBOSACRAL SPINE: ICD-10-CM

## 2021-05-06 DIAGNOSIS — G62.9 POLYNEUROPATHY: ICD-10-CM

## 2021-05-06 PROCEDURE — 99211 OFF/OP EST MAY X REQ PHY/QHP: CPT

## 2021-05-06 NOTE — PROGRESS NOTES
PT presents Ambulator to the CPM.  PT reports 80% improvement. PT reports she is very please with the results. Still has LLE numbness greater in her foot. RADHA Ugarte saw for Left trans L5-S1,  See notes for POC.

## 2021-05-06 NOTE — CHRONIC PAIN
Follow-up Note  CC: injection follow up   HISTORY OF PRESENT ILLNESS:  Maverick Lowe is a 63year old old female, originally referred to the pain clinic by Dr. Marie ref.  provider found, with history of Lumbar radiculopathy  (primary encounter diagnosis) (MULTI-VITAMIN GUMMIES) Oral Chew Tab      • MONTELUKAST SODIUM 10 MG Oral Tab TAKE 1 TABLET BY MOUTH AT BEDTIME 30 tablet 11   • Albuterol Sulfate HFA (PROAIR HFA) 108 (90 Base) MCG/ACT Inhalation Aero Soln Inhale 2 puffs into the lungs every 4 (four) rina • Migraines    • Osteoarthritis    • Primary osteoarthritis of first carpometacarpal joint of right hand 5/4/2016   • Rotator cuff syndrome, right 2/9/2017   • Sx.7/18/16, CPT.06698, R Mid Trigger Finger Release, w/, Global Exp.10/16/16 5/4/20 on file    Tobacco Use      Smoking status: Never Smoker      Smokeless tobacco: Never Used    Vaping Use      Vaping Use: Never used    Substance and Sexual Activity      Alcohol use: Yes        Comment: moderate intake, occasionally      Drug use:  No Partner Violence:       Fear of Current or Ex-Partner:       Emotionally Abused:       Physically Abused:       Sexually Abused:   ADVANCE CARE PLANNING:    Advance Care Plan NOT discussed. PHYSICAL EXAMINATION:  There were no vitals filed for this visit. canal or foraminal narrowing. L2-L3: Mild hypertrophic changes of the facet joints and ligamentum flavum without significant canal or foraminal narrowing.    L3-L4: Disk desiccation with bulging disk, facet, and ligamentous hypertrophy results in mild dony discussed at length including pharmacotherapy (eg. Anti- inflammatories, muscle relaxants, neuropathic medications, oral steroids, analgesics), injections, and further testing.  Risks and benefits of all options were discussed at length to patients satisfac

## 2021-05-19 ENCOUNTER — TELEPHONE (OUTPATIENT)
Dept: FAMILY MEDICINE CLINIC | Facility: CLINIC | Age: 61
End: 2021-05-19

## 2021-05-19 NOTE — TELEPHONE ENCOUNTER
Mammogram completed 4/5/2019  Annual physical 2/4/2019    Patient has upcoming physical 5/27/2021.   Will get an order at that time for mammogram.

## 2021-05-27 ENCOUNTER — OFFICE VISIT (OUTPATIENT)
Dept: FAMILY MEDICINE CLINIC | Facility: CLINIC | Age: 61
End: 2021-05-27
Payer: COMMERCIAL

## 2021-05-27 VITALS
SYSTOLIC BLOOD PRESSURE: 116 MMHG | HEIGHT: 64 IN | HEART RATE: 91 BPM | TEMPERATURE: 98 F | RESPIRATION RATE: 18 BRPM | BODY MASS INDEX: 31.72 KG/M2 | DIASTOLIC BLOOD PRESSURE: 74 MMHG | WEIGHT: 185.81 LBS

## 2021-05-27 DIAGNOSIS — Z86.010 PERSONAL HISTORY OF COLONIC POLYPS: ICD-10-CM

## 2021-05-27 DIAGNOSIS — M20.61 ACQUIRED TOE DEFORMITY, RIGHT: ICD-10-CM

## 2021-05-27 DIAGNOSIS — I10 ESSENTIAL HYPERTENSION: ICD-10-CM

## 2021-05-27 DIAGNOSIS — Z01.419 ENCOUNTER FOR GYNECOLOGICAL EXAMINATION WITHOUT ABNORMAL FINDING: Primary | ICD-10-CM

## 2021-05-27 DIAGNOSIS — Z12.31 ENCOUNTER FOR SCREENING MAMMOGRAM FOR BREAST CANCER: ICD-10-CM

## 2021-05-27 DIAGNOSIS — G62.9 POLYNEUROPATHY: ICD-10-CM

## 2021-05-27 DIAGNOSIS — E78.00 HYPERCHOLESTEROLEMIA: ICD-10-CM

## 2021-05-27 DIAGNOSIS — L81.9 PIGMENTED SKIN LESION OF UNCERTAIN NATURE: ICD-10-CM

## 2021-05-27 PROCEDURE — 3074F SYST BP LT 130 MM HG: CPT | Performed by: FAMILY MEDICINE

## 2021-05-27 PROCEDURE — 3008F BODY MASS INDEX DOCD: CPT | Performed by: FAMILY MEDICINE

## 2021-05-27 PROCEDURE — 90471 IMMUNIZATION ADMIN: CPT | Performed by: FAMILY MEDICINE

## 2021-05-27 PROCEDURE — 3078F DIAST BP <80 MM HG: CPT | Performed by: FAMILY MEDICINE

## 2021-05-27 PROCEDURE — 99396 PREV VISIT EST AGE 40-64: CPT | Performed by: FAMILY MEDICINE

## 2021-05-27 PROCEDURE — 90750 HZV VACC RECOMBINANT IM: CPT | Performed by: FAMILY MEDICINE

## 2021-05-27 RX ORDER — GABAPENTIN 300 MG/1
300 CAPSULE ORAL NIGHTLY
Qty: 90 CAPSULE | Refills: 0 | Status: SHIPPED | OUTPATIENT
Start: 2021-05-27 | End: 2021-06-22

## 2021-05-27 NOTE — PROGRESS NOTES
HPI/Subjective:   Patient ID: Roman Rae is a 63year old female. HPI    History/Other:   Review of Systems   Constitutional: Negative. Respiratory: Negative. Cardiovascular: Negative. Gastrointestinal: Negative.     Musculoskeletal: Posit appearance, no masses  Abdominal:      Palpations: Abdomen is soft. There is no mass. Tenderness: There is no abdominal tenderness. Musculoskeletal:      Comments: See below   Lymphadenopathy:      Cervical: No cervical adenopathy.    Skin:     Gener infection/ulcers.     Orders Placed This Encounter      [employer] FPGS PAP AND HR HPV DNA      Lipid Panel      Comp Metabolic Panel (14)      Assay, Thyroid Stim Hormone      CBC With Differential With Platelet      Vitamin B12 [E]      Zoster Recombinant A

## 2021-06-05 ENCOUNTER — PATIENT MESSAGE (OUTPATIENT)
Dept: FAMILY MEDICINE CLINIC | Facility: CLINIC | Age: 61
End: 2021-06-05

## 2021-06-05 ENCOUNTER — HOSPITAL ENCOUNTER (OUTPATIENT)
Dept: MAMMOGRAPHY | Facility: HOSPITAL | Age: 61
Discharge: HOME | End: 2021-06-05
Attending: FAMILY MEDICINE
Payer: COMMERCIAL

## 2021-06-05 DIAGNOSIS — Z12.31 BREAST CANCER SCREENING BY MAMMOGRAM: Primary | ICD-10-CM

## 2021-06-05 DIAGNOSIS — Z12.31 ENCOUNTER FOR SCREENING MAMMOGRAM FOR BREAST CANCER: ICD-10-CM

## 2021-06-05 NOTE — TELEPHONE ENCOUNTER
From: Leslie Damonr  To: Nae Miranda MD  Sent: 6/5/2021 9:24 AM CDT  Subject: Referral Request    I went this am for mammogram and they said I need a referral for a diagnostic mammogram and didn't do the mammogram. My last test was April 2019 and I

## 2021-06-07 NOTE — TELEPHONE ENCOUNTER
Patient called to make sure order for mammogram is in the system. Verified with patient order is in the system and order copied and sent via [employer].

## 2021-06-07 NOTE — TELEPHONE ENCOUNTER
Hypertensive Medications: Refilled per protocol    Protocol Criteria:  · Appointment scheduled in the past 6 months or in the next 3 months  · BMP or CMP in the past 12 months  · Creatinine result < 2  Recent Visits       Provider Department Primary Dx Received request via: Patient    Was the patient seen in the last year in this department? Yes    Does the patient have an active prescription (recently filled or refills available) for medication(s) requested? No     Requested Prescriptions     Pending Prescriptions Disp Refills   • lisinopril (PRINIVIL) 40 MG tablet 90 tablet 3     Sig: Take 1 tablet by mouth every day.

## 2021-06-08 ENCOUNTER — HOSPITAL ENCOUNTER (OUTPATIENT)
Dept: MAMMOGRAPHY | Age: 61
Discharge: HOME | End: 2021-06-08
Attending: FAMILY MEDICINE
Payer: COMMERCIAL

## 2021-06-08 PROCEDURE — 77063 BREAST TOMOSYNTHESIS BI: CPT | Performed by: FAMILY MEDICINE

## 2021-06-08 PROCEDURE — 77067 SCR MAMMO BI INCL CAD: CPT | Performed by: FAMILY MEDICINE

## 2021-06-09 RX ORDER — ATORVASTATIN CALCIUM 40 MG/1
40 TABLET, FILM COATED ORAL NIGHTLY
Qty: 90 TABLET | Refills: 3 | Status: SHIPPED | OUTPATIENT
Start: 2021-06-09

## 2021-06-10 ENCOUNTER — TELEPHONE (OUTPATIENT)
Dept: GASTROENTEROLOGY | Facility: CLINIC | Age: 61
End: 2021-06-10

## 2021-06-10 NOTE — TELEPHONE ENCOUNTER
Patient contacted and advised that she is due for a colon recall in 3 years. Last colonoscopy was done on 11/05/19. Patient voiced understanding.

## 2021-06-22 ENCOUNTER — LAB ENCOUNTER (OUTPATIENT)
Dept: LAB | Facility: HOSPITAL | Age: 61
End: 2021-06-22
Attending: Other
Payer: COMMERCIAL

## 2021-06-22 ENCOUNTER — OFFICE VISIT (OUTPATIENT)
Dept: NEUROLOGY | Facility: CLINIC | Age: 61
End: 2021-06-22
Payer: COMMERCIAL

## 2021-06-22 ENCOUNTER — HOSPITAL ENCOUNTER (OUTPATIENT)
Dept: GENERAL RADIOLOGY | Facility: HOSPITAL | Age: 61
Discharge: HOME | End: 2021-06-22
Attending: PODIATRIST
Payer: COMMERCIAL

## 2021-06-22 ENCOUNTER — OFFICE VISIT (OUTPATIENT)
Dept: PODIATRY CLINIC | Facility: CLINIC | Age: 61
End: 2021-06-22
Payer: COMMERCIAL

## 2021-06-22 VITALS — BODY MASS INDEX: 31.58 KG/M2 | WEIGHT: 185 LBS | HEIGHT: 64 IN

## 2021-06-22 DIAGNOSIS — G62.1 ALCOHOLIC POLYNEUROPATHY (HCC): Primary | ICD-10-CM

## 2021-06-22 DIAGNOSIS — M21.611 BUNION, RIGHT FOOT: ICD-10-CM

## 2021-06-22 DIAGNOSIS — M20.41 HAMMER TOE OF RIGHT FOOT: Primary | ICD-10-CM

## 2021-06-22 DIAGNOSIS — M20.41 HAMMER TOE OF RIGHT FOOT: ICD-10-CM

## 2021-06-22 PROCEDURE — 3008F BODY MASS INDEX DOCD: CPT | Performed by: OTHER

## 2021-06-22 PROCEDURE — 99214 OFFICE O/P EST MOD 30 MIN: CPT | Performed by: OTHER

## 2021-06-22 PROCEDURE — 73630 X-RAY EXAM OF FOOT: CPT | Performed by: PODIATRIST

## 2021-06-22 PROCEDURE — 84207 ASSAY OF VITAMIN B-6: CPT

## 2021-06-22 PROCEDURE — 84425 ASSAY OF VITAMIN B-1: CPT

## 2021-06-22 PROCEDURE — 99203 OFFICE O/P NEW LOW 30 MIN: CPT | Performed by: PODIATRIST

## 2021-06-22 PROCEDURE — 36415 COLL VENOUS BLD VENIPUNCTURE: CPT

## 2021-06-22 RX ORDER — GABAPENTIN 300 MG/1
300 CAPSULE ORAL 3 TIMES DAILY
Qty: 270 CAPSULE | Refills: 3 | Status: SHIPPED | OUTPATIENT
Start: 2021-06-22 | End: 2021-10-26

## 2021-06-22 NOTE — PROGRESS NOTES
Neurology Follow up Visit     Referred By: Dr. Marie ref. provider found    Chief Complaint: Patient presents with:  Neurologic Problem: LOV: 4/23/18. F/U on neuropathy. Patient states that the neuropathy has progressed.  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   • Endometriosis    • Hearing impairment     BILATERAL HEARING AIDS   • High blood pressure    • High cholesterol    • Lumbar disc herniation 2013 2013 discectomy   • Jagjit Grandfather    • Breast Cancer Paternal Cousin Female 59   • Arrhythmia Neg         sudden cardiac death, family h/o         Current Outpatient Medications:   •  atorvastatin 40 MG Oral Tab, Take 1 tablet (40 mg total) by mouth nightly., Disp: 90 tablet, R concentration  Thought process intact  Memory intact- recent and remote  Mood intact  Fund of knowledge appropriate for education and age    Language intact including: comprehension, naming, repetition, vocabulary    Cranial Nerves:  II.- Visual fields ful normal.      Assessment   1. Polyneuropathy    So far not any obvious etiology except of alcohol induced neuropathy. Alcohol cessation was advised. We will check B complex vitamins. Patient was advised to start taking them for supplementation.   Meantime

## 2021-06-22 NOTE — PROGRESS NOTES
HPI:    Patient ID: Lupillo Chacon is a 63year old female. Pleasant 56-year-old female presents to the office today having not been seen she thinks in about 5 years.   The reason she is here is because she is aware of progressive and increasing deformit (50 mg total) by mouth every 6 (six) hours as needed for Pain.  (Patient not taking: Reported on 6/22/2021 ) 30 tablet 1     Allergies:  Bronopol                SWELLING  Darvocet [Propoxyph*    RASH, SWELLING  Penicillins             RASH  Sulfa Antibiotic ordered in this encounter       Imaging & Referrals:  None       PZ#[id number]

## 2021-06-23 ENCOUNTER — TELEPHONE (OUTPATIENT)
Dept: PODIATRY CLINIC | Facility: CLINIC | Age: 61
End: 2021-06-23

## 2021-06-23 DIAGNOSIS — M21.611 BUNION, RIGHT: ICD-10-CM

## 2021-06-23 DIAGNOSIS — M20.41 HAMMER TOE OF RIGHT FOOT: Primary | ICD-10-CM

## 2021-06-23 NOTE — TELEPHONE ENCOUNTER
Received surgical scheduling request for patient for Bunionectomy, right foot and correction of hammertoes with fixation, second, third, and fourth toes, right foot.  Called patient this date and she requested surgery on 8/6/21 which was scheduled this date

## 2021-06-28 ENCOUNTER — TELEPHONE (OUTPATIENT)
Dept: NEUROLOGY | Facility: CLINIC | Age: 61
End: 2021-06-28

## 2021-06-28 NOTE — TELEPHONE ENCOUNTER
----- Message from Marilee Anderson MD sent at 6/28/2021 12:46 PM CDT -----  Please let the patient know that results of these particular lab tests so far were normal.    Thank you

## 2021-07-04 ENCOUNTER — PATIENT MESSAGE (OUTPATIENT)
Dept: PODIATRY CLINIC | Facility: CLINIC | Age: 61
End: 2021-07-04

## 2021-07-06 NOTE — TELEPHONE ENCOUNTER
Called patient this date and left a message relating that I was waiting on confirmation from the surgery center before calling and they have not confirmed her 8/6/21 surgery yet.   However, left message for her to call me directly at [phone] to resched

## 2021-07-06 NOTE — TELEPHONE ENCOUNTER
Patient called back this date and states her boss is out of town. She will call me back next week to reschedule the procedure.

## 2021-07-12 NOTE — TELEPHONE ENCOUNTER
Patient called in and requested surgery be rescheduled to 9/3/21 which was done so this date. Told patient I would call her back to review details once [facility] confirms. She voiced understanding.

## 2021-07-13 ENCOUNTER — OFFICE VISIT (OUTPATIENT)
Dept: FAMILY MEDICINE CLINIC | Facility: CLINIC | Age: 61
End: 2021-07-13
Payer: COMMERCIAL

## 2021-07-13 VITALS
BODY MASS INDEX: 31.48 KG/M2 | TEMPERATURE: 97 F | HEIGHT: 64 IN | SYSTOLIC BLOOD PRESSURE: 115 MMHG | DIASTOLIC BLOOD PRESSURE: 73 MMHG | RESPIRATION RATE: 18 BRPM | WEIGHT: 184.38 LBS | HEART RATE: 93 BPM

## 2021-07-13 DIAGNOSIS — L81.9 PIGMENTED SKIN LESION OF UNCERTAIN NATURE: Primary | ICD-10-CM

## 2021-07-13 PROCEDURE — 3008F BODY MASS INDEX DOCD: CPT | Performed by: FAMILY MEDICINE

## 2021-07-13 PROCEDURE — 3074F SYST BP LT 130 MM HG: CPT | Performed by: FAMILY MEDICINE

## 2021-07-13 PROCEDURE — 11300 SHAVE SKIN LESION 0.5 CM/<: CPT | Performed by: FAMILY MEDICINE

## 2021-07-13 PROCEDURE — 3078F DIAST BP <80 MM HG: CPT | Performed by: FAMILY MEDICINE

## 2021-07-13 NOTE — PROGRESS NOTES
HPI/Subjective:   Patient ID: Georgie Rizzo is a 63year old female.     Patient presents for pigmented skin lesion shaving      History/Other:   Review of Systems  Current Outpatient Medications   Medication Sig Dispense Refill   • gabapentin 300 MG Ora Referrals:  None

## 2021-07-13 NOTE — TELEPHONE ENCOUNTER
Patient had requested surgery be moved to 9/3/21 (separate encounter) which was done at [facility]. Called patient this date. Reviewed pre-op instructions and also sent thru My Chart this date. Scheduled two post op visits and placed order for managed care.

## 2021-07-15 ENCOUNTER — PATIENT MESSAGE (OUTPATIENT)
Dept: PODIATRY CLINIC | Facility: CLINIC | Age: 61
End: 2021-07-15

## 2021-07-16 NOTE — TELEPHONE ENCOUNTER
From: Joseph Jose  To: Jessi Frances DPM  Sent: 7/15/2021 8:49 PM CDT  Subject: Non-Urgent Medical Question    I think I may have broken my little toe 2 weeks ago when I hit the corner of the wall in my kitchen.  It's on my right foot and I'm w

## 2021-07-16 NOTE — TELEPHONE ENCOUNTER
Called pt and she states 2 wks ago she bumped her right 5th toe on a cabinet and since then she has had pain, swelling and bruising in the toe. She denies seeking care for this injury.  I advised that pt should be evaluated at an  for injury and possible

## 2021-07-19 DIAGNOSIS — D23.9 DYSPLASTIC NEVUS: Primary | ICD-10-CM

## 2021-07-19 RX ORDER — LISINOPRIL 20 MG/1
20 TABLET ORAL DAILY
Qty: 90 TABLET | Refills: 3 | Status: SHIPPED | OUTPATIENT
Start: 2021-07-19

## 2021-07-19 NOTE — TELEPHONE ENCOUNTER
Jeff Schultz needs a refill of:    • lisinopril 20 MG Oral Tab Take 1 tablet (20 mg total) by mouth daily.  90 tablet 3

## 2021-08-03 RX ORDER — SPIRONOLACTONE 50 MG/1
50 TABLET, FILM COATED ORAL DAILY
Qty: 90 TABLET | Refills: 3 | Status: SHIPPED | OUTPATIENT
Start: 2021-08-03 | End: 2021-09-11

## 2021-08-03 NOTE — TELEPHONE ENCOUNTER
DR Washington=medication pended    Passed protocol but showed HIGH WARNING for lisinopril and spironolactone, medication pended for approval.    Refill passed per [employer] protocol.      Requested Prescriptions   Pending Prescriptions Disp Refills    SPIRONOLACTONE 50 MG Oral Tab [Pharmacy Med Name: SPIRONOLACTONE 50MG TABLETS] 90 tablet 1     Sig: TAKE 1 TABLET(50 MG) BY MOUTH DAILY        Hypertensive Medications Protocol Passed - 8/3/2021  8:04 AM        Passed - CMP or BMP in past 12 months        Passed - Appointment in past 6 or next 3 months        Passed - GFR Non- > 50     Lab Results   Component Value Date    [place] [address] 05/27/2021                      Future Appointments         Provider Department Appt Notes    [facility] [address], [facility] 1st post op visit on 9/3/21    [facility] [address] 2nd post op visit sx on 9/3/21             Recent Outpatient Visits              3 weeks ago Pigmented skin lesion of uncertain nature    [facility], Nam Jesus MD    Office Visit    1 month ago [employer] of right foot    [facility], [address], [address], Chucky Mercado, DPM    Office Visit    1 month ago Alcoholic polyneuropathy [facility])    MALINDA Tavarez MD    Office Visit    2 months ago Encounter for gynecological examination without abnormal finding    [facility], [employer], [address] [place], [place] [address]Nam MD    Office Visit    2 months ago Lumbar radiculopathy    [facility] DAVE Hayes    Office Visit

## 2021-08-11 ENCOUNTER — TELEPHONE (OUTPATIENT)
Dept: FAMILY MEDICINE CLINIC | Facility: CLINIC | Age: 61
End: 2021-08-11

## 2021-08-12 RX ORDER — MONTELUKAST SODIUM 10 MG/1
10 TABLET ORAL NIGHTLY
Qty: 90 TABLET | Refills: 1 | Status: SHIPPED | OUTPATIENT
Start: 2021-08-12

## 2021-08-12 NOTE — TELEPHONE ENCOUNTER
Refilled per [facility], [facility] protocol. Requested Prescriptions   Pending Prescriptions Disp Refills    Montelukast Sodium 10 MG Oral Tab 30 tablet 11     Sig: Take 1 tablet (10 mg total) by mouth nightly.  TAKE AT BEDTIME        Asthma & COPD Medication Pr

## 2021-08-12 NOTE — TELEPHONE ENCOUNTER
Amilcar López needs a refill of:    • MONTELUKAST SODIUM 10 MG Oral Tab TAKE 1 TABLET BY MOUTH AT BEDTIME 30 tablet 11

## 2021-08-19 ENCOUNTER — NURSE ONLY (OUTPATIENT)
Dept: FAMILY MEDICINE CLINIC | Facility: CLINIC | Age: 61
End: 2021-08-19
Payer: COMMERCIAL

## 2021-08-19 DIAGNOSIS — Z23 NEED FOR VACCINATION: Primary | ICD-10-CM

## 2021-08-19 PROCEDURE — 90750 HZV VACC RECOMBINANT IM: CPT | Performed by: FAMILY MEDICINE

## 2021-08-19 PROCEDURE — 90471 IMMUNIZATION ADMIN: CPT | Performed by: FAMILY MEDICINE

## 2021-08-24 ENCOUNTER — PATIENT MESSAGE (OUTPATIENT)
Dept: FAMILY MEDICINE CLINIC | Facility: CLINIC | Age: 61
End: 2021-08-24

## 2021-08-24 ENCOUNTER — NURSE ONLY (OUTPATIENT)
Dept: LAB | Facility: HOSPITAL | Age: 61
End: 2021-08-24
Attending: FAMILY MEDICINE
Payer: COMMERCIAL

## 2021-08-24 DIAGNOSIS — R50.9 FEVER, UNSPECIFIED FEVER CAUSE: ICD-10-CM

## 2021-08-24 DIAGNOSIS — R50.9 FEVER, UNSPECIFIED FEVER CAUSE: Primary | ICD-10-CM

## 2021-08-24 NOTE — TELEPHONE ENCOUNTER
Spoke with An Jimenez per Dr. Yung Bella instruction and notified her that Dr. Jocelynn Miranda placed a coivid order for her to get tested as she feels the fever is not from the Shingles vaccine. Patient also notified to stay home from work until results back.  Patient anita

## 2021-08-24 NOTE — TELEPHONE ENCOUNTER
Received 2nd shingles vaccine 8/19/2021. Reports headache, fatigue, fever, chills, nausea, redness to injection site with low grade temp. Taking Tylenol/ Motrin PRN. Temp of 101-102.5 fever last night. (today is Day5). Denies fever, headache today.   Sta

## 2021-08-24 NOTE — TELEPHONE ENCOUNTER
Please let patient know I strongly suspect fever due to shingles vaccine. But because of her job I do recommend she be tested for Covid. I have entered order. Should not return to work until results obtained.

## 2021-08-25 ENCOUNTER — APPOINTMENT (OUTPATIENT)
Dept: URBAN - METROPOLITAN AREA CLINIC 321 | Age: 61
Setting detail: DERMATOLOGY
End: 2021-08-25

## 2021-08-25 DIAGNOSIS — D22 MELANOCYTIC NEVI: ICD-10-CM

## 2021-08-25 PROBLEM — D22.4 MELANOCYTIC NEVI OF SCALP AND NECK: Status: ACTIVE | Noted: 2021-08-25

## 2021-08-25 PROBLEM — D22.62 MELANOCYTIC NEVI OF LEFT UPPER LIMB, INCLUDING SHOULDER: Status: ACTIVE | Noted: 2021-08-25

## 2021-08-25 PROCEDURE — 99024 POSTOP FOLLOW-UP VISIT: CPT

## 2021-08-25 PROCEDURE — OTHER SUTURE REMOVAL (GLOBAL PERIOD): OTHER

## 2021-08-25 ASSESSMENT — LOCATION SIMPLE DESCRIPTION DERM
LOCATION SIMPLE: NECK
LOCATION SIMPLE: LEFT SHOULDER

## 2021-08-25 ASSESSMENT — LOCATION DETAILED DESCRIPTION DERM
LOCATION DETAILED: LEFT ANTERIOR SHOULDER
LOCATION DETAILED: RIGHT CENTRAL LATERAL NECK

## 2021-08-25 ASSESSMENT — LOCATION ZONE DERM
LOCATION ZONE: NECK
LOCATION ZONE: ARM

## 2021-08-25 NOTE — PROCEDURE: SUTURE REMOVAL (GLOBAL PERIOD)
Detail Level: Simple
Add 48808 Cpt? (Important Note: In 2017 The Use Of 14540 Is Being Tracked By Cms To Determine Future Global Period Reimbursement For Global Periods): yes
Body Location Override (Optional - Billing Will Still Be Based On Selected Body Map Location If Applicable): L shoulder

## 2021-08-26 LAB — SARS-COV-2 RNA RESP QL NAA+PROBE: NOT DETECTED

## 2021-09-01 ENCOUNTER — LAB REQUISITION (OUTPATIENT)
Dept: SURGERY | Age: 61
End: 2021-09-01
Payer: COMMERCIAL

## 2021-09-01 DIAGNOSIS — Z01.818 PREOP EXAMINATION: ICD-10-CM

## 2021-09-01 LAB — SARS-COV-2 RNA RESP QL NAA+PROBE: NOT DETECTED

## 2021-09-10 ENCOUNTER — OFFICE VISIT (OUTPATIENT)
Dept: PODIATRY CLINIC | Facility: CLINIC | Age: 61
End: 2021-09-10
Payer: COMMERCIAL

## 2021-09-10 DIAGNOSIS — M21.611 BUNION, RIGHT FOOT: Primary | ICD-10-CM

## 2021-09-10 DIAGNOSIS — M20.41 HAMMER TOE OF RIGHT FOOT: ICD-10-CM

## 2021-09-10 PROCEDURE — 99024 POSTOP FOLLOW-UP VISIT: CPT | Performed by: PODIATRIST

## 2021-09-10 NOTE — PROGRESS NOTES
HPI:    Patient ID: Heidy Urbina is a 63year old female. He 2year-old female presents 1 week post right forefoot surgery. She has no specific noted complaints or concerns and states that she is doing well.   She has not taken any pain medication in this encounter.       Meds This Visit:  Requested Prescriptions      No prescriptions requested or ordered in this encounter       Imaging & Referrals:  None       #[id number]

## 2021-09-11 RX ORDER — SPIRONOLACTONE 50 MG/1
50 TABLET, FILM COATED ORAL DAILY
Qty: 90 TABLET | Refills: 1 | Status: SHIPPED | OUTPATIENT
Start: 2021-09-11

## 2021-09-11 NOTE — TELEPHONE ENCOUNTER
Refill passed per [facility], [facility] protocol. Requested Prescriptions   Pending Prescriptions Disp Refills    spironolactone 50 MG Oral Tab 90 tablet 1     Sig: Take 1 tablet (50 mg total) by mouth daily.         Hypertensive Medications Protocol Passed -

## 2021-09-11 NOTE — TELEPHONE ENCOUNTER
Refill passed per [facility], [facility] protocol. Requested Prescriptions   Pending Prescriptions Disp Refills    spironolactone 50 MG Oral Tab 90 tablet 3     Sig: Take 1 tablet (50 mg total) by mouth daily.         Hypertensive Medications Protocol Passed -

## 2021-09-17 ENCOUNTER — HOSPITAL ENCOUNTER (OUTPATIENT)
Dept: GENERAL RADIOLOGY | Facility: HOSPITAL | Age: 61
Discharge: HOME | End: 2021-09-17
Attending: PODIATRIST
Payer: COMMERCIAL

## 2021-09-17 ENCOUNTER — OFFICE VISIT (OUTPATIENT)
Dept: PODIATRY CLINIC | Facility: CLINIC | Age: 61
End: 2021-09-17
Payer: COMMERCIAL

## 2021-09-17 DIAGNOSIS — Z98.890 S/P BUNIONECTOMY: ICD-10-CM

## 2021-09-17 DIAGNOSIS — M20.41 HAMMER TOE OF RIGHT FOOT: ICD-10-CM

## 2021-09-17 DIAGNOSIS — M21.611 BUNION, RIGHT FOOT: ICD-10-CM

## 2021-09-17 DIAGNOSIS — Z98.890 S/P BUNIONECTOMY: Primary | ICD-10-CM

## 2021-09-17 PROCEDURE — 99024 POSTOP FOLLOW-UP VISIT: CPT | Performed by: PODIATRIST

## 2021-09-17 PROCEDURE — 73630 X-RAY EXAM OF FOOT: CPT | Performed by: PODIATRIST

## 2021-09-17 NOTE — PROGRESS NOTES
HPI:    Patient ID: Kaela Brumfield is a 63year old female. 78-year-old female presents postsurgical at 2 weeks. She has no specific noted complaints or concerns and is requiring no pain medication.       ROS:              Current Outpatient Medications foot  Hammer toe of right foot    No orders of the defined types were placed in this encounter.       Meds This Visit:  Requested Prescriptions      No prescriptions requested or ordered in this encounter       Imaging & Referrals:  None       PS#[id number]

## 2021-10-08 ENCOUNTER — OFFICE VISIT (OUTPATIENT)
Dept: PODIATRY CLINIC | Facility: CLINIC | Age: 61
End: 2021-10-08
Payer: COMMERCIAL

## 2021-10-08 DIAGNOSIS — M20.41 HAMMER TOE OF RIGHT FOOT: ICD-10-CM

## 2021-10-08 DIAGNOSIS — Z98.890 S/P BUNIONECTOMY: Primary | ICD-10-CM

## 2021-10-08 PROCEDURE — 99024 POSTOP FOLLOW-UP VISIT: CPT | Performed by: PODIATRIST

## 2021-10-08 NOTE — PROGRESS NOTES
HPI:    Patient ID: Modesto Haddad is a 57year old female. 26-year-old female presents postsurgical at approximately 5 weeks. She has no complaints. She has returned to work about 2 weeks ago and doing fine.       ROS:              Current Outpatient right foot    No orders of the defined types were placed in this encounter.       Meds This Visit:  Requested Prescriptions      No prescriptions requested or ordered in this encounter       Imaging & Referrals:  None       REN#[id number]

## 2021-10-21 ENCOUNTER — TELEPHONE (OUTPATIENT)
Dept: FAMILY MEDICINE CLINIC | Facility: CLINIC | Age: 61
End: 2021-10-21

## 2021-10-21 DIAGNOSIS — G89.29 CHRONIC PAIN OF LEFT KNEE: Primary | ICD-10-CM

## 2021-10-21 DIAGNOSIS — M25.562 CHRONIC PAIN OF LEFT KNEE: Primary | ICD-10-CM

## 2021-10-21 NOTE — TELEPHONE ENCOUNTER
----- Message from Corrinne Christen. Rachna Butt sent at 10/20/2021  8:41 PM CDT -----  Regarding: Chronic left knee pain  Dr. Zander Milian, I'm having a lot of pain again in my left knee that I'm finding difficult to deal with especially within the last 2 weeks.  I think th

## 2021-10-22 NOTE — TELEPHONE ENCOUNTER
Please let patient know I would be happy to see her to give steroid injection which should give relief for 1 to 2 months. Okay to give res24 within the next 2 weeks.   However, I have also entered a referral for orthopedics if she would prefer–they may off

## 2021-10-22 NOTE — TELEPHONE ENCOUNTER
Spoke with patient and scheduled patient using Res 24 slot for injection with Dr. Hema Bains.     Future Appointments   Date Time Provider Tracey Floodi   11/2/2021 11:45 AM Nichole Fam MD [address] [place] [address]   11/10/2021  8:40 AM Fuad Pierson,

## 2021-11-02 ENCOUNTER — OFFICE VISIT (OUTPATIENT)
Dept: FAMILY MEDICINE CLINIC | Facility: CLINIC | Age: 61
End: 2021-11-02
Payer: COMMERCIAL

## 2021-11-02 DIAGNOSIS — M17.12 OSTEOARTHRITIS OF LEFT KNEE, UNSPECIFIED OSTEOARTHRITIS TYPE: Primary | ICD-10-CM

## 2021-11-02 PROCEDURE — 20610 DRAIN/INJ JOINT/BURSA W/O US: CPT | Performed by: FAMILY MEDICINE

## 2021-11-02 RX ORDER — TRIAMCINOLONE ACETONIDE 40 MG/ML
40 INJECTION, SUSPENSION INTRA-ARTICULAR; INTRAMUSCULAR ONCE
Status: COMPLETED | OUTPATIENT
Start: 2021-11-02 | End: 2021-11-02

## 2021-11-02 RX ADMIN — TRIAMCINOLONE ACETONIDE 40 MG: 40 INJECTION, SUSPENSION INTRA-ARTICULAR; INTRAMUSCULAR at 18:37:00

## 2021-11-02 NOTE — PROGRESS NOTES
Subjective:   Patient ID: Leslie Settler is a 57year old female.     Patient presents for injection of left knee      History/Other:   Review of Systems  Current Outpatient Medications   Medication Sig Dispense Refill   • spironolactone 50 MG Oral Tab Ta Imaging & Referrals:  None

## 2021-11-10 ENCOUNTER — OFFICE VISIT (OUTPATIENT)
Dept: PODIATRY CLINIC | Facility: CLINIC | Age: 61
End: 2021-11-10
Payer: COMMERCIAL

## 2021-11-10 DIAGNOSIS — Z98.890 S/P BUNIONECTOMY: Primary | ICD-10-CM

## 2021-11-10 DIAGNOSIS — M20.41 HAMMER TOE OF RIGHT FOOT: ICD-10-CM

## 2021-11-10 PROCEDURE — 99024 POSTOP FOLLOW-UP VISIT: CPT | Performed by: PODIATRIST

## 2021-11-10 NOTE — PROGRESS NOTES
HPI:    Patient ID: Lorna Jeffries is a 57year old female. This 65-year-old female presents postsurgical at about 2 and half months for right forefoot procedures.   Patient has no specific complaints in fact states she cannot be happier she is pleased w requested or ordered in this encounter       Imaging & Referrals:  None       DARIN#[id number]

## 2021-12-03 ENCOUNTER — OFFICE VISIT (OUTPATIENT)
Dept: FAMILY MEDICINE CLINIC | Facility: CLINIC | Age: 61
End: 2021-12-03
Payer: COMMERCIAL

## 2021-12-03 VITALS
RESPIRATION RATE: 16 BRPM | WEIGHT: 177 LBS | HEART RATE: 98 BPM | BODY MASS INDEX: 30.22 KG/M2 | SYSTOLIC BLOOD PRESSURE: 127 MMHG | HEIGHT: 64 IN | DIASTOLIC BLOOD PRESSURE: 70 MMHG

## 2021-12-03 DIAGNOSIS — H66.92 ACUTE LEFT OTITIS MEDIA: Primary | ICD-10-CM

## 2021-12-03 PROCEDURE — 3074F SYST BP LT 130 MM HG: CPT | Performed by: NURSE PRACTITIONER

## 2021-12-03 PROCEDURE — 3078F DIAST BP <80 MM HG: CPT | Performed by: NURSE PRACTITIONER

## 2021-12-03 PROCEDURE — 99213 OFFICE O/P EST LOW 20 MIN: CPT | Performed by: NURSE PRACTITIONER

## 2021-12-03 PROCEDURE — 3008F BODY MASS INDEX DOCD: CPT | Performed by: NURSE PRACTITIONER

## 2021-12-03 RX ORDER — AZITHROMYCIN 250 MG/1
TABLET, FILM COATED ORAL
Qty: 6 TABLET | Refills: 0 | Status: SHIPPED | OUTPATIENT
Start: 2021-12-03 | End: 2021-12-08 | Stop reason: ALTCHOICE

## 2021-12-03 NOTE — PROGRESS NOTES
Beatrice Guidry is a 57year old female. Patient presents with:  Earache: left ear    HPI:   Patient presents to the clinic with complaints of left ear pain. The pain started a few weeks ago. Patient has a history of allergies.  Patient does take singular n Procedure Laterality Date   • BACK SURGERY     • CARPAL TUNNEL RELEASE  2009    [LATERALITY NOT STATED]   • COLONOSCOPY     • COLONOSCOPY N/A 11/5/2019    Procedure: COLONOSCOPY;  Surgeon: Ronn Gonzales MD;  Location: [facility] ENDOSCOPY   • [place] [id number] drip. Negative for sinus pressure and sore throat. Respiratory: Negative for cough, shortness of breath and wheezing. Cardiovascular: Negative for chest pain. Gastrointestinal: Negative. Genitourinary: Negative. Musculoskeletal: Negative.

## 2021-12-08 ENCOUNTER — OFFICE VISIT (OUTPATIENT)
Dept: INTERNAL MEDICINE CLINIC | Facility: CLINIC | Age: 61
End: 2021-12-08
Payer: COMMERCIAL

## 2021-12-08 VITALS
HEIGHT: 64 IN | SYSTOLIC BLOOD PRESSURE: 130 MMHG | DIASTOLIC BLOOD PRESSURE: 85 MMHG | WEIGHT: 177 LBS | HEART RATE: 89 BPM | BODY MASS INDEX: 30.22 KG/M2

## 2021-12-08 DIAGNOSIS — H92.02 ACUTE EAR PAIN, LEFT: Primary | ICD-10-CM

## 2021-12-08 PROCEDURE — 3075F SYST BP GE 130 - 139MM HG: CPT | Performed by: NURSE PRACTITIONER

## 2021-12-08 PROCEDURE — 3079F DIAST BP 80-89 MM HG: CPT | Performed by: NURSE PRACTITIONER

## 2021-12-08 PROCEDURE — 99212 OFFICE O/P EST SF 10 MIN: CPT | Performed by: NURSE PRACTITIONER

## 2021-12-08 PROCEDURE — 3008F BODY MASS INDEX DOCD: CPT | Performed by: NURSE PRACTITIONER

## 2021-12-08 RX ORDER — METHYLPREDNISOLONE 4 MG/1
TABLET ORAL
Qty: 21 TABLET | Refills: 0 | Status: SHIPPED | OUTPATIENT
Start: 2021-12-08 | End: 2022-01-18

## 2021-12-08 RX ORDER — FLUTICASONE PROPIONATE 50 MCG
2 SPRAY, SUSPENSION (ML) NASAL DAILY
Qty: 15.8 ML | Refills: 0 | Status: SHIPPED | OUTPATIENT
Start: 2021-12-08 | End: 2022-01-18

## 2021-12-08 NOTE — PROGRESS NOTES
Barbara White is a 57year old female. Patient presents with:  Ear Pain: Pt reports still having ear pain, has already finished z-jose roberto. HPI:   Patient reports to the office with left ear pain. Patient was seen on 12/3 and was prescribed a Zpak.  She co 5/4/2016   • Trigger index finger of right hand 6/22/2016      Past Surgical History:   Procedure Laterality Date   • BACK SURGERY     • CARPAL TUNNEL RELEASE  2009    [LATERALITY NOT STATED]   • COLONOSCOPY     • COLONOSCOPY N/A 11/5/2019    Procedure: CO Constitutional: Negative for fever. HENT: Positive for ear pain (left ear pain ) and postnasal drip. Respiratory: Negative for cough, shortness of breath and wheezing. Cardiovascular: Negative for chest pain. Gastrointestinal: Negative.     Salma

## 2021-12-17 ENCOUNTER — OFFICE VISIT (OUTPATIENT)
Dept: AUDIOLOGY | Facility: CLINIC | Age: 61
End: 2021-12-17
Payer: COMMERCIAL

## 2021-12-17 ENCOUNTER — OFFICE VISIT (OUTPATIENT)
Dept: OTOLARYNGOLOGY | Facility: CLINIC | Age: 61
End: 2021-12-17
Payer: COMMERCIAL

## 2021-12-17 ENCOUNTER — PATIENT MESSAGE (OUTPATIENT)
Dept: OTOLARYNGOLOGY | Facility: CLINIC | Age: 61
End: 2021-12-17

## 2021-12-17 VITALS — HEIGHT: 64 IN | WEIGHT: 175 LBS | BODY MASS INDEX: 29.88 KG/M2 | TEMPERATURE: 98 F

## 2021-12-17 DIAGNOSIS — H90.3 SENSORINEURAL HEARING LOSS, BILATERAL: Primary | ICD-10-CM

## 2021-12-17 DIAGNOSIS — R42 DIZZINESS: Primary | ICD-10-CM

## 2021-12-17 DIAGNOSIS — H69.83 DYSFUNCTION OF BOTH EUSTACHIAN TUBES: ICD-10-CM

## 2021-12-17 DIAGNOSIS — H92.02 LEFT EAR PAIN: ICD-10-CM

## 2021-12-17 PROCEDURE — 99213 OFFICE O/P EST LOW 20 MIN: CPT | Performed by: OTOLARYNGOLOGY

## 2021-12-17 PROCEDURE — 3008F BODY MASS INDEX DOCD: CPT | Performed by: OTOLARYNGOLOGY

## 2021-12-17 PROCEDURE — 92567 TYMPANOMETRY: CPT | Performed by: AUDIOLOGIST

## 2021-12-17 PROCEDURE — 92557 COMPREHENSIVE HEARING TEST: CPT | Performed by: AUDIOLOGIST

## 2021-12-17 RX ORDER — AZELASTINE 1 MG/ML
2 SPRAY, METERED NASAL 2 TIMES DAILY
Qty: 30 ML | Refills: 0 | Status: SHIPPED | OUTPATIENT
Start: 2021-12-17

## 2021-12-17 RX ORDER — LORATADINE 10 MG/1
10 TABLET ORAL DAILY
Qty: 30 TABLET | Refills: 3 | Status: SHIPPED | OUTPATIENT
Start: 2021-12-17

## 2021-12-17 RX ORDER — MONTELUKAST SODIUM 10 MG/1
10 TABLET ORAL NIGHTLY
Qty: 30 TABLET | Refills: 3 | Status: SHIPPED | OUTPATIENT
Start: 2021-12-17

## 2021-12-17 RX ORDER — CYCLOBENZAPRINE HCL 5 MG
5 TABLET ORAL NIGHTLY
Qty: 30 TABLET | Refills: 1 | Status: SHIPPED | OUTPATIENT
Start: 2021-12-17

## 2021-12-17 RX ORDER — MELOXICAM 15 MG/1
15 TABLET ORAL DAILY
Qty: 30 TABLET | Refills: 3 | Status: SHIPPED | OUTPATIENT
Start: 2021-12-17

## 2021-12-17 NOTE — PROGRESS NOTES
Payton Man is a 57year old female.   Patient presents with:  Ear Problem: Patient left ear recurrent infection, per pt headaches, dizziness ear pain, completed a course of antibiotics with no improvement       HISTORY OF PRESENT ILLNESS  She presents moderate intake, occasionally      Drug use: No    Other Topics      Concerns:        Caffeine Concern: Yes          coffee, > 6 cups daily        Exercise: Yes          walking        Pt has a pacemaker: No        Pt has a defibrillator: No        Reactio Erica Eckert MD;  Location: [facility] ENDOSCOPY   • COLONOSCOPY & POLYPECTOMY  2011    colonoscopy with polypectomy, repat in 5 yr; colonic polyps, tubular adenoma   • OTHER Right     MIDDLE TRIGGER FINGER RELEASE   • OTHER Right     ELBOW ULNAR RELEASE   • Detail Normal Submental. Submandibular. Anterior cervical. Posterior cervical. Supraclavicular.    TMJ   tender to palpation on the left   Nose/Mouth/Throat Normal External nose - Normal. Lips/teeth/gums - Normal. Tonsils - Normal. Oropharynx - Normal.   No recognition dictation software. As a result errors may occur. When identified these errors have been corrected. While every attempt is made to correct errors during dictation discrepancies may still exist    Michael Skelton MD    12/17/2021    10:22 AM

## 2021-12-21 NOTE — TELEPHONE ENCOUNTER
From: Barbara Pill  To: Muniz Primrose. Zander Galeas MD  Sent: 12/17/2021 7:44 PM CST  Subject: 4 new prescriptions    Dr. Hurst, Can you clarify what times I should be taking the Meloxicam & Loratadine?  I know Cyclobenzaprine is a bedtime and the Azelastine twice

## 2022-01-18 ENCOUNTER — OFFICE VISIT (OUTPATIENT)
Dept: OTOLARYNGOLOGY | Facility: CLINIC | Age: 62
End: 2022-01-18
Payer: COMMERCIAL

## 2022-01-18 VITALS — BODY MASS INDEX: 29.88 KG/M2 | TEMPERATURE: 98 F | WEIGHT: 175 LBS | HEIGHT: 64 IN

## 2022-01-18 DIAGNOSIS — H69.83 DYSFUNCTION OF BOTH EUSTACHIAN TUBES: Primary | ICD-10-CM

## 2022-01-18 DIAGNOSIS — H92.02 LEFT EAR PAIN: ICD-10-CM

## 2022-01-18 PROCEDURE — 99213 OFFICE O/P EST LOW 20 MIN: CPT | Performed by: OTOLARYNGOLOGY

## 2022-01-18 PROCEDURE — 3008F BODY MASS INDEX DOCD: CPT | Performed by: OTOLARYNGOLOGY

## 2022-01-18 PROCEDURE — 69420 INCISION OF EARDRUM: CPT | Performed by: OTOLARYNGOLOGY

## 2022-01-18 NOTE — PROGRESS NOTES
Shelli Dumas is a 57year old female. Patient presents with:   Follow - Up: pt here for follow up from jaw pain and ear pain, per pt jaw pain has improved and but the ear still feels clogged since last visit the antibiotics didn't help   Nose Problem: resolution of her ear pain with use of the anti-inflammatory muscle relaxant but still continues to have ear pressure and the need to pop her ear on occasion despite the allergy medications.   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 Appropriate mood and affect.    Neck Exam Normal Inspection - Normal. Palpation - Normal. Parotid gland - Normal. Thyroid gland - Normal.   Eyes Normal Conjunctiva - Right: Normal, Left: Normal. Pupil - Right: Normal, Left: Normal. Fundus - Right Nasal Solution, 2 sprays by Nasal route 2 (two) times daily. , Disp: 30 mL, Rfl: 0  •  spironolactone 50 MG Oral Tab, Take 1 tablet (50 mg total) by mouth daily. , Disp: 90 tablet, Rfl: 1  •  Montelukast Sodium 10 MG Oral Tab, Take 1 tablet (10 mg total) by

## 2022-02-19 NOTE — TELEPHONE ENCOUNTER
This refill request is being sent to the provider for the following reason:  []Patient has not had an appointment within the past 12 months but has made an appointment on: ___  [x]Medication is not within protocol  []Patient did not complete follow up recommendations  []Other: ___

## 2022-02-20 RX ORDER — CYCLOBENZAPRINE HCL 5 MG
TABLET ORAL
Qty: 30 TABLET | Refills: 1 | Status: SHIPPED | OUTPATIENT
Start: 2022-02-20

## 2022-02-25 NOTE — PROCEDURES
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 This is often associated with myopic degeneration but can also be found in nonmyopic patients. Approximately 1% of patients with lattice degeneration will progress to symptomatic retinal detachment at some point. Most patients with lattice degeneration with or without atrophic holes can be observed without treatment. Indications for treatment include retinal detachment in the fellow eye, strong family history of retinal detachment, sudden onset of photopsias, open breaks with subretinal fluid, or extensive vitreal retinal traction. Most patients do not require intervention.  All of the findings were discussed in detail including the possibility of progressive retinal thinning/detachment, dilated pupil, photopsia, difficulty focusing and permanent vision loss with the patient. Retinal detachment warning signs were discussed. Written informed consent obtained. Aseptic technique. Local 1% lidocaine with epinephrine. Lesion shave with Dermablade. The base cauterized with silver nitrate. Sterile dressing. Procedure tolerated well. Wound instructions given.

## 2022-03-08 RX ORDER — SPIRONOLACTONE 50 MG/1
50 TABLET, FILM COATED ORAL DAILY
Qty: 90 TABLET | Refills: 1 | Status: SHIPPED | OUTPATIENT
Start: 2022-03-08

## 2022-03-08 NOTE — TELEPHONE ENCOUNTER
With POP UP VERY HIGH WARNING due to lisinopril. DR Leela Nuñez (on behalf of Dr Washington)=pended for approval,macario. Refill passed per [place] [employer] [place], [facility] protocol.      Requested Prescriptions   Pending Prescriptions Disp Refills    SPIRONOLACTONE 50 MG Oral Tab [Pharmacy Med Name: SPIRONOLACTONE 50MG TABLETS] 90 tablet 1     Sig: TAKE 1 TABLET(50 MG) BY MOUTH DAILY        Hypertensive Medications Protocol Passed - 3/7/2022  9:47 PM        Passed - CMP or BMP in past 12 months        Passed - Appointment in past 6 or next 3 months        Passed - GFR Non- > 50     Lab Results   Component Value Date    GFRNAA 99 05/27/2021                             Recent Outpatient Visits              1 month ago Dysfunction of both eustachian tubes    [facility], [address], Malika Uribe MD    Office Visit    2 months ago Sensorineural hearing loss, bilateral    [facility], Au.D    Office Visit    2 months ago [address] [place] [address], [address], Malika Uribe MD    Office Visit    2 months ago Acute ear pain, left    [facility], [facility], [address] [place], DAVE    Office Visit    3 months ago Acute left otitis media    [facility], Johnny Hartman APRN    Office Visit ,[email or web address]

## 2022-04-19 ENCOUNTER — NURSE TRIAGE (OUTPATIENT)
Dept: FAMILY MEDICINE CLINIC | Facility: CLINIC | Age: 62
End: 2022-04-19

## 2022-04-19 NOTE — TELEPHONE ENCOUNTER
Called patient, confirmed name and . Informed of below. Patient verbalized understanding and agrees.

## 2022-04-19 NOTE — TELEPHONE ENCOUNTER
Actually recommend Pepto-Bismol 2 tablets 4 times a day as needed. Agree with dietary recommendations. Recommend stool testing if not improved in 2 days.

## 2022-04-19 NOTE — TELEPHONE ENCOUNTER
----- Message from Pina Dee RN sent at 4/19/2022  9:58 AM CDT -----  Regarding: FW: On-going persistent episodes of diarrhea      ----- Message -----  From: Shelli Dumas  Sent: 4/19/2022   9:24 AM CDT  To: Tracee Vora Triage  Subject: On-going persistent episodes of diarrhea         Since 4/15 I have had on-going episodes of diarrhea(often explosive) after eating and though out the day. Also, some bloating, burping, stomach noise & cramps. In the past I have had issues w/IBS. I'm wondering if I need a zoom/office visit or if you may have suggestions for dietary restrictions or over the counter medications. I am going out of town on 4/22 so really hoping to have this issue resolved. Thank you.  Lu Menjivar

## 2022-04-20 RX ORDER — CYCLOBENZAPRINE HCL 5 MG
TABLET ORAL
Qty: 30 TABLET | Refills: 2 | Status: SHIPPED | OUTPATIENT
Start: 2022-04-20

## 2022-05-03 ENCOUNTER — OFFICE VISIT (OUTPATIENT)
Dept: ORTHOPEDICS CLINIC | Facility: CLINIC | Age: 62
End: 2022-05-03
Payer: COMMERCIAL

## 2022-05-03 ENCOUNTER — HOSPITAL ENCOUNTER (OUTPATIENT)
Dept: GENERAL RADIOLOGY | Facility: HOSPITAL | Age: 62
Discharge: HOME | End: 2022-05-03
Attending: ORTHOPAEDIC SURGERY
Payer: COMMERCIAL

## 2022-05-03 DIAGNOSIS — M25.562 LEFT KNEE PAIN, UNSPECIFIED CHRONICITY: Primary | ICD-10-CM

## 2022-05-03 DIAGNOSIS — M25.562 LEFT KNEE PAIN, UNSPECIFIED CHRONICITY: ICD-10-CM

## 2022-05-03 DIAGNOSIS — M17.12 PRIMARY OSTEOARTHRITIS OF LEFT KNEE: ICD-10-CM

## 2022-05-03 PROCEDURE — 99214 OFFICE O/P EST MOD 30 MIN: CPT | Performed by: ORTHOPAEDIC SURGERY

## 2022-05-03 PROCEDURE — 20610 DRAIN/INJ JOINT/BURSA W/O US: CPT | Performed by: ORTHOPAEDIC SURGERY

## 2022-05-03 PROCEDURE — 73564 X-RAY EXAM KNEE 4 OR MORE: CPT | Performed by: ORTHOPAEDIC SURGERY

## 2022-05-03 RX ORDER — TRIAMCINOLONE ACETONIDE 40 MG/ML
40 INJECTION, SUSPENSION INTRA-ARTICULAR; INTRAMUSCULAR ONCE
Status: COMPLETED | OUTPATIENT
Start: 2022-05-03 | End: 2022-05-03

## 2022-05-03 NOTE — PROCEDURES
The patient identified the left knee(s) as the correct procedure site. This was verified with the consent and with 2 patient identifiers. The knee injection site was prepped with betadine and alcohol. A 22-gauge needle was introduced into the knee. The knee was injected with 40 mg of Kenalog and 4 mL of 1% lidocaine. The patient tolerated the procedure well. A soft dressing was applied.

## 2022-05-03 NOTE — PROGRESS NOTES
Per verbal order from Dr. Vern Kenyon, draw up 4ml of 1% lidocaine and 1ml of Kenalog 40 for cortisone injection to the left knee.

## 2022-05-06 ENCOUNTER — HOSPITAL ENCOUNTER (OUTPATIENT)
Age: 62
Discharge: HOME | End: 2022-05-06
Payer: COMMERCIAL

## 2022-05-06 VITALS
TEMPERATURE: 98 F | BODY MASS INDEX: 32.57 KG/M2 | RESPIRATION RATE: 18 BRPM | HEART RATE: 98 BPM | DIASTOLIC BLOOD PRESSURE: 72 MMHG | OXYGEN SATURATION: 99 % | WEIGHT: 177 LBS | HEIGHT: 62 IN | SYSTOLIC BLOOD PRESSURE: 130 MMHG

## 2022-05-06 DIAGNOSIS — Z20.822 ENCOUNTER FOR LABORATORY TESTING FOR COVID-19 VIRUS: ICD-10-CM

## 2022-05-06 DIAGNOSIS — U07.1 COVID-19 VIRUS INFECTION: Primary | ICD-10-CM

## 2022-05-06 LAB
S PYO AG THROAT QL: NEGATIVE
SARS-COV-2 RNA RESP QL NAA+PROBE: DETECTED

## 2022-05-06 RX ORDER — BEBTELOVIMAB 87.5 MG/ML
175 INJECTION, SOLUTION INTRAVENOUS ONCE
Status: COMPLETED | OUTPATIENT
Start: 2022-05-06 | End: 2022-05-06

## 2022-05-06 NOTE — ED INITIAL ASSESSMENT (HPI)
Pt here for covid testing. Pt had covid booster on 5/4/22. Found out yesterday that was exposed to someone at work and pt woke up this morning with fever 100.4, sore throat and headache. Pt took 2 covid test today at home and were positive. Per pt \"Dr. Demi Flannery sent me here for the antibody infusion. \"

## 2022-05-09 ENCOUNTER — TELEPHONE (OUTPATIENT)
Dept: CASE MANAGEMENT | Age: 62
End: 2022-05-09

## 2022-05-09 ENCOUNTER — TELEMEDICINE (OUTPATIENT)
Dept: FAMILY MEDICINE CLINIC | Facility: CLINIC | Age: 62
End: 2022-05-09

## 2022-05-09 DIAGNOSIS — J45.20 MILD INTERMITTENT ASTHMA WITHOUT COMPLICATION: ICD-10-CM

## 2022-05-09 DIAGNOSIS — U07.1 COVID-19: Primary | ICD-10-CM

## 2022-05-09 DIAGNOSIS — H92.09 OTALGIA, UNSPECIFIED LATERALITY: ICD-10-CM

## 2022-05-09 PROCEDURE — 99214 OFFICE O/P EST MOD 30 MIN: CPT | Performed by: FAMILY MEDICINE

## 2022-05-09 RX ORDER — ALBUTEROL SULFATE 90 UG/1
1 AEROSOL, METERED RESPIRATORY (INHALATION) EVERY 6 HOURS PRN
Qty: 18 G | Refills: 0 | Status: SHIPPED | OUTPATIENT
Start: 2022-05-09

## 2022-05-09 NOTE — TELEPHONE ENCOUNTER
Pt received MAB infusion at [facility] on 5/6/22 for COVID-19. Please follow-up with pt for post-infusion assessment and home monitoring if needed. Thank you.

## 2022-05-09 NOTE — TELEPHONE ENCOUNTER
Home Monitoring Day 3 of 7. Patient declines adverse reaction symptoms of MAB infusion. What  was your temp today? - 100.8F    How did you take your temp?     with an oral thermometer      What was your pulse ox today? 97-98%  RN educated patient about pulse oximeter, how to use it, numbers in normal range, and what to do if abnormal range. Are you feeling short of breath today? No    Is the shortness of breath better, the same, or worse than yesterday? better  Patient speaking with full sentences. Advised patient to take 10 deep breaths every hour while awake to prevent atelectasis and pneumonia. Are you having a cough today? Yes    Is the cough better, the same, or worse than yesterday? better  Denies chest pain. Nasal Congestion or Runny Nose? Yes     Are you experiencing weakness today? Yes    Is the weakness better, the same or worse than yesterday?     worse    How is your appetite compared to yesterday?     about the same    Are you vomiting? No    Diarrhea? No       Any loss of taste or smell? Yes: coffee tastes worse. changes in taste      She reports fatigue. And all other symptoms seem to improved. New symptom: Left ear pain. Has history of Left Ear drum issues. Has seen ENT. Encouraged to stay hydrated and take Tylenol as directed for fever > 100F    RN advised patient to call us back if questions about symptoms, however if symptoms get severely worse or if patient experiences shortness of breath, chest pain, severe pain, persistent low oxygen saturation readings, patient should go to ER. Patient verbalizes understanding of when to call our office and when to go to ER. Patient verbalizes understanding of when to call our office and when to go to ER. Patient verbalizes understanding that we have On-Call provider available after-hours, [facility], Urgent Cares and Emergency Rooms. Advised sge will be called daily, except Sunday.   Scheduled on Call Back for tomorrow. Appointment made today for Left ear Pain - history of ear infections, left ear.        Future Appointments   Date Time Provider Tracey Ybarra   5/9/2022  5:30 PM Delos Habermann, [facility]

## 2022-05-09 NOTE — TELEPHONE ENCOUNTER
RN called patient. Patient's date of birth and full name both confirmed. RN informed patient that home monitoring is for 3 days, RN had previously told her it was 7 days. RN encouraged patient to call us if questions about symptoms. Also if severe or worsening symptoms, go to ER/IC. She verbalizes understanding of all information, and agreeable to plan.        Future Appointments   Date Time Provider Tracey Ybarra   5/9/2022  5:30 PM Rochelle Cortez MD [facility]

## 2022-05-12 ENCOUNTER — TELEPHONE (OUTPATIENT)
Dept: FAMILY MEDICINE CLINIC | Facility: CLINIC | Age: 62
End: 2022-05-12

## 2022-05-12 RX ORDER — CODEINE PHOSPHATE AND GUAIFENESIN 10; 100 MG/5ML; MG/5ML
5 SOLUTION ORAL EVERY 6 HOURS PRN
Qty: 180 ML | Refills: 0 | Status: SHIPPED | OUTPATIENT
Start: 2022-05-12

## 2022-05-12 NOTE — TELEPHONE ENCOUNTER
Please let patient know I have sent Rx for codeine continue cough medicine to the pharmacy. May cause constipation, sedation. But should help more with chest pain and cough. Please schedule for follow-up with me within the next 5 days, okay to use res24.

## 2022-05-12 NOTE — TELEPHONE ENCOUNTER
Patient calling with update regarding + Covid from 5/6 (had monoclonal IV). States she's still felling about the same, very congested (taking Dayquil and Nightquil), \"burning in chest,\" and wheezing. State inhaler was not sent to pharmacy. I just called pharmacy and they are getting inhaler ready. Patient wanting to inform Dr. Judith Vázquez and asking for any further advice.

## 2022-05-18 RX ORDER — ATORVASTATIN CALCIUM 40 MG/1
40 TABLET, FILM COATED ORAL NIGHTLY
Qty: 90 TABLET | Refills: 1 | Status: SHIPPED | OUTPATIENT
Start: 2022-05-18

## 2022-05-19 ENCOUNTER — TELEPHONE (OUTPATIENT)
Dept: FAMILY MEDICINE CLINIC | Facility: CLINIC | Age: 62
End: 2022-05-19

## 2022-05-19 RX ORDER — PREDNISONE 20 MG/1
40 TABLET ORAL DAILY
Qty: 10 TABLET | Refills: 0 | Status: SHIPPED | OUTPATIENT
Start: 2022-05-19 | End: 2022-05-24

## 2022-05-19 NOTE — TELEPHONE ENCOUNTER
----- Message from Carrington Lutz RN sent at 5/19/2022  1:26 PM CDT -----  Regarding: FW: Wheezing/coughing maybe need prednisone       ----- Message -----  From: Paul Jin  Sent: 5/19/2022  10:45 AM CDT  To: Em Rn Triage  Subject: Wheezing/coughing maybe need prednisone          Dr. Ana Saha, I came to office thinking I had made an appointment this am but I guess I never hit confirm. I'm wondering if I could get prednisone for chest congestion/wheezing. Since having COVID on 5/6, I have my usual issue with the upper respiratory & prednisone usually clears it up. I have albuterol inhaler and use that in am . My next appointment with you is on 6/16. Thank you!  Chiquita Blackburn

## 2022-05-31 ENCOUNTER — OFFICE VISIT (OUTPATIENT)
Dept: OTOLARYNGOLOGY | Facility: CLINIC | Age: 62
End: 2022-05-31
Payer: COMMERCIAL

## 2022-05-31 VITALS — TEMPERATURE: 98 F

## 2022-05-31 DIAGNOSIS — H92.02 LEFT EAR PAIN: Primary | ICD-10-CM

## 2022-05-31 PROCEDURE — 99213 OFFICE O/P EST LOW 20 MIN: CPT | Performed by: OTOLARYNGOLOGY

## 2022-05-31 RX ORDER — CYCLOBENZAPRINE HCL 5 MG
5 TABLET ORAL NIGHTLY
Qty: 30 TABLET | Refills: 1 | Status: SHIPPED | OUTPATIENT
Start: 2022-05-31

## 2022-05-31 RX ORDER — MELOXICAM 15 MG/1
15 TABLET ORAL DAILY
Qty: 30 TABLET | Refills: 3 | Status: SHIPPED | OUTPATIENT
Start: 2022-05-31

## 2022-06-14 RX ORDER — ALBUTEROL SULFATE 90 UG/1
1 AEROSOL, METERED RESPIRATORY (INHALATION) EVERY 6 HOURS PRN
Qty: 18 G | Refills: 0 | Status: SHIPPED | OUTPATIENT
Start: 2022-06-14

## 2022-06-20 ENCOUNTER — NURSE TRIAGE (OUTPATIENT)
Dept: FAMILY MEDICINE CLINIC | Facility: CLINIC | Age: 62
End: 2022-06-20

## 2022-06-21 ENCOUNTER — OFFICE VISIT (OUTPATIENT)
Dept: FAMILY MEDICINE CLINIC | Facility: CLINIC | Age: 62
End: 2022-06-21
Payer: COMMERCIAL

## 2022-06-21 VITALS
SYSTOLIC BLOOD PRESSURE: 127 MMHG | BODY MASS INDEX: 33.24 KG/M2 | DIASTOLIC BLOOD PRESSURE: 74 MMHG | HEIGHT: 62 IN | HEART RATE: 103 BPM | WEIGHT: 180.63 LBS

## 2022-06-21 DIAGNOSIS — J30.2 SEASONAL ALLERGIC RHINITIS, UNSPECIFIED TRIGGER: Primary | ICD-10-CM

## 2022-06-21 PROCEDURE — 3008F BODY MASS INDEX DOCD: CPT

## 2022-06-21 PROCEDURE — 3074F SYST BP LT 130 MM HG: CPT

## 2022-06-21 PROCEDURE — 3078F DIAST BP <80 MM HG: CPT

## 2022-06-21 PROCEDURE — 99213 OFFICE O/P EST LOW 20 MIN: CPT

## 2022-06-21 RX ORDER — FLUTICASONE PROPIONATE 50 MCG
2 SPRAY, SUSPENSION (ML) NASAL DAILY
Qty: 16 G | Refills: 0 | Status: SHIPPED | OUTPATIENT
Start: 2022-06-21 | End: 2023-06-16

## 2022-06-29 ENCOUNTER — TELEPHONE (OUTPATIENT)
Dept: FAMILY MEDICINE CLINIC | Facility: CLINIC | Age: 62
End: 2022-06-29

## 2022-06-29 DIAGNOSIS — Z12.31 SCREENING MAMMOGRAM, ENCOUNTER FOR: Primary | ICD-10-CM

## 2022-06-29 DIAGNOSIS — Z12.11 COLON CANCER SCREENING: ICD-10-CM

## 2022-06-29 DIAGNOSIS — Z86.010 PERSONAL HISTORY OF COLONIC POLYPS: ICD-10-CM

## 2022-06-29 NOTE — TELEPHONE ENCOUNTER
Last screening mammogram 6/8/21    Ordered screening mammogram per protocol. Pended Gastro referral for colonoscopy.   Please advise

## 2022-06-29 NOTE — TELEPHONE ENCOUNTER
Patient is requesting a mammogram and colonoscopy order. Please advise when confirmed for scheduling.

## 2022-07-09 ENCOUNTER — PATIENT MESSAGE (OUTPATIENT)
Dept: FAMILY MEDICINE CLINIC | Facility: CLINIC | Age: 62
End: 2022-07-09

## 2022-07-10 NOTE — TELEPHONE ENCOUNTER
[employer] message sent to pt.        Future Appointments   Date Time Provider Tracey Ybarra   9/8/2022  9:00 AM Vero Connell  [address] [place] [address]

## 2022-07-10 NOTE — TELEPHONE ENCOUNTER
From: Dario Dasilva  To: Nae Salas MD  Sent: 7/9/2022 7:00 PM CDT  Subject: Left knee pain    Dr. Samira Salas,  I hurt my left knee a week ago (7/2) and it continues to get worse. I was jumping in a swimming pool with my grandkids and felt a sharp pain in my left knee. I can hardly go up & down stairs. Can I get a referral for Dr. Chirag Krishnan? I saw Dr. Nicole Ramos on 5/3 for left knee pain and got an injection. I think I did something while jumping. Thank you!   Kaela Winchester

## 2022-07-11 ENCOUNTER — OFFICE VISIT (OUTPATIENT)
Dept: FAMILY MEDICINE CLINIC | Facility: CLINIC | Age: 62
End: 2022-07-11
Payer: COMMERCIAL

## 2022-07-11 ENCOUNTER — NURSE TRIAGE (OUTPATIENT)
Dept: FAMILY MEDICINE CLINIC | Facility: CLINIC | Age: 62
End: 2022-07-11

## 2022-07-11 ENCOUNTER — HOSPITAL ENCOUNTER (OUTPATIENT)
Dept: GENERAL RADIOLOGY | Facility: HOSPITAL | Age: 62
Discharge: HOME | End: 2022-07-11
Payer: COMMERCIAL

## 2022-07-11 VITALS
DIASTOLIC BLOOD PRESSURE: 76 MMHG | BODY MASS INDEX: 32.87 KG/M2 | RESPIRATION RATE: 18 BRPM | SYSTOLIC BLOOD PRESSURE: 112 MMHG | HEART RATE: 96 BPM | TEMPERATURE: 96 F | HEIGHT: 62 IN | WEIGHT: 178.63 LBS

## 2022-07-11 DIAGNOSIS — M17.12 OSTEOARTHRITIS OF LEFT KNEE, UNSPECIFIED OSTEOARTHRITIS TYPE: ICD-10-CM

## 2022-07-11 DIAGNOSIS — M25.562 ACUTE PAIN OF LEFT KNEE: ICD-10-CM

## 2022-07-11 DIAGNOSIS — M25.562 ACUTE PAIN OF LEFT KNEE: Primary | ICD-10-CM

## 2022-07-11 PROBLEM — G89.29 CHRONIC PAIN OF LEFT KNEE: Status: ACTIVE | Noted: 2022-07-11

## 2022-07-11 PROCEDURE — 73562 X-RAY EXAM OF KNEE 3: CPT

## 2022-07-11 RX ORDER — LORATADINE 10 MG/1
10 TABLET ORAL DAILY
COMMUNITY

## 2022-07-11 RX ORDER — ALBUTEROL SULFATE 90 UG/1
AEROSOL, METERED RESPIRATORY (INHALATION)
Qty: 8.5 G | Refills: 3 | Status: SHIPPED | OUTPATIENT
Start: 2022-07-11 | End: 2023-03-17 | Stop reason: ALTCHOICE

## 2022-07-11 RX ORDER — NAPROXEN 500 MG/1
500 TABLET ORAL 2 TIMES DAILY WITH MEALS
Qty: 28 TABLET | Refills: 0 | Status: SHIPPED | OUTPATIENT
Start: 2022-07-11 | End: 2022-07-25

## 2022-07-11 NOTE — PATIENT INSTRUCTIONS
Can take Tramadol as needed nightly for pain  Purchase knee sleeve over the counter, wear throughout day and remove at night

## 2022-07-12 ENCOUNTER — PATIENT MESSAGE (OUTPATIENT)
Dept: FAMILY MEDICINE CLINIC | Facility: CLINIC | Age: 62
End: 2022-07-12

## 2022-07-12 DIAGNOSIS — M25.562 ACUTE PAIN OF LEFT KNEE: Primary | ICD-10-CM

## 2022-07-12 NOTE — TELEPHONE ENCOUNTER
From: Gi Curiel  To: Nae Vázquez MD  Sent: 7/12/2022 12:18 PM CDT  Subject: Dr. Delfina Guzman not at office anymore    Dr. Judith Vázquez,  I called to schedule an appointment and they said Doctor Delfina Guzman isn't there anymore. I was going due to left knee pain after jumping in pool w/grandkids. This is a different pain than arthritis. Is there another ortho to see for follow up? Thank you!  Nevaeh Castro Problem: Physical Therapy Goal  Goal: Physical Therapy Goal  Goals to be met by: 18     Patient will increase functional independence with mobility by performin. Supine to sit with Supervision  2. Sit to supine with Supervision  3. Sit to stand transfer with Stand-by Assistance  4. Gait  x 150 feet with Stand-by Assistance using Rolling Walker.   5. Lower extremity exercise program x 30 reps per handout, with independence    Outcome: Ongoing (interventions implemented as appropriate)  PT eval complete and POC initiated.

## 2022-07-13 ENCOUNTER — PATIENT MESSAGE (OUTPATIENT)
Dept: FAMILY MEDICINE CLINIC | Facility: CLINIC | Age: 62
End: 2022-07-13

## 2022-07-18 RX ORDER — LISINOPRIL 20 MG/1
TABLET ORAL
Qty: 90 TABLET | Refills: 3 | Status: SHIPPED | OUTPATIENT
Start: 2022-07-18

## 2022-07-18 RX ORDER — LISINOPRIL 20 MG/1
TABLET ORAL
Qty: 90 TABLET | Refills: 3 | OUTPATIENT
Start: 2022-07-18

## 2022-07-20 RX ORDER — CYCLOBENZAPRINE HCL 5 MG
5 TABLET ORAL NIGHTLY
Qty: 30 TABLET | Refills: 2 | Status: SHIPPED | OUTPATIENT
Start: 2022-07-20

## 2022-07-27 ENCOUNTER — HOSPITAL ENCOUNTER (OUTPATIENT)
Dept: MAMMOGRAPHY | Facility: HOSPITAL | Age: 62
Discharge: HOME | End: 2022-07-27
Attending: FAMILY MEDICINE
Payer: COMMERCIAL

## 2022-07-27 DIAGNOSIS — Z12.31 SCREENING MAMMOGRAM, ENCOUNTER FOR: ICD-10-CM

## 2022-07-27 PROCEDURE — 77063 BREAST TOMOSYNTHESIS BI: CPT | Performed by: FAMILY MEDICINE

## 2022-07-27 PROCEDURE — 77067 SCR MAMMO BI INCL CAD: CPT | Performed by: FAMILY MEDICINE

## 2022-08-15 ENCOUNTER — OFFICE VISIT (OUTPATIENT)
Dept: ORTHOPEDICS CLINIC | Facility: CLINIC | Age: 62
End: 2022-08-15
Payer: COMMERCIAL

## 2022-08-15 VITALS — SYSTOLIC BLOOD PRESSURE: 121 MMHG | DIASTOLIC BLOOD PRESSURE: 74 MMHG | HEART RATE: 98 BPM

## 2022-08-15 DIAGNOSIS — M17.12 PRIMARY OSTEOARTHRITIS OF LEFT KNEE: Primary | ICD-10-CM

## 2022-08-15 DIAGNOSIS — E66.09 CLASS 1 OBESITY DUE TO EXCESS CALORIES WITH SERIOUS COMORBIDITY AND BODY MASS INDEX (BMI) OF 32.0 TO 32.9 IN ADULT: ICD-10-CM

## 2022-08-15 DIAGNOSIS — M25.562 CHRONIC PAIN OF LEFT KNEE: ICD-10-CM

## 2022-08-15 DIAGNOSIS — G89.29 CHRONIC PAIN OF LEFT KNEE: ICD-10-CM

## 2022-08-15 DIAGNOSIS — M23.301 DEGENERATIVE TEAR OF LATERAL MENISCUS OF LEFT KNEE: ICD-10-CM

## 2022-08-15 RX ORDER — TRIAMCINOLONE ACETONIDE 40 MG/ML
40 INJECTION, SUSPENSION INTRA-ARTICULAR; INTRAMUSCULAR ONCE
Status: COMPLETED | OUTPATIENT
Start: 2022-08-15 | End: 2022-08-15

## 2022-08-15 RX ADMIN — TRIAMCINOLONE ACETONIDE 40 MG: 40 INJECTION, SUSPENSION INTRA-ARTICULAR; INTRAMUSCULAR at 16:52:00

## 2022-08-19 RX ORDER — MONTELUKAST SODIUM 10 MG/1
TABLET ORAL
Qty: 90 TABLET | Refills: 3 | Status: SHIPPED | OUTPATIENT
Start: 2022-08-19

## 2022-09-01 ENCOUNTER — TELEPHONE (OUTPATIENT)
Dept: GASTROENTEROLOGY | Facility: CLINIC | Age: 62
End: 2022-09-01

## 2022-09-01 NOTE — TELEPHONE ENCOUNTER
----- Message from Scarlett Viera RN sent at 11/11/2019  3:26 PM CST -----  Regarding: 3 yr CLN recall  Entered into Epic:Recall colon in 3 years per /PB. Last Colon done 11/5/2019, next due 11/5/22. HM updated. Letter mailed.

## 2022-09-02 ENCOUNTER — TELEPHONE (OUTPATIENT)
Dept: GASTROENTEROLOGY | Facility: CLINIC | Age: 62
End: 2022-09-02

## 2022-09-02 DIAGNOSIS — Z86.010 HISTORY OF ADENOMATOUS POLYP OF COLON: Primary | ICD-10-CM

## 2022-09-02 NOTE — TELEPHONE ENCOUNTER
Dr. Liudmila Diaz--    Patient is due for colonoscopy 3 year recall. Attached previous operative and pathology report below for review. Reconciled medications, preferred pharmacy and allergies. No current GI related symptoms. Last Procedure, Date, MD: Colonoscopy 11/5/2019     Last Diagnosis: Sessile polyps; Small internal hemorrhoids     Recalled for (mth/yrs): 3 years    Sedation used previously: MAC     Last Prep Used (if known): Golytely   Quality of prep (if known): Good    Anticoagulants: No   Diabetic Meds: No   BP meds(Ace inhibitors/ARB's): Lisinopril 20 mg/daily   Weight loss meds (phentermine/vyvanse): No   Iron supplement (RX/OTC): No   Height & Weight/BMI: 5'2\"; 175 lb; BMI: 32.0   Hx of Cardiac/CVA issues/(MI/Stroke): No   Devices Pacemaker/Defibrillator/Stents: No   Resp. Issues/Oxygen Use/SALIMA/COPD: No   Issues w/Anesthesia: No     Special comments/notes: None     Please advise on orders and prep.      Thank you

## 2022-09-04 RX ORDER — POLYETHYLENE GLYCOL 3350, SODIUM SULFATE ANHYDROUS, SODIUM BICARBONATE, SODIUM CHLORIDE, POTASSIUM CHLORIDE 236; 22.74; 6.74; 5.86; 2.97 G/4L; G/4L; G/4L; G/4L; G/4L
4 POWDER, FOR SOLUTION ORAL ONCE
Qty: 1 EACH | Refills: 0 | Status: SHIPPED | OUTPATIENT
Start: 2022-09-04 | End: 2022-09-04

## 2022-09-08 ENCOUNTER — LAB ENCOUNTER (OUTPATIENT)
Dept: LAB | Age: 62
End: 2022-09-08
Attending: FAMILY MEDICINE
Payer: COMMERCIAL

## 2022-09-08 ENCOUNTER — OFFICE VISIT (OUTPATIENT)
Dept: FAMILY MEDICINE CLINIC | Facility: CLINIC | Age: 62
End: 2022-09-08
Payer: COMMERCIAL

## 2022-09-08 VITALS
WEIGHT: 173 LBS | HEIGHT: 64 IN | RESPIRATION RATE: 18 BRPM | SYSTOLIC BLOOD PRESSURE: 135 MMHG | DIASTOLIC BLOOD PRESSURE: 85 MMHG | BODY MASS INDEX: 29.53 KG/M2 | OXYGEN SATURATION: 100 % | HEART RATE: 102 BPM

## 2022-09-08 DIAGNOSIS — I10 ESSENTIAL HYPERTENSION: ICD-10-CM

## 2022-09-08 DIAGNOSIS — Z00.00 ROUTINE PHYSICAL EXAMINATION: Primary | ICD-10-CM

## 2022-09-08 DIAGNOSIS — J45.20 MILD INTERMITTENT ASTHMA WITHOUT COMPLICATION: ICD-10-CM

## 2022-09-08 DIAGNOSIS — M25.512 CHRONIC PAIN OF BOTH SHOULDERS: ICD-10-CM

## 2022-09-08 DIAGNOSIS — G89.29 CHRONIC PAIN OF BOTH SHOULDERS: ICD-10-CM

## 2022-09-08 DIAGNOSIS — G62.9 POLYNEUROPATHY: ICD-10-CM

## 2022-09-08 DIAGNOSIS — M25.511 CHRONIC PAIN OF BOTH SHOULDERS: ICD-10-CM

## 2022-09-08 PROCEDURE — 99396 PREV VISIT EST AGE 40-64: CPT | Performed by: FAMILY MEDICINE

## 2022-09-08 PROCEDURE — 3079F DIAST BP 80-89 MM HG: CPT | Performed by: FAMILY MEDICINE

## 2022-09-08 PROCEDURE — 90471 IMMUNIZATION ADMIN: CPT | Performed by: FAMILY MEDICINE

## 2022-09-08 PROCEDURE — 3075F SYST BP GE 130 - 139MM HG: CPT | Performed by: FAMILY MEDICINE

## 2022-09-08 PROCEDURE — 90677 PCV20 VACCINE IM: CPT | Performed by: FAMILY MEDICINE

## 2022-09-08 PROCEDURE — 3008F BODY MASS INDEX DOCD: CPT | Performed by: FAMILY MEDICINE

## 2022-09-08 RX ORDER — MELOXICAM 15 MG/1
15 TABLET ORAL DAILY
Qty: 90 TABLET | Refills: 3 | Status: SHIPPED | OUTPATIENT
Start: 2022-09-08

## 2022-09-09 DIAGNOSIS — R79.89 ELEVATED LFTS: Primary | ICD-10-CM

## 2022-09-09 LAB
ALBUMIN/GLOBULIN RATIO: 2 (CALC) (ref 1–2.5)
ALBUMIN: 4.7 G/DL (ref 3.6–5.1)
ALKALINE PHOSPHATASE: 152 U/L (ref 37–153)
ALT: 67 U/L (ref 6–29)
AST: 32 U/L (ref 10–35)
BILIRUBIN, TOTAL: 0.6 MG/DL (ref 0.2–1.2)
BUN: 12 MG/DL (ref 7–25)
CALCIUM: 10.2 MG/DL (ref 8.6–10.4)
CARBON DIOXIDE: 26 MMOL/L (ref 20–32)
CHLORIDE: 95 MMOL/L (ref 98–110)
CHOL/HDLC RATIO: 2.7 (CALC)
CHOLESTEROL, TOTAL: 142 MG/DL
CREATININE: 0.55 MG/DL (ref 0.5–1.05)
EGFR: 104 ML/MIN/1.73M2
GLOBULIN: 2.4 G/DL (CALC) (ref 1.9–3.7)
GLUCOSE: 97 MG/DL (ref 65–99)
HDL CHOLESTEROL: 52 MG/DL
LDL-CHOLESTEROL: 70 MG/DL (CALC)
NON-HDL CHOLESTEROL: 90 MG/DL (CALC)
POTASSIUM: 4.6 MMOL/L (ref 3.5–5.3)
PROTEIN, TOTAL: 7.1 G/DL (ref 6.1–8.1)
SED RATE BY MODIFIED$WESTERGREN: 6 MM/H
SODIUM: 131 MMOL/L (ref 135–146)
TRIGLYCERIDES: 113 MG/DL

## 2022-09-20 NOTE — TELEPHONE ENCOUNTER
Scheduled for:  Colonoscopy 29236  Provider Name:  Dr Christianne Natarajan   Date:  02/28/2023  Location:  [facility]  Sedation:  MAC  Time:  1:30pm, arrival 12:30pm  Prep:  Golytely  Meds/Allergies Reconciled?:  Physician reviewed     Diagnosis with codes:  Hx of adenomatous colon polyps Z86.010  Was patient informed to call insurance with codes (Y/N):  Yes      Referral sent?:  Referral was sent at the time of electronic surgical scheduling. [facility] or [address] [place] [address] notified?:  I sent an electronic request to Endo Scheduling and received a confirmation today.      Medication Orders:  Hold Lisinopril the night before and or morning of procedure   Misc Orders:  N/A     Further instructions given by staff:  Prep instructions were sent to patient via [employer] per patient request

## 2022-09-22 ENCOUNTER — HOSPITAL ENCOUNTER (OUTPATIENT)
Dept: MRI IMAGING | Facility: HOSPITAL | Age: 62
Discharge: HOME | End: 2022-09-22
Attending: ORTHOPAEDIC SURGERY

## 2022-09-22 DIAGNOSIS — M23.301 DEGENERATIVE TEAR OF LATERAL MENISCUS OF LEFT KNEE: ICD-10-CM

## 2022-09-22 DIAGNOSIS — M25.562 CHRONIC PAIN OF LEFT KNEE: ICD-10-CM

## 2022-09-22 DIAGNOSIS — G89.29 CHRONIC PAIN OF LEFT KNEE: ICD-10-CM

## 2022-09-22 DIAGNOSIS — M17.12 PRIMARY OSTEOARTHRITIS OF LEFT KNEE: ICD-10-CM

## 2022-09-22 DIAGNOSIS — M25.562 LEFT KNEE PAIN, UNSPECIFIED CHRONICITY: ICD-10-CM

## 2022-09-22 PROCEDURE — 73721 MRI JNT OF LWR EXTRE W/O DYE: CPT | Performed by: ORTHOPAEDIC SURGERY

## 2022-10-04 ENCOUNTER — OFFICE VISIT (OUTPATIENT)
Dept: ORTHOPEDICS CLINIC | Facility: CLINIC | Age: 62
End: 2022-10-04
Payer: COMMERCIAL

## 2022-10-04 DIAGNOSIS — M23.301 DEGENERATIVE TEAR OF LATERAL MENISCUS OF LEFT KNEE: Primary | ICD-10-CM

## 2022-10-04 DIAGNOSIS — E66.3 OVERWEIGHT (BMI 25.0-29.9): ICD-10-CM

## 2022-10-04 DIAGNOSIS — M23.204 DEGENERATIVE TEAR OF MEDIAL MENISCUS OF LEFT KNEE: ICD-10-CM

## 2022-10-04 PROCEDURE — 99213 OFFICE O/P EST LOW 20 MIN: CPT | Performed by: ORTHOPAEDIC SURGERY

## 2022-10-05 ENCOUNTER — OFFICE VISIT (OUTPATIENT)
Dept: INTERNAL MEDICINE CLINIC | Facility: CLINIC | Age: 62
End: 2022-10-05
Payer: COMMERCIAL

## 2022-10-05 VITALS
SYSTOLIC BLOOD PRESSURE: 134 MMHG | DIASTOLIC BLOOD PRESSURE: 84 MMHG | HEART RATE: 79 BPM | BODY MASS INDEX: 29.53 KG/M2 | HEIGHT: 64 IN | WEIGHT: 173 LBS

## 2022-10-05 DIAGNOSIS — R21 RASH IN ADULT: Primary | ICD-10-CM

## 2022-10-05 PROCEDURE — 3079F DIAST BP 80-89 MM HG: CPT | Performed by: NURSE PRACTITIONER

## 2022-10-05 PROCEDURE — 3075F SYST BP GE 130 - 139MM HG: CPT | Performed by: NURSE PRACTITIONER

## 2022-10-05 PROCEDURE — 99213 OFFICE O/P EST LOW 20 MIN: CPT | Performed by: NURSE PRACTITIONER

## 2022-10-05 PROCEDURE — 3008F BODY MASS INDEX DOCD: CPT | Performed by: NURSE PRACTITIONER

## 2022-10-06 ENCOUNTER — TELEPHONE (OUTPATIENT)
Dept: ORTHOPEDICS CLINIC | Facility: CLINIC | Age: 62
End: 2022-10-06

## 2022-10-06 DIAGNOSIS — M23.204 DEGENERATIVE TEAR OF MEDIAL MENISCUS OF LEFT KNEE: ICD-10-CM

## 2022-10-06 DIAGNOSIS — M23.301 DEGENERATIVE TEAR OF LATERAL MENISCUS OF LEFT KNEE: Primary | ICD-10-CM

## 2022-10-06 NOTE — TELEPHONE ENCOUNTER
Type of surgery:  Left knee arthroscopy, partial medial/Lateral meniscectony  Date: 12/2/22  Location: [facility]  Medical Clearance:      *Medical: Yes      *Dental: No      *Other:  Prior Authorization Status: Pending  Workers Comp:  Medacta/Carol:  Loma: yes  POV: 12/13/22

## 2022-10-06 NOTE — TELEPHONE ENCOUNTER
Called to let patient know that surgery date was changed from 12/1 to 12/2. She is ok with the change.

## 2022-10-06 NOTE — TELEPHONE ENCOUNTER
Spoke with Stiven Garcia to offer surgery dates. She chose 12/1/. She understands that she needs to have medical clearance within 30 days prior to surgery.

## 2022-10-10 ENCOUNTER — LAB ENCOUNTER (OUTPATIENT)
Dept: LAB | Age: 62
End: 2022-10-10
Attending: FAMILY MEDICINE
Payer: COMMERCIAL

## 2022-10-11 ENCOUNTER — PATIENT MESSAGE (OUTPATIENT)
Dept: FAMILY MEDICINE CLINIC | Facility: CLINIC | Age: 62
End: 2022-10-11

## 2022-10-11 LAB
ALBUMIN/GLOBULIN RATIO: 2.3 (CALC) (ref 1–2.5)
ALBUMIN: 4.3 G/DL (ref 3.6–5.1)
ALKALINE PHOSPHATASE: 127 U/L (ref 37–153)
ALT: 28 U/L (ref 6–29)
AST: 18 U/L (ref 10–35)
BILIRUBIN, DIRECT: 0.1 MG/DL
BILIRUBIN, INDIRECT: 0.4 MG/DL (CALC) (ref 0.2–1.2)
BILIRUBIN, TOTAL: 0.5 MG/DL (ref 0.2–1.2)
GLOBULIN: 1.9 G/DL (CALC) (ref 1.9–3.7)
PROTEIN, TOTAL: 6.2 G/DL (ref 6.1–8.1)

## 2022-10-26 ENCOUNTER — HOSPITAL ENCOUNTER (OUTPATIENT)
Age: 62
Discharge: HOME | End: 2022-10-26
Payer: COMMERCIAL

## 2022-10-26 VITALS
DIASTOLIC BLOOD PRESSURE: 65 MMHG | TEMPERATURE: 97 F | HEART RATE: 109 BPM | SYSTOLIC BLOOD PRESSURE: 167 MMHG | RESPIRATION RATE: 16 BRPM | OXYGEN SATURATION: 99 %

## 2022-10-26 DIAGNOSIS — N30.01 ACUTE CYSTITIS WITH HEMATURIA: Primary | ICD-10-CM

## 2022-10-26 LAB
GLUCOSE UR STRIP-MCNC: NEGATIVE MG/DL
KETONES UR STRIP-MCNC: NEGATIVE MG/DL
NITRITE UR QL STRIP: NEGATIVE
PH UR STRIP: 6.5 [PH]
PROT UR STRIP-MCNC: 100 MG/DL
SP GR UR STRIP: 1.01
UROBILINOGEN UR STRIP-ACNC: <2 MG/DL

## 2022-10-26 PROCEDURE — 99213 OFFICE O/P EST LOW 20 MIN: CPT | Performed by: NURSE PRACTITIONER

## 2022-10-26 PROCEDURE — 81002 URINALYSIS NONAUTO W/O SCOPE: CPT | Performed by: NURSE PRACTITIONER

## 2022-10-26 RX ORDER — PHENAZOPYRIDINE HYDROCHLORIDE 200 MG/1
200 TABLET, FILM COATED ORAL 3 TIMES DAILY PRN
Qty: 6 TABLET | Refills: 0 | Status: SHIPPED | OUTPATIENT
Start: 2022-10-26 | End: 2022-10-28

## 2022-10-26 RX ORDER — CIPROFLOXACIN 500 MG/1
500 TABLET, FILM COATED ORAL EVERY 12 HOURS
Qty: 14 TABLET | Refills: 0 | Status: SHIPPED | OUTPATIENT
Start: 2022-10-26 | End: 2022-11-02

## 2022-10-26 NOTE — ED INITIAL ASSESSMENT (HPI)
Pt presents with urinary urgency, frequency, burning with urination, blood in urine, low back pain. No fever.  Chills+

## 2022-11-01 ENCOUNTER — OFFICE VISIT (OUTPATIENT)
Dept: FAMILY MEDICINE CLINIC | Facility: CLINIC | Age: 62
End: 2022-11-01
Payer: COMMERCIAL

## 2022-11-01 ENCOUNTER — LAB ENCOUNTER (OUTPATIENT)
Dept: LAB | Age: 62
End: 2022-11-01
Attending: FAMILY MEDICINE
Payer: COMMERCIAL

## 2022-11-01 VITALS
WEIGHT: 173.81 LBS | SYSTOLIC BLOOD PRESSURE: 117 MMHG | TEMPERATURE: 99 F | HEART RATE: 92 BPM | BODY MASS INDEX: 30 KG/M2 | DIASTOLIC BLOOD PRESSURE: 79 MMHG

## 2022-11-01 DIAGNOSIS — Z01.818 PREOPERATIVE GENERAL PHYSICAL EXAMINATION: Primary | ICD-10-CM

## 2022-11-01 DIAGNOSIS — M23.207: ICD-10-CM

## 2022-11-01 DIAGNOSIS — R10.32 LEFT LOWER QUADRANT ABDOMINAL PAIN: ICD-10-CM

## 2022-11-01 DIAGNOSIS — J45.20 MILD INTERMITTENT ASTHMA WITHOUT COMPLICATION: ICD-10-CM

## 2022-11-01 DIAGNOSIS — I10 ESSENTIAL HYPERTENSION: ICD-10-CM

## 2022-11-01 PROCEDURE — 3078F DIAST BP <80 MM HG: CPT | Performed by: FAMILY MEDICINE

## 2022-11-01 PROCEDURE — 93000 ELECTROCARDIOGRAM COMPLETE: CPT | Performed by: FAMILY MEDICINE

## 2022-11-01 PROCEDURE — 99214 OFFICE O/P EST MOD 30 MIN: CPT | Performed by: FAMILY MEDICINE

## 2022-11-01 PROCEDURE — 3074F SYST BP LT 130 MM HG: CPT | Performed by: FAMILY MEDICINE

## 2022-11-02 LAB
ABSOLUTE BASOPHILS: 32 CELLS/UL (ref 0–200)
ABSOLUTE EOSINOPHILS: 148 CELLS/UL (ref 15–500)
ABSOLUTE LYMPHOCYTES: 1458 CELLS/UL (ref 850–3900)
ABSOLUTE MONOCYTES: 466 CELLS/UL (ref 200–950)
ABSOLUTE NEUTROPHILS: 3196 CELLS/UL (ref 1500–7800)
ALBUMIN/GLOBULIN RATIO: 2.3 (CALC) (ref 1–2.5)
ALBUMIN: 4.5 G/DL (ref 3.6–5.1)
ALKALINE PHOSPHATASE: 126 U/L (ref 37–153)
ALT: 34 U/L (ref 6–29)
APPEARANCE: CLEAR
AST: 20 U/L (ref 10–35)
BASOPHILS: 0.6 %
BILIRUBIN, TOTAL: 0.5 MG/DL (ref 0.2–1.2)
BILIRUBIN: NEGATIVE
BUN: 9 MG/DL (ref 7–25)
CALCIUM: 9.7 MG/DL (ref 8.6–10.4)
CARBON DIOXIDE: 27 MMOL/L (ref 20–32)
CHLORIDE: 99 MMOL/L (ref 98–110)
COLOR: YELLOW
CREATININE: 0.52 MG/DL (ref 0.5–1.05)
EGFR: 104 ML/MIN/1.73M2
EOSINOPHILS: 2.8 %
GLOBULIN: 2 G/DL (CALC) (ref 1.9–3.7)
GLUCOSE: 85 MG/DL (ref 65–99)
GLUCOSE: NEGATIVE
HEMATOCRIT: 38.9 % (ref 35–45)
HEMOGLOBIN: 13.4 G/DL (ref 11.7–15.5)
KETONES: NEGATIVE
LEUKOCYTE ESTERASE: NEGATIVE
LYMPHOCYTES: 27.5 %
MCH: 30.9 PG (ref 27–33)
MCHC: 34.4 G/DL (ref 32–36)
MCV: 89.8 FL (ref 80–100)
MONOCYTES: 8.8 %
MPV: 9.8 FL (ref 7.5–12.5)
NEUTROPHILS: 60.3 %
NITRITE: NEGATIVE
OCCULT BLOOD: NEGATIVE
PH: 7 (ref 5–8)
PLATELET COUNT: 280 THOUSAND/UL (ref 140–400)
POTASSIUM: 4.3 MMOL/L (ref 3.5–5.3)
PROTEIN, TOTAL: 6.5 G/DL (ref 6.1–8.1)
PROTEIN: NEGATIVE
RDW: 12 % (ref 11–15)
RED BLOOD CELL COUNT: 4.33 MILLION/UL (ref 3.8–5.1)
SODIUM: 134 MMOL/L (ref 135–146)
SPECIFIC GRAVITY: 1.01 (ref 1–1.03)
WHITE BLOOD CELL COUNT: 5.3 THOUSAND/UL (ref 3.8–10.8)

## 2022-11-03 ENCOUNTER — TELEPHONE (OUTPATIENT)
Dept: FAMILY MEDICINE CLINIC | Facility: CLINIC | Age: 62
End: 2022-11-03

## 2022-11-12 ENCOUNTER — LAB ENCOUNTER (OUTPATIENT)
Dept: LAB | Facility: HOSPITAL | Age: 62
End: 2022-11-12
Attending: FAMILY MEDICINE
Payer: COMMERCIAL

## 2022-11-12 ENCOUNTER — HOSPITAL ENCOUNTER (OUTPATIENT)
Dept: GENERAL RADIOLOGY | Facility: HOSPITAL | Age: 62
Discharge: HOME | End: 2022-11-12
Attending: FAMILY MEDICINE
Payer: COMMERCIAL

## 2022-11-12 DIAGNOSIS — Z01.818 PREOPERATIVE GENERAL PHYSICAL EXAMINATION: ICD-10-CM

## 2022-11-12 PROCEDURE — 71046 X-RAY EXAM CHEST 2 VIEWS: CPT | Performed by: FAMILY MEDICINE

## 2022-11-12 PROCEDURE — 87081 CULTURE SCREEN ONLY: CPT | Performed by: FAMILY MEDICINE

## 2022-11-16 RX ORDER — ATORVASTATIN CALCIUM 40 MG/1
TABLET, FILM COATED ORAL
Qty: 90 TABLET | Refills: 1 | Status: SHIPPED | OUTPATIENT
Start: 2022-11-16

## 2022-11-16 NOTE — TELEPHONE ENCOUNTER
Refill passed per [place] [facility] protocol.   Requested Prescriptions   Pending Prescriptions Disp Refills    ATORVASTATIN 40 MG Oral Tab [Pharmacy Med Name: ATORVASTATIN 40MG TABLETS] 90 tablet 1     Sig: TAKE 1 TABLET(40 MG) BY MOUTH EVERY NIGHT       Cholesterol Medication Protocol Passed - 11/16/2022  5:58 AM        Passed - ALT in past 12 months        Passed - LDL in past 12 months        Passed - Last ALT < 80     Lab Results   Component Value Date    ALT 34 (H) 11/01/2022             Passed - Last LDL < 130     Lab Results   Component Value Date    LDL 70 09/08/2022             Passed - In person appointment or virtual visit in the past 12 mos or appointment in next 3 mos     Recent Outpatient Visits              2 weeks ago Preoperative general physical examination    [facility], Raisa Howe Clydia Eke, MD    Office Visit    1 month ago Rash in adult    [facility], [address], [place], [place], [address]    Office Visit    1 month ago Degenerative tear of lateral meniscus of left knee    [facility] for Carmelina Crass, MD    Office Visit    2 months ago Routine physical examination    [facility], Raisa Howe Clydia Eke, MD    Office Visit    3 months ago Primary osteoarthritis of left knee    [facility] for Carmelina Crass, MD    Office Visit          Future Appointments         Provider Department Appt Notes    In 3 weeks Jose Quesada MD [facility], [address], [place] 1st POV Left knee arthroscopy    In 2 months Mahesh Gramajo MD [address], [address], [place] Referral from orthopedic doctor    In 3 months [address] @ [address] [place] [address]

## 2022-11-29 ENCOUNTER — LAB ENCOUNTER (OUTPATIENT)
Dept: LAB | Facility: HOSPITAL | Age: 62
End: 2022-11-29
Attending: ORTHOPAEDIC SURGERY
Payer: COMMERCIAL

## 2022-11-29 DIAGNOSIS — Z01.818 PREOP TESTING: ICD-10-CM

## 2022-11-30 LAB — SARS-COV-2 RNA RESP QL NAA+PROBE: NOT DETECTED

## 2022-12-02 ENCOUNTER — ANESTHESIA (OUTPATIENT)
Dept: SURGERY | Facility: HOSPITAL | Age: 62
End: 2022-12-02
Payer: COMMERCIAL

## 2022-12-02 ENCOUNTER — HOSPITAL ENCOUNTER (OUTPATIENT)
Facility: HOSPITAL | Age: 62
Setting detail: HOSPITAL OUTPATIENT SURGERY
Discharge: HOME | End: 2022-12-02
Attending: ORTHOPAEDIC SURGERY | Admitting: ORTHOPAEDIC SURGERY
Payer: COMMERCIAL

## 2022-12-02 ENCOUNTER — ANESTHESIA EVENT (OUTPATIENT)
Dept: SURGERY | Facility: HOSPITAL | Age: 62
End: 2022-12-02
Payer: COMMERCIAL

## 2022-12-02 VITALS
TEMPERATURE: 98 F | BODY MASS INDEX: 28.51 KG/M2 | DIASTOLIC BLOOD PRESSURE: 82 MMHG | HEART RATE: 93 BPM | SYSTOLIC BLOOD PRESSURE: 143 MMHG | OXYGEN SATURATION: 100 % | HEIGHT: 64 IN | RESPIRATION RATE: 16 BRPM | WEIGHT: 167 LBS

## 2022-12-02 DIAGNOSIS — Z01.818 PREOP TESTING: Primary | ICD-10-CM

## 2022-12-02 PROBLEM — M23.42 LOOSE BODY IN KNEE, LEFT: Status: ACTIVE | Noted: 2022-12-02

## 2022-12-02 PROBLEM — M65.962 SYNOVITIS OF LEFT KNEE: Status: ACTIVE | Noted: 2022-12-02

## 2022-12-02 PROBLEM — M23.204 DEGENERATIVE TEAR OF MEDIAL MENISCUS, LEFT: Status: ACTIVE | Noted: 2022-12-02

## 2022-12-02 PROBLEM — M65.9 SYNOVITIS OF LEFT KNEE: Status: ACTIVE | Noted: 2022-12-02

## 2022-12-02 PROBLEM — M23.301 DEGENERATIVE TEAR OF LATERAL MENISCUS, LEFT: Status: ACTIVE | Noted: 2022-12-02

## 2022-12-02 PROCEDURE — 0SBD4ZZ EXCISION OF LEFT KNEE JOINT, PERCUTANEOUS ENDOSCOPIC APPROACH: ICD-10-PCS | Performed by: ORTHOPAEDIC SURGERY

## 2022-12-02 RX ORDER — ACETAMINOPHEN 500 MG
1000 TABLET ORAL ONCE
Status: COMPLETED | OUTPATIENT
Start: 2022-12-02 | End: 2022-12-02

## 2022-12-02 RX ORDER — SODIUM CHLORIDE, SODIUM LACTATE, POTASSIUM CHLORIDE, CALCIUM CHLORIDE 600; 310; 30; 20 MG/100ML; MG/100ML; MG/100ML; MG/100ML
INJECTION, SOLUTION INTRAVENOUS CONTINUOUS
Status: DISCONTINUED | OUTPATIENT
Start: 2022-12-02 | End: 2022-12-02

## 2022-12-02 RX ORDER — IBUPROFEN 600 MG/1
600 TABLET ORAL EVERY 6 HOURS PRN
Qty: 40 TABLET | Refills: 0 | Status: SHIPPED | OUTPATIENT
Start: 2022-12-02

## 2022-12-02 RX ORDER — MORPHINE SULFATE 2 MG/ML
2 INJECTION, SOLUTION INTRAMUSCULAR; INTRAVENOUS EVERY 10 MIN PRN
OUTPATIENT
Start: 2022-12-02

## 2022-12-02 RX ORDER — PHENAZOPYRIDINE HYDROCHLORIDE 200 MG/1
200 TABLET, FILM COATED ORAL 3 TIMES DAILY PRN
Qty: 6 TABLET | Refills: 0 | Status: SHIPPED | OUTPATIENT
Start: 2022-12-02 | End: 2022-12-04

## 2022-12-02 RX ORDER — POLYETHYLENE GLYCOL 3350, SODIUM SULFATE ANHYDROUS, SODIUM BICARBONATE, SODIUM CHLORIDE, POTASSIUM CHLORIDE 236; 22.74; 6.74; 5.86; 2.97 G/4L; G/4L; G/4L; G/4L; G/4L
4 POWDER, FOR SOLUTION ORAL ONCE
Qty: 1 EACH | Refills: 0 | Status: SHIPPED | OUTPATIENT
Start: 2022-12-02 | End: 2022-12-02

## 2022-12-02 RX ORDER — VANCOMYCIN HYDROCHLORIDE
15 ONCE
Status: COMPLETED | OUTPATIENT
Start: 2022-12-02 | End: 2022-12-02

## 2022-12-02 RX ORDER — ONDANSETRON 2 MG/ML
4 INJECTION INTRAMUSCULAR; INTRAVENOUS EVERY 6 HOURS PRN
Status: CANCELLED | OUTPATIENT
Start: 2022-12-02

## 2022-12-02 RX ORDER — HYDROMORPHONE HYDROCHLORIDE 1 MG/ML
0.4 INJECTION, SOLUTION INTRAMUSCULAR; INTRAVENOUS; SUBCUTANEOUS EVERY 5 MIN PRN
OUTPATIENT
Start: 2022-12-02

## 2022-12-02 RX ORDER — SODIUM CHLORIDE, SODIUM LACTATE, POTASSIUM CHLORIDE, CALCIUM CHLORIDE 600; 310; 30; 20 MG/100ML; MG/100ML; MG/100ML; MG/100ML
INJECTION, SOLUTION INTRAVENOUS CONTINUOUS
OUTPATIENT
Start: 2022-12-02

## 2022-12-02 RX ORDER — HYDROMORPHONE HYDROCHLORIDE 1 MG/ML
0.6 INJECTION, SOLUTION INTRAMUSCULAR; INTRAVENOUS; SUBCUTANEOUS EVERY 5 MIN PRN
OUTPATIENT
Start: 2022-12-02

## 2022-12-02 RX ORDER — TRAMADOL HYDROCHLORIDE 50 MG/1
50 TABLET ORAL EVERY 6 HOURS PRN
Qty: 30 TABLET | Refills: 1 | Status: SHIPPED | OUTPATIENT
Start: 2022-12-02

## 2022-12-02 RX ORDER — IBUPROFEN 600 MG/1
600 TABLET ORAL ONCE
Status: DISCONTINUED | OUTPATIENT
Start: 2022-12-02 | End: 2022-12-02 | Stop reason: ALTCHOICE

## 2022-12-02 RX ORDER — NALOXONE HYDROCHLORIDE 0.4 MG/ML
80 INJECTION, SOLUTION INTRAMUSCULAR; INTRAVENOUS; SUBCUTANEOUS AS NEEDED
OUTPATIENT
Start: 2022-12-02 | End: 2022-12-02

## 2022-12-02 RX ORDER — HYDROMORPHONE HYDROCHLORIDE 1 MG/ML
0.2 INJECTION, SOLUTION INTRAMUSCULAR; INTRAVENOUS; SUBCUTANEOUS EVERY 5 MIN PRN
OUTPATIENT
Start: 2022-12-02

## 2022-12-02 RX ORDER — LIDOCAINE HYDROCHLORIDE 10 MG/ML
INJECTION, SOLUTION EPIDURAL; INFILTRATION; INTRACAUDAL; PERINEURAL AS NEEDED
Status: DISCONTINUED | OUTPATIENT
Start: 2022-12-02 | End: 2022-12-02 | Stop reason: SURG

## 2022-12-02 RX ORDER — MORPHINE SULFATE 4 MG/ML
4 INJECTION, SOLUTION INTRAMUSCULAR; INTRAVENOUS EVERY 10 MIN PRN
OUTPATIENT
Start: 2022-12-02

## 2022-12-02 RX ORDER — ACETAMINOPHEN 500 MG
1000 TABLET ORAL EVERY 8 HOURS SCHEDULED
Status: CANCELLED | OUTPATIENT
Start: 2022-12-02

## 2022-12-02 RX ORDER — MORPHINE SULFATE 10 MG/ML
6 INJECTION, SOLUTION INTRAMUSCULAR; INTRAVENOUS EVERY 10 MIN PRN
OUTPATIENT
Start: 2022-12-02

## 2022-12-02 RX ORDER — ASPIRIN 81 MG/1
81 TABLET ORAL 2 TIMES DAILY WITH MEALS
Qty: 24 TABLET | Refills: 0 | Status: SHIPPED | OUTPATIENT
Start: 2022-12-02

## 2022-12-02 RX ORDER — ONDANSETRON 2 MG/ML
INJECTION INTRAMUSCULAR; INTRAVENOUS AS NEEDED
Status: DISCONTINUED | OUTPATIENT
Start: 2022-12-02 | End: 2022-12-02 | Stop reason: SURG

## 2022-12-02 RX ORDER — ROPIVACAINE HYDROCHLORIDE 5 MG/ML
INJECTION, SOLUTION EPIDURAL; INFILTRATION; PERINEURAL AS NEEDED
Status: DISCONTINUED | OUTPATIENT
Start: 2022-12-02 | End: 2022-12-02 | Stop reason: HOSPADM

## 2022-12-02 RX ORDER — DEXAMETHASONE SODIUM PHOSPHATE 4 MG/ML
VIAL (ML) INJECTION AS NEEDED
Status: DISCONTINUED | OUTPATIENT
Start: 2022-12-02 | End: 2022-12-02 | Stop reason: SURG

## 2022-12-02 RX ADMIN — ONDANSETRON 4 MG: 2 INJECTION INTRAMUSCULAR; INTRAVENOUS at 12:50:00

## 2022-12-02 RX ADMIN — SODIUM CHLORIDE, SODIUM LACTATE, POTASSIUM CHLORIDE, CALCIUM CHLORIDE: 600; 310; 30; 20 INJECTION, SOLUTION INTRAVENOUS at 13:59:00

## 2022-12-02 RX ADMIN — LIDOCAINE HYDROCHLORIDE 50 MG: 10 INJECTION, SOLUTION EPIDURAL; INFILTRATION; INTRACAUDAL; PERINEURAL at 12:51:00

## 2022-12-02 RX ADMIN — DEXAMETHASONE SODIUM PHOSPHATE 4 MG: 4 MG/ML VIAL (ML) INJECTION at 12:51:00

## 2022-12-02 NOTE — ANESTHESIA PROCEDURE NOTES
Airway  Date/Time: 12/2/2022 12:57 PM  Urgency: Elective      General Information and Staff    Patient location during procedure: OR  Anesthesiologist: Patricia Castro MD  Performed: anesthesiologist     Indications and Patient Condition  Indications for airway management: anesthesia  Sedation level: deep  Preoxygenated: yes  Patient position: sniffing  Mask difficulty assessment: 1 - vent by mask    Final Airway Details  Final airway type: supraglottic airway      Successful airway: classic  Size 4       Number of attempts at approach: 1

## 2022-12-02 NOTE — ANESTHESIA POSTPROCEDURE EVALUATION
Patient: Claudine Lawton    Procedure Summary     Date: 12/02/22 Room / Location: [address] [place] [address] MAIN OR 17 / [address] [place] [address] MAIN OR    Anesthesia Start: 8708 Anesthesia Stop: 1411    Procedure: Left knee arthroscopy, partial medial and lateral meniscectomy, removal of loose body, lateral synovectomy (Left: Knee) Diagnosis:       Degenerative tear of lateral meniscus of left knee      Degenerative tear of medial meniscus of left knee      (Degenerative tear of lateral meniscus of left knee [M23.301]Degenerative tear of medial meniscus of left knee [V20.653])    Surgeons: Araceli Montoya MD Anesthesiologist: Ally Alford MD    Anesthesia Type: general ASA Status: 2          Anesthesia Type: general    Vitals Value Taken Time   /85 12/02/22 1404   Temp 98.1 12/02/22 1404   Pulse 100 12/02/22 1404   Resp 9 12/02/22 1404   SpO2 99 % 12/02/22 1404   Vitals shown include unvalidated device data.     [address] [place] [address] AN Post Evaluation:   Patient Evaluated in PACU  Patient Participation: complete - patient participated  Level of Consciousness: awake  Pain Management: adequate  Airway Patency:patent  Dental exam unchanged from preop  Yes    Cardiovascular Status: acceptable  Respiratory Status: acceptable  Postoperative Hydration acceptable      Dieudonne Bazan MD  12/2/2022 2:04 PM

## 2022-12-02 NOTE — INTERVAL H&P NOTE
Pre-op Diagnosis: Degenerative tear of lateral meniscus of left knee [M23.301]  Degenerative tear of medial meniscus of left knee [M23.204]    The above referenced H&P was reviewed by Awilda Staton MD on 12/2/2022, the patient was examined and no significant changes have occurred in the patient's condition since the H&P was performed. I discussed with the patient and/or legal representative the potential benefits, risks and side effects of this procedure; the likelihood of the patient achieving goals; and potential problems that might occur during recuperation. I discussed reasonable alternatives to the procedure, including risks, benefits and side effects related to the alternatives and risks related to not receiving this procedure. We will proceed with procedure as planned.

## 2022-12-03 NOTE — OPERATIVE REPORT
[facility]    PATIENT'S NAME: Maverick Fabian   ATTENDING PHYSICIAN: Nickolas Posey Garnette Councilman, MD   OPERATING PHYSICIAN: Nickolas Posey Garnette Councilman, MD   PATIENT ACCOUNT#:   [id number]    LOCATION:  [address]  MEDICAL RECORD #:   [id number]       YOB: 1960  ADMISSION DATE:       12/02/2022      OPERATION DATE:  12/02/2022    OPERATIVE REPORT      PREOPERATIVE DIAGNOSIS:  Left knee with degenerative tears, medial and lateral menisci. POSTOPERATIVE DIAGNOSIS:  Left knee:  1. Medial and lateral meniscal tears. 2.   Loose body (soft, 12 mm). 3.   Synovitis, lateral compartment. PROCEDURE:  Arthroscopy of left knee with:  1. Partial medial and lateral meniscectomies. 2.   Removal of loose body. 3.   Lateral synovectomy. ASSISTANT:  Desmond Greer PA-C     ANESTHESIA:  Dr. Anabelle Strong, with general laryngeal mask anesthesia. INDICATIONS:  Patient is a 79-year-old woman with the above diagnoses documented by physical exam, x-ray, and MRI. She has failed nonoperative treatment. The risks and complications of surgery were discussed. No guarantees were given. The consent was signed. Patient has a predominant complaint of lateral snapping, although she does have some medial pain. We made her aware that she will likely need a knee replacement in the not-too-distant future. She is not interested in replacement at this time, but does want to try to get rid of the snapping while she loses more weight. I agreed to proceed with arthroscopy to try to relieve the snapping predominantly. OPERATIVE TECHNIQUE:  Patient was brought to the operating room and placed in supine position. Appropriate IV access and monitors were placed. Vancomycin 1 g IV was given as a prophylaxis, as she stated she had a penicillin allergy. General laryngeal mask anesthesia was performed by Dr. Anabelle Strong. A tourniquet was placed on the upper left thigh, followed by a Jerad leg goddard.   The right leg had an SCD boot. A padded bump was then placed, and the table was let down. The left lower extremity was then prepped and draped in a sterile fashion with ChloraPrep. The leg was exsanguinated and the tourniquet inflated to 300 mmHg. The Portals were created, and the suprapatellar pouch showed grade 3 chondromalacia of the patella and near complete grade 4 chondromalacia of the trochlea in a wide area. The medial gutter was unremarkable except for some mild degenerative synovitis. The medial compartment showed grade 2 chondromalacia of the femur and a degenerative posterior horn meniscal tear. There was also a degenerative anterior horn meniscal tear. These were debrided with a combination of shaver and ArthroWand. The ACL was degenerative, but intact. The lateral compartment had exposed bone on the lateral tibial plateau and grade 3 chondromalacia of the femoral condyle. There was tearing of the lateral meniscus from anterior to posterior. The anterior portion was mostly shredded and had to be sacrificed during the debridement. This could have been a source for snapping. The intrameniscal ligament also seemed to be lax or not tethered, and I debrided this as well. A very small portion of the anterior horn meniscus was sacrificed with this. The shaver and ArthroWand were used for the meniscectomy. The lateral side had synovitis while the medial side did not. The lateral synovitis was debrided. The lateral gutter then showed a soft loose body, likely a resolved hematoma fragment. There was no bony portion of it. I removed it with a grasper. Picture was taken outside the body with a ruler showing it was 12 mm. A second-look arthroscopy showed no loose fragments. The wounds were closed with nylon suture and the knee injected with 30 mL of Naropin 0.5% for postop pain relief. Sterile dressings were applied.   The patient was awakened, extubated, and brought to the recovery room in stable condition. For postop pain relief, she is going to be given ibuprofen and tramadol, which she said she could tolerate in the preop area. While she carries an allergy on her chart of problems with anti-inflammatories, she regularly takes meloxicam and says she can take aspirin and ibuprofen. She also can take Tylenol. Postop instructions were given in both written and oral form to her and her . She will follow up with Dr. Pollo Dennis in 1 to 2 weeks' time in the office. Blood loss was 5 mL. No specimens were sent. No drains were used. No complications were noted. Patient tolerated the procedure well. Dictated By Barb Magaña. Pollo Dennis MD  d: 12/02/2022 14:07:49  t: 12/02/2022 22:57:24  Job [id number]  BRETT/    cc: Nae Julio MD

## 2022-12-13 ENCOUNTER — OFFICE VISIT (OUTPATIENT)
Dept: ORTHOPEDICS CLINIC | Facility: CLINIC | Age: 62
End: 2022-12-13
Payer: COMMERCIAL

## 2022-12-13 DIAGNOSIS — G89.29 CHRONIC PAIN OF LEFT KNEE: ICD-10-CM

## 2022-12-13 DIAGNOSIS — M17.12 PRIMARY OSTEOARTHRITIS OF LEFT KNEE: ICD-10-CM

## 2022-12-13 DIAGNOSIS — M25.562 CHRONIC PAIN OF LEFT KNEE: ICD-10-CM

## 2022-12-13 DIAGNOSIS — M23.204 DEGENERATIVE TEAR OF MEDIAL MENISCUS OF LEFT KNEE: ICD-10-CM

## 2022-12-13 DIAGNOSIS — M23.301 DEGENERATIVE TEAR OF LATERAL MENISCUS OF LEFT KNEE: Primary | ICD-10-CM

## 2022-12-13 PROCEDURE — 99024 POSTOP FOLLOW-UP VISIT: CPT | Performed by: ORTHOPAEDIC SURGERY

## 2022-12-21 RX ORDER — LORATADINE 10 MG/1
TABLET ORAL
Qty: 30 TABLET | Refills: 5 | Status: SHIPPED | OUTPATIENT
Start: 2022-12-21

## 2023-01-23 ENCOUNTER — TELEPHONE (OUTPATIENT)
Dept: OTOLARYNGOLOGY | Facility: CLINIC | Age: 63
End: 2023-01-23

## 2023-02-02 ENCOUNTER — OFFICE VISIT (OUTPATIENT)
Dept: SURGERY | Facility: CLINIC | Age: 63
End: 2023-02-02
Payer: COMMERCIAL

## 2023-02-02 VITALS
BODY MASS INDEX: 29.67 KG/M2 | WEIGHT: 173.81 LBS | HEART RATE: 96 BPM | HEIGHT: 64.2 IN | OXYGEN SATURATION: 100 % | DIASTOLIC BLOOD PRESSURE: 76 MMHG | SYSTOLIC BLOOD PRESSURE: 123 MMHG

## 2023-02-02 DIAGNOSIS — M17.0 PRIMARY OSTEOARTHRITIS OF BOTH KNEES: Primary | ICD-10-CM

## 2023-02-02 DIAGNOSIS — I10 PRIMARY HYPERTENSION: ICD-10-CM

## 2023-02-02 DIAGNOSIS — E78.5 DYSLIPIDEMIA: ICD-10-CM

## 2023-02-02 DIAGNOSIS — E66.3 OVERWEIGHT (BMI 25.0-29.9): ICD-10-CM

## 2023-02-02 PROCEDURE — 3074F SYST BP LT 130 MM HG: CPT | Performed by: INTERNAL MEDICINE

## 2023-02-02 PROCEDURE — 3078F DIAST BP <80 MM HG: CPT | Performed by: INTERNAL MEDICINE

## 2023-02-02 PROCEDURE — 99204 OFFICE O/P NEW MOD 45 MIN: CPT | Performed by: INTERNAL MEDICINE

## 2023-02-02 PROCEDURE — 3008F BODY MASS INDEX DOCD: CPT | Performed by: INTERNAL MEDICINE

## 2023-02-21 ENCOUNTER — HOSPITAL ENCOUNTER (OUTPATIENT)
Age: 63
Discharge: HOME | End: 2023-02-21
Payer: COMMERCIAL

## 2023-02-21 VITALS
DIASTOLIC BLOOD PRESSURE: 69 MMHG | OXYGEN SATURATION: 100 % | TEMPERATURE: 99 F | RESPIRATION RATE: 18 BRPM | HEART RATE: 100 BPM | SYSTOLIC BLOOD PRESSURE: 148 MMHG

## 2023-02-21 DIAGNOSIS — T78.40XA ALLERGIC REACTION, INITIAL ENCOUNTER: Primary | ICD-10-CM

## 2023-02-21 PROCEDURE — 99203 OFFICE O/P NEW LOW 30 MIN: CPT | Performed by: PHYSICIAN ASSISTANT

## 2023-02-21 RX ORDER — MELOXICAM 15 MG/1
TABLET ORAL
COMMUNITY
Start: 2023-02-14

## 2023-02-21 RX ORDER — FAMOTIDINE 20 MG/1
20 TABLET, FILM COATED ORAL 2 TIMES DAILY PRN
Qty: 30 TABLET | Refills: 0 | Status: SHIPPED | OUTPATIENT
Start: 2023-02-21 | End: 2023-02-26

## 2023-02-21 RX ORDER — PREDNISONE 20 MG/1
40 TABLET ORAL DAILY
Qty: 10 TABLET | Refills: 0 | Status: SHIPPED | OUTPATIENT
Start: 2023-02-21 | End: 2023-02-26

## 2023-02-21 NOTE — ED INITIAL ASSESSMENT (HPI)
Pt c/o redness and itching to back, B arms and neck while at work today. States took dayquil cold and flu this am for a runny nose. Symptoms started 1 hr after. No SOB. No throat tightness.

## 2023-02-22 ENCOUNTER — TELEPHONE (OUTPATIENT)
Facility: CLINIC | Age: 63
End: 2023-02-22

## 2023-02-22 NOTE — TELEPHONE ENCOUNTER
I spoke to the pt and she was able to find her bowel prep instructions on her MyChart.     If she has further questions she will call us back

## 2023-02-28 ENCOUNTER — ANESTHESIA (OUTPATIENT)
Dept: ENDOSCOPY | Facility: HOSPITAL | Age: 63
End: 2023-02-28
Payer: COMMERCIAL

## 2023-02-28 ENCOUNTER — HOSPITAL ENCOUNTER (OUTPATIENT)
Facility: HOSPITAL | Age: 63
Setting detail: HOSPITAL OUTPATIENT SURGERY
Discharge: HOME | End: 2023-02-28
Attending: INTERNAL MEDICINE | Admitting: INTERNAL MEDICINE
Payer: COMMERCIAL

## 2023-02-28 ENCOUNTER — ANESTHESIA EVENT (OUTPATIENT)
Dept: ENDOSCOPY | Facility: HOSPITAL | Age: 63
End: 2023-02-28
Payer: COMMERCIAL

## 2023-02-28 VITALS
HEART RATE: 84 BPM | WEIGHT: 173 LBS | HEIGHT: 64 IN | BODY MASS INDEX: 29.53 KG/M2 | DIASTOLIC BLOOD PRESSURE: 81 MMHG | RESPIRATION RATE: 18 BRPM | SYSTOLIC BLOOD PRESSURE: 131 MMHG | OXYGEN SATURATION: 100 %

## 2023-02-28 DIAGNOSIS — Z86.010 HISTORY OF ADENOMATOUS POLYP OF COLON: ICD-10-CM

## 2023-02-28 PROCEDURE — 0DBL8ZX EXCISION OF TRANSVERSE COLON, VIA NATURAL OR ARTIFICIAL OPENING ENDOSCOPIC, DIAGNOSTIC: ICD-10-PCS | Performed by: INTERNAL MEDICINE

## 2023-02-28 PROCEDURE — 0DBN8ZX EXCISION OF SIGMOID COLON, VIA NATURAL OR ARTIFICIAL OPENING ENDOSCOPIC, DIAGNOSTIC: ICD-10-PCS | Performed by: INTERNAL MEDICINE

## 2023-02-28 PROCEDURE — 0DBK8ZX EXCISION OF ASCENDING COLON, VIA NATURAL OR ARTIFICIAL OPENING ENDOSCOPIC, DIAGNOSTIC: ICD-10-PCS | Performed by: INTERNAL MEDICINE

## 2023-02-28 PROCEDURE — 45385 COLONOSCOPY W/LESION REMOVAL: CPT | Performed by: INTERNAL MEDICINE

## 2023-02-28 RX ORDER — SODIUM CHLORIDE, SODIUM LACTATE, POTASSIUM CHLORIDE, CALCIUM CHLORIDE 600; 310; 30; 20 MG/100ML; MG/100ML; MG/100ML; MG/100ML
INJECTION, SOLUTION INTRAVENOUS CONTINUOUS
Status: DISCONTINUED | OUTPATIENT
Start: 2023-02-28 | End: 2023-02-28

## 2023-02-28 RX ORDER — NALOXONE HYDROCHLORIDE 0.4 MG/ML
80 INJECTION, SOLUTION INTRAMUSCULAR; INTRAVENOUS; SUBCUTANEOUS AS NEEDED
Status: DISCONTINUED | OUTPATIENT
Start: 2023-02-28 | End: 2023-02-28

## 2023-02-28 RX ORDER — LIDOCAINE HYDROCHLORIDE 10 MG/ML
INJECTION, SOLUTION EPIDURAL; INFILTRATION; INTRACAUDAL; PERINEURAL AS NEEDED
Status: DISCONTINUED | OUTPATIENT
Start: 2023-02-28 | End: 2023-02-28 | Stop reason: SURG

## 2023-02-28 RX ADMIN — LIDOCAINE HYDROCHLORIDE 50 MG: 10 INJECTION, SOLUTION EPIDURAL; INFILTRATION; INTRACAUDAL; PERINEURAL at 13:37:00

## 2023-02-28 RX ADMIN — SODIUM CHLORIDE, SODIUM LACTATE, POTASSIUM CHLORIDE, CALCIUM CHLORIDE: 600; 310; 30; 20 INJECTION, SOLUTION INTRAVENOUS at 13:30:00

## 2023-02-28 NOTE — DISCHARGE INSTRUCTIONS
.  .  .  Notes from Dr Genesis Coreas:  4 SMALL benign colon polyps were found and removed today - to be sent to the lab to be examined - a letter will be sent with results. You may see small quantities of blood with your next 1-2 bowel movements today. If you check your results on [facility], the \"pathology\" report on the colon polyp(s) should be released within the next 24-48 hours. In general, a \"hyperplastic\" colon polyp is completely benign, vs an \"adenoma\" or \"sessile serrated adenoma\" which is considered a benign but precancerous colon polyp. That will be explained in a letter/email from me as well. No aspirin, Excedrin, Advil/Motrin/ibuprofen, Aleve/naproxen for next 5 days (to prevent bleeding.)  Internal hemorrhoids - very common  I recommend repeat colonoscopy exam in 3 years. .  .  . Home Care Instructions for Colonoscopy with Sedation    Diet:  - Resume your regular diet as tolerated unless otherwise instructed. - Start with light meals to minimize bloating.  - Do not drink alcohol today. Medication:  - If you have questions about resuming your normal medications, please contact your Primary Care Physician. Activities:  - Take it easy today. Do not return to work today. - Do not drive today. - Do not operate any machinery today (including kitchen equipment). Colonoscopy:  - You may notice some rectal \"spotting\" (a little blood on the toilet tissue) for a day or two after the exam. This is normal.  - If you experience any rectal bleeding (not spotting), persistent tenderness or sharp severe abdominal pains, oral temperature over 100 degrees Fahrenheit, light-headedness or dizziness, or any other problems, contact your doctor.     **If unable to reach your doctor, please go to the [facility] Emergency Room**    - Your referring physician will receive a full report of your examination.  - If you do not hear from your doctor's office within two weeks of your biopsy, please call them for your results. You may be able to see your laboratory results in [facility] between 4 and 7 business days. In some cases, your physician may not have viewed the results before they are released to [address] If you have questions regarding your results contact the physician who ordered the test/exam by phone or via [address] by choosing \"Ask a Medical Question. \"

## 2023-02-28 NOTE — ANESTHESIA POSTPROCEDURE EVALUATION
Patient: Sandra House    Procedure Summary     Date: 02/28/23 Room / Location: [address] [place] [address] ENDOSCOPY 05 / [address] [place] [address] ENDOSCOPY    Anesthesia Start: 3369 Anesthesia Stop:     Procedure: COLONOSCOPY Diagnosis:       History of adenomatous polyp of colon      (polyps, hemorrhoids)    Surgeons: Becky Caputo MD Anesthesiologist: Joseph Hidalgo CRNA    Anesthesia Type: MAC ASA Status: 3          Anesthesia Type: MAC    Vitals Value Taken Time   /77 02/28/23 1413   Temp / 02/28/23 1413   Pulse 102 02/28/23 1413   Resp 16 02/28/23 1413   SpO2 98% 02/28/23 1413   Vitals shown include unvalidated device data.     [address] [place] [address] AN Post Evaluation:   Patient Evaluated in PACU  Patient Participation: complete - patient participated  Level of Consciousness: awake  Pain Score: 0  Pain Management: adequate  Airway Patency:patent  Dental exam unchanged from preop  Yes    Cardiovascular Status: acceptable and hemodynamically stable  Respiratory Status: room air  Postoperative Hydration acceptable      Chad Martínez CRNA  2/28/2023 2:13 PM

## 2023-03-07 ENCOUNTER — MED REC SCAN ONLY (OUTPATIENT)
Facility: CLINIC | Age: 63
End: 2023-03-07

## 2023-03-07 ENCOUNTER — TELEPHONE (OUTPATIENT)
Dept: OTOLARYNGOLOGY | Facility: CLINIC | Age: 63
End: 2023-03-07

## 2023-03-08 RX ORDER — CYCLOBENZAPRINE HCL 5 MG
5 TABLET ORAL NIGHTLY
Qty: 30 TABLET | Refills: 2 | Status: SHIPPED | OUTPATIENT
Start: 2023-03-08

## 2023-03-17 ENCOUNTER — HOSPITAL ENCOUNTER (OUTPATIENT)
Dept: GENERAL RADIOLOGY | Facility: HOSPITAL | Age: 63
Discharge: HOME | End: 2023-03-17
Attending: NURSE PRACTITIONER
Payer: COMMERCIAL

## 2023-03-17 ENCOUNTER — OFFICE VISIT (OUTPATIENT)
Dept: INTERNAL MEDICINE CLINIC | Facility: CLINIC | Age: 63
End: 2023-03-17

## 2023-03-17 VITALS
RESPIRATION RATE: 16 BRPM | DIASTOLIC BLOOD PRESSURE: 83 MMHG | SYSTOLIC BLOOD PRESSURE: 136 MMHG | HEIGHT: 64 IN | WEIGHT: 173 LBS | HEART RATE: 97 BPM | BODY MASS INDEX: 29.53 KG/M2

## 2023-03-17 DIAGNOSIS — M25.511 ACUTE PAIN OF RIGHT SHOULDER: Primary | ICD-10-CM

## 2023-03-17 DIAGNOSIS — R21 RASH IN ADULT: ICD-10-CM

## 2023-03-17 DIAGNOSIS — M25.511 ACUTE PAIN OF RIGHT SHOULDER: ICD-10-CM

## 2023-03-17 PROCEDURE — 3075F SYST BP GE 130 - 139MM HG: CPT | Performed by: NURSE PRACTITIONER

## 2023-03-17 PROCEDURE — 3008F BODY MASS INDEX DOCD: CPT | Performed by: NURSE PRACTITIONER

## 2023-03-17 PROCEDURE — 73030 X-RAY EXAM OF SHOULDER: CPT | Performed by: NURSE PRACTITIONER

## 2023-03-17 PROCEDURE — 99214 OFFICE O/P EST MOD 30 MIN: CPT | Performed by: NURSE PRACTITIONER

## 2023-03-17 PROCEDURE — 3079F DIAST BP 80-89 MM HG: CPT | Performed by: NURSE PRACTITIONER

## 2023-03-17 NOTE — PATIENT INSTRUCTIONS
Take meloxicam in the morning     You can take tylenol as needed for pain     Ok to use heat therapy for discomfort    You can also use Biofreeze for discomfort     Make an appointment with dermatology

## 2023-03-19 ENCOUNTER — PATIENT MESSAGE (OUTPATIENT)
Dept: FAMILY MEDICINE CLINIC | Facility: CLINIC | Age: 63
End: 2023-03-19

## 2023-03-19 DIAGNOSIS — M25.511 RIGHT SHOULDER PAIN, UNSPECIFIED CHRONICITY: Primary | ICD-10-CM

## 2023-03-22 ENCOUNTER — PATIENT MESSAGE (OUTPATIENT)
Dept: INTERNAL MEDICINE CLINIC | Facility: CLINIC | Age: 63
End: 2023-03-22

## 2023-03-23 NOTE — TELEPHONE ENCOUNTER
From: Manisha West  To: DAVE Anthony  Sent: 3/22/2023 12:17 PM CDT  Subject: Question regarding XR SHOULDER, COMPLETE (MIN 2 VIEWS), RIGHT (CPT=73030)    How would I get a copy of my shoulder X-rays to bring to Dr. Teresa Davalos appointment on April 6th? Dr. Nicholas Blanca has treated me for this shoulder pain in the past or would she be able to see [facility]?  Maty Collazo

## 2023-03-24 ENCOUNTER — TELEPHONE (OUTPATIENT)
Facility: CLINIC | Age: 63
End: 2023-03-24

## 2023-03-24 NOTE — TELEPHONE ENCOUNTER
Results letter mailed to patient per   Colon recall entered for repeat in 3 yrs, 2/28/2026  Colon done in 2/28/2023  HM Updated and Patient Outreach was placed for Colon recall. Naida Watson MD  P Em Gi Clinical Staff  GI RNs - 1.  Please print and mail this letter to patient; 2. Recall for colonoscopy exam in 3 years

## 2023-05-15 RX ORDER — ATORVASTATIN CALCIUM 40 MG/1
40 TABLET, FILM COATED ORAL NIGHTLY
Qty: 90 TABLET | Refills: 3 | Status: SHIPPED | OUTPATIENT
Start: 2023-05-15

## 2023-05-16 NOTE — TELEPHONE ENCOUNTER
Refill passed per [facility] Anneliese protocol.      Requested Prescriptions   Pending Prescriptions Disp Refills    ATORVASTATIN 40 MG Oral Tab [Pharmacy Med Name: ATORVASTATIN 40MG TABLETS] 90 tablet 1     Sig: TAKE 1 TABLET(40 MG) BY MOUTH EVERY NIGHT       Cholesterol Medication Protocol Passed - 5/15/2023  5:58 AM        Passed - ALT in past 12 months        Passed - LDL in past 12 months        Passed - Last ALT < 80     Lab Results   Component Value Date    ALT 34 (H) 11/01/2022             Passed - Last LDL < 130     Lab Results   Component Value Date    LDL 70 09/08/2022               Passed - In person appointment or virtual visit in the past 12 mos or appointment in next 3 mos     Recent Outpatient Visits              1 month ago [facility] of right [facility]    Office Visit    3 months ago Primary osteoarthritis of both knees    Duyen Portillo MD    Office Visit    5 months ago Degenerative tear of lateral meniscus of left knee    [address], [address] [facility],[address], Keke Man MD    Office Visit    6 months ago Preoperative general physical examination    [facility], [place], Fariba Moore MD    Office Visit    7 months ago Rash in adult    [address], [address] [facility], [place], [address] [place] [address] Outpatient Visits              1 month ago Acute pain of right shoulder    [address], [address] [facility]    Office Visit    3 months ago Primary osteoarthritis of both Dorrene Sit, Rajan Desouza MD    Office Visit    5 months ago Degenerative tear of lateral meniscus of left knee    [address], [address] [facility],[address], [place], Nisa Rodriguez MD    Office Visit    6 months ago Preoperative general physical examination    Sulma Solomon, [facility] MD Britany    Office Visit    7 months ago Rash in adult    [address], [address] [facility], [address]    Office Visit

## 2023-06-21 ENCOUNTER — OFFICE VISIT (OUTPATIENT)
Dept: FAMILY MEDICINE CLINIC | Facility: CLINIC | Age: 63
End: 2023-06-21

## 2023-06-21 VITALS
BODY MASS INDEX: 30 KG/M2 | TEMPERATURE: 98 F | DIASTOLIC BLOOD PRESSURE: 78 MMHG | HEART RATE: 97 BPM | WEIGHT: 176.38 LBS | SYSTOLIC BLOOD PRESSURE: 116 MMHG

## 2023-06-21 DIAGNOSIS — M54.12 CERVICAL RADICULOPATHY: ICD-10-CM

## 2023-06-21 DIAGNOSIS — I10 ESSENTIAL HYPERTENSION: ICD-10-CM

## 2023-06-21 DIAGNOSIS — L30.1 DYSHIDROTIC ECZEMA: Primary | ICD-10-CM

## 2023-06-21 PROCEDURE — 99213 OFFICE O/P EST LOW 20 MIN: CPT | Performed by: FAMILY MEDICINE

## 2023-06-21 PROCEDURE — 3074F SYST BP LT 130 MM HG: CPT | Performed by: FAMILY MEDICINE

## 2023-06-21 PROCEDURE — 3078F DIAST BP <80 MM HG: CPT | Performed by: FAMILY MEDICINE

## 2023-07-12 RX ORDER — LISINOPRIL 20 MG/1
20 TABLET ORAL DAILY
Qty: 90 TABLET | Refills: 3 | Status: SHIPPED | OUTPATIENT
Start: 2023-07-12

## 2023-07-12 NOTE — TELEPHONE ENCOUNTER
Please review refill protocol failed/ no protocol  Requested Prescriptions   Pending Prescriptions Disp Refills    LISINOPRIL 20 MG Oral Tab [Pharmacy Med Name: LISINOPRIL 20MG TABLETS] 90 tablet 3     Sig: TAKE 1 TABLET(20 MG) BY MOUTH DAILY       Hypertensive Medications Protocol Failed - 7/11/2023  3:12 AM        Failed - CMP or BMP in past 6 months     No results found for this or any previous visit (from the past 4392 hour(s)).             Passed - In person appointment in the past 12 or next 3 months     Recent Outpatient Visits              2 weeks ago Raisa Latif Ozzie Countess, MD    Office Visit    3 months ago Acute pain of right shoulder    [address] [facility] [place], [facility]    Office Visit    5 months ago Primary osteoarthritis of both knees    Trevon Burns MD    Office Visit    7 months ago Degenerative tear of lateral meniscus of left knee    [employer], [address] [facility],[address], Leeanne Mcbride MD    Office Visit    8 months ago Preoperative general physical examination    Raisa Ocampo Ozzie Countess, MD    Office Visit                      Passed - Last BP reading less than 140/90     BP Readings from Last 1 Encounters:  06/21/23 : 116/78              Passed - In person appointment or virtual visit in the past 6 months     Recent Outpatient Visits              2 weeks ago Raisa Latif Ozzie Countess, MD    Office Visit    3 months ago Acute pain of right shoulder    [employer], [address] [facility]    Office Visit    5 months ago Primary osteoarthritis of both Cali Fuchs MD    Office Visit    7 months ago Degenerative tear of lateral meniscus of left knee    [address], Cynthia Villarreal MD    Office Visit    8 months ago Preoperative general physical examination    [address], [address], [address] [facility] MD Britany    Office Visit                      Passed - [facility] or GFRNAA > 50     GFR Evaluation

## 2023-07-13 RX ORDER — LISINOPRIL 20 MG/1
20 TABLET ORAL DAILY
Qty: 90 TABLET | Refills: 3 | OUTPATIENT
Start: 2023-07-13

## 2023-07-25 ENCOUNTER — PATIENT MESSAGE (OUTPATIENT)
Dept: FAMILY MEDICINE CLINIC | Facility: CLINIC | Age: 63
End: 2023-07-25

## 2023-07-25 LAB — AMB EXT COVID-19 RESULT: DETECTED

## 2023-07-26 ENCOUNTER — NURSE TRIAGE (OUTPATIENT)
Dept: FAMILY MEDICINE CLINIC | Facility: CLINIC | Age: 63
End: 2023-07-26

## 2023-07-27 NOTE — TELEPHONE ENCOUNTER
Action Requested: Summary for Provider     []  Critical Lab, Recommendations Needed  [] Need Additional Advice  []   FYI    []   Need Orders  [] Need Medications Sent to Pharmacy  []  Other     SUMMARY: Per Protocol disposition advised on home care and OTC medications to help alleviate sinus headache symptoms. Encouraged patient to stay hydrated and eat regular meals and stay in the air condition. Patient verbalized understanding and agrees to plan. Reason for call: Covid  Onset: 23 tested positive on 23    Patient (name and  verified) c/o headache and sinus congestion. Denies fever, cough, shortness of breath, chest pain. States is taking tylenol for the pain.       Reason for Disposition   [1] COVID-19 infection suspected by caller or triager AND [2] mild symptoms (cough, fever, or others) AND [3] negative COVID-19 rapid test    Protocols used: Coronavirus (COVID-19) Diagnosed or [id number]-VD

## 2023-08-02 RX ORDER — LORATADINE 10 MG/1
TABLET ORAL
Qty: 30 TABLET | Refills: 5 | OUTPATIENT
Start: 2023-08-02

## 2023-08-23 RX ORDER — MONTELUKAST SODIUM 10 MG/1
TABLET ORAL
Qty: 90 TABLET | Refills: 3 | OUTPATIENT
Start: 2023-08-23

## 2023-08-26 RX ORDER — MELOXICAM 15 MG/1
15 TABLET ORAL DAILY
Qty: 90 TABLET | Refills: 3 | Status: SHIPPED | OUTPATIENT
Start: 2023-08-26

## 2023-08-26 NOTE — TELEPHONE ENCOUNTER
Refill passed per [facility], [facility] protocol  Medication last prescribed by Dr. Alejandra Sin on 9/8/22. please advise if ok to refill.     Medication pended for your review and approval.     Requested Prescriptions   Pending Prescriptions Disp Refills    Meloxicam 15 MG Oral Tab  0       Non-Narcotic Pain Medication Protocol Passed - 8/25/2023  5:24 PM        Passed - In person appointment or virtual visit in the past 6 mos or appointment in next 3 mos     Recent Outpatient Visits              2 months ago [address], Michael Mccall MD    Office Visit    5 months ago Acute pain of right shoulder    [facility], [address], [place], Gonzalezoscar Payton, APRN    Office Visit    6 months ago Primary osteoarthritis of both knees    Milind Sosa MD    Office Visit    8 months ago Degenerative tear of lateral meniscus of left knee    [facility], [address] [facility],[address], Iker Joseph MD    Office Visit    9 months ago Preoperative general physical examination    [address] [facility], [address] [facility] MD Britany    Office Visit          Future Appointments         Provider Department Appt Notes    In 2 months Paige Schroeder MD [address] [facility], [facility] Arthritis pain

## 2023-09-26 NOTE — TELEPHONE ENCOUNTER
Called and spoke to patient regarding Montelukast refill. Scheduled appointment for patient to see Dr. Anne Marie Wilson.

## 2023-09-26 NOTE — TELEPHONE ENCOUNTER
This refill request is being sent to the provider for the following reason:  []Patient has not had an appointment within the past 12 months but has made an appointment on: ___  []Medication is not within protocol  []Patient did not complete follow up recommendations  [x]Other: ___  LOV was 5/31/2022. My chart message sent. Dr. Javier Taveras, please review and send if appropriate.  Thank you,

## 2023-09-27 RX ORDER — MONTELUKAST SODIUM 10 MG/1
10 TABLET ORAL NIGHTLY
Qty: 90 TABLET | Refills: 3 | Status: SHIPPED | OUTPATIENT
Start: 2023-09-27

## 2023-10-07 NOTE — TELEPHONE ENCOUNTER
cyclobenzaprine 5 MG Oral Tab, Take 1 tablet (5 mg total) by mouth nightly., Disp: 30 tablet, Rfl: 2

## 2023-10-09 ENCOUNTER — OFFICE VISIT (OUTPATIENT)
Dept: OTOLARYNGOLOGY | Facility: CLINIC | Age: 63
End: 2023-10-09

## 2023-10-09 DIAGNOSIS — H69.93 DYSFUNCTION OF BOTH EUSTACHIAN TUBES: Primary | ICD-10-CM

## 2023-10-09 PROCEDURE — 99213 OFFICE O/P EST LOW 20 MIN: CPT | Performed by: OTOLARYNGOLOGY

## 2023-10-09 RX ORDER — LORATADINE 10 MG/1
10 TABLET ORAL DAILY
COMMUNITY
Start: 2023-07-01

## 2023-10-09 RX ORDER — LORATADINE 10 MG/1
10 TABLET ORAL DAILY
Qty: 90 TABLET | Refills: 3 | Status: SHIPPED | OUTPATIENT
Start: 2023-10-09

## 2023-10-09 RX ORDER — CYCLOBENZAPRINE HCL 5 MG
5 TABLET ORAL NIGHTLY
Qty: 30 TABLET | Refills: 3 | Status: SHIPPED | OUTPATIENT
Start: 2023-10-09

## 2023-10-09 RX ORDER — CYCLOBENZAPRINE HCL 5 MG
5 TABLET ORAL NIGHTLY
Qty: 30 TABLET | Refills: 2 | OUTPATIENT
Start: 2023-10-09

## 2023-10-09 RX ORDER — MONTELUKAST SODIUM 10 MG/1
10 TABLET ORAL NIGHTLY
Qty: 90 TABLET | Refills: 3 | Status: SHIPPED | OUTPATIENT
Start: 2023-10-09

## 2023-10-09 NOTE — PROGRESS NOTES
Vlad Tripp is a 58year old female. Patient presents with: Follow - Up: Pt needs refills, reports bilateral ear popping often, reevaluating tmj. HISTORY OF PRESENT ILLNESS  She presents with a history of eustachian tube issues in the past.  No real history of recurrent ear infections. About 2 weeks ago she developed a cold and states that from that time she noticed decreased hearing on the left. She wears hearing aids bilaterally for known hearing loss bilaterally. Hearing is down on the side. Thought to have an infection treated with antibiotics x2 with no improvement in her hearing. No pain or fevers or other associated signs or symptoms of infection. Currently using Flonase and Sudafed 120 mg twice daily. Feels no real change in her hearing. No other signs, symptoms or complaints. Audiogram not performed in the office today. 12/17/21 recently treated for an acute ear infection on the left with antibiotics but still feels no improvement. She does have a history of eustachian tube dysfunction issues but also complaining of pain along the side of her neck extending from the ear. TMJ issues? She does clench throughout the day but she is unsure if she grinds but has had TMJ issues in the past.  Audiogram was performed based on her history and compared to previous test from 2015 demonstrating slight worsening of her pure-tone thresholds which are symmetric in change over the past 6 years with moderate hearing loss noted all frequencies bilaterally. Normal tympanograms but middle ear pressures are slightly lower on the right compared to the left.   Left side is -111.      1/18/22 last visit started on Singulair loratadine and Astelin nasal spray as well as cyclobenzaprine and meloxicam.  She has had complete resolution of her ear pain with use of the anti-inflammatory muscle relaxant but still continues to have ear pressure and the need to pop her ear on occasion despite the allergy medications. When she does pop her ear the pressure resolves but there are times where she feels that she cannot pop her ear. Here to discuss further management. Previous audiogram at her last visit demonstrated normal tympanograms but increase middle ear pressures with a very -111 noted on the left. 5/31/22 having ear pressure on the left once again as well as pain. She unfortunately had COVID in early May feels that it may be related to something going on in her ear. She is able to pop both ears without difficulty at this time but does not feel that the pain resolves. She does have a history of grinding and clenching and she has used meloxicam and cyclobenzaprine in the past.  No other signs, symptoms or complaints. 10/9/23 I last saw her over a year ago she has had issues with TMJ in the past and has used meloxicam and cyclobenzaprine. Is requesting some refills on cyclobenzaprine as it does help when she has her pain and discomfort. Has not followed up with a dentist to get a bite guard. Also has chronic issues with nasal congestion and has an animal that she is allergic to at home. Currently using Singulair loratadine does not really care to use nasal sprays complains primarily of popping in her ears that resolves when she performs a Valsalva.       Social History    Socioeconomic History      Marital status:     Tobacco Use      Smoking status: Never      Smokeless tobacco: Never    Vaping Use      Vaping Use: Never used    Substance and Sexual Activity      Alcohol use: Yes        Comment: rarely      Drug use: No    Other Topics      Concerns:        Caffeine Concern: Yes          coffee, > 6 cups daily        Exercise: Yes          walking        Pt has a pacemaker: No        Pt has a defibrillator: No        Reaction to local anesthetic: No      Family History   Problem Relation Age of Onset    Heart Disorder Father     Hypertension Father     Heart Disease Father         CAD Diabetes Mother     Hypertension Mother     Heart Disorder Mother     Asthma Mother     Other (Other) Mother     Thyroid Disorder Sister         hypothyroidism    Other (sarcoidosis) Sister     Cancer Maternal Aunt         pancreatic cancer    Psychiatric Son     Stroke Maternal Grandmother         cerebrovascular accident    Other (CVA) Maternal Grandmother     Cancer Maternal Grandfather         bone cancer, pancreatic cancer    Ovarian Cancer Paternal Grandmother     Colon Cancer Paternal Grandfather     Breast Cancer Paternal Cousin Female 59    Arrhythmia Neg         sudden cardiac death, family h/o       Past Medical History:   Diagnosis Date    Cubital tunnel syndrome, right/SX DATE 5-8-17, CPT 18511, W/GLYNN, GLOBAL EXP 8-6-17 05/04/2016    Degenerative TFCC tear, right 05/04/2016    Endometriosis     Hearing impairment     BILATERAL HEARING AIDS    High blood pressure     High cholesterol     Lumbar disc herniation 2013 2013 discectomy    Migraines     Neuropathy     Osteoarthritis     Primary osteoarthritis of first carpometacarpal joint of right hand 05/04/2016    Rotator cuff syndrome, right 02/09/2017    Sx.7/18/16, CPT.98165, R Mid Trigger Finger Release, w/, Global Exp.10/16/16 05/04/2016    Trigger index finger of right hand 06/22/2016    Visual impairment        Past Surgical History:   Procedure Laterality Date    BACK SURGERY      BUNIONECTOMY Right 09/2021    CARPAL TUNNEL RELEASE  2009    [LATERALITY NOT STATED]    COLONOSCOPY      COLONOSCOPY N/A 11/05/2019    Procedure: COLONOSCOPY;  Surgeon: Bryce Hayward MD;  Location: [address] [place] [address] ENDOSCOPY    COLONOSCOPY N/A 2/28/2023    Procedure: COLONOSCOPY;  Surgeon: Bryce Hayward MD;  Location: [address] [place] [address] ENDOSCOPY    COLONOSCOPY & POLYPECTOMY  2011    colonoscopy with polypectomy, repat in 5 yr; colonic polyps, tubular adenoma    KNEE ARTHROSCOPY Left 12/02/2022    partial medial and lateral meniscectomy    OTHER Right MIDDLE TRIGGER FINGER RELEASE    OTHER Right     ELBOW ULNAR RELEASE    PERCUTANEOUS LUMBAR DISKECTOMY  2013    discectomy; Lumbar disc herniation         REVIEW OF SYSTEMS    System Neg/Pos Details   Constitutional Negative Fatigue, fever and weight loss. ENMT Negative Drooling. Eyes Negative Blurred vision and vision changes. Respiratory Negative Dyspnea and wheezing. Cardio Negative Chest pain, irregular heartbeat/palpitations and syncope. GI Negative Abdominal pain and diarrhea. Endocrine Negative Cold intolerance and heat intolerance. Neuro Negative Tremors. Psych Negative Anxiety and depression. Integumentary Negative Frequent skin infections, pigment change and rash. Hema/Lymph Negative Easy bleeding and easy bruising. PHYSICAL EXAM    There were no vitals taken for this visit. Constitutional Normal Overall appearance - Normal.   Psychiatric Normal Orientation - Oriented to time, place, person & situation. Appropriate mood and affect. Neck Exam Normal Inspection - Normal. Palpation - Normal. Parotid gland - Normal. Thyroid gland - Normal.   Eyes Normal Conjunctiva - Right: Normal, Left: Normal. Pupil - Right: Normal, Left: Normal. Fundus - Right: Normal, Left: Normal.   Neurological Normal Memory - Normal. Cranial nerves - Cranial nerves II through XII grossly intact. Head/Face Normal Facial features - Normal. Eyebrows - Normal. Skull - Normal.        Nasopharynx Normal External nose - Normal. Lips/teeth/gums - Normal. Tonsils - Normal. Oropharynx - Normal.   Ears Normal Inspection - Right: Normal, Left: Normal. Canal - Right: Normal, Left: Normal. TM - Right: Normal, Left: Normal.   Skin Normal Inspection - Normal.        Lymph Detail Normal Submental. Submandibular. Anterior cervical. Posterior cervical. Supraclavicular.         Nose/Mouth/Throat Normal External nose - Normal. Lips/teeth/gums - Normal. Tonsils - Normal. Oropharynx - Normal.   Nose/Mouth/Throat Normal Nares - Right: Normal Left: Normal. Septum deviated to the left turbinates - Right: Normal, Left: Normal.       Current Outpatient Medications:     loratadine 10 MG Oral Tab, Take 1 tablet (10 mg total) by mouth daily. , Disp: , Rfl:     montelukast 10 MG Oral Tab, Take 1 tablet (10 mg total) by mouth nightly., Disp: 90 tablet, Rfl: 3    Meloxicam 15 MG Oral Tab, Take 1 tablet (15 mg total) by mouth daily. , Disp: 90 tablet, Rfl: 3    lisinopril 20 MG Oral Tab, Take 1 tablet (20 mg total) by mouth daily. , Disp: 90 tablet, Rfl: 3    cyclobenzaprine 5 MG Oral Tab, Take 1 tablet (5 mg total) by mouth nightly., Disp: 30 tablet, Rfl: 2    atorvastatin 40 MG Oral Tab, Take 1 tablet (40 mg total) by mouth nightly., Disp: 90 tablet, Rfl: 3    triamcinolone 0.1 % External Ointment, Apply to affected area twice a day, Disp: 30 g, Rfl: 0    SPIRONOLACTONE 50 MG Oral Tab, TAKE 1 TABLET(50 MG) BY MOUTH DAILY, Disp: 90 tablet, Rfl: 3    Multiple Vitamins-Minerals (MULTI-VITAMIN GUMMIES) Oral Chew Tab, , Disp: , Rfl:     Vitamin D3 (VITAMIN D3) 1000 UNITS Oral Tab, Take 1 tablet (1,000 Units total) by mouth daily. , Disp: , Rfl:   ASSESSMENT AND PLAN    1. Dysfunction of both eustachian tubes  Dysfunction of the eustachian tubes most likely secondary to allergic rhinitis. Continue Claritin Singulair and consider use of fluticasone as needed for eustachian tube issues. In addition I will call in some cyclobenzaprine but I did recommend that she follow-up with a dentist to look into getting a bite guard if necessary. Return to see me in 1 year for allergy meds or in 6 months for her cyclobenzaprine        This note was prepared using [facility] [employer] voice recognition dictation software. As a result errors may occur. When identified these errors have been corrected. While every attempt is made to correct errors during dictation discrepancies may still exist    Michael Alva MD    10/9/2023    3:57 PM

## 2023-10-09 NOTE — TELEPHONE ENCOUNTER
Please review. Protocol failed / Has no protocol. Routing as Dr. Edil Guillen is out of office. Requested Prescriptions   Pending Prescriptions Disp Refills    cyclobenzaprine 5 MG Oral Tab 30 tablet 2     Sig: Take 1 tablet (5 mg total) by mouth nightly.        There is no refill protocol information for this order        Future Appointments         Provider Department Appt Notes    Today Jackson Padilla MD [address] [place] [address]     In 1 month Bea Rao MD [address] [place] [address] Arthritis pain last px 9/8/2022           Recent Outpatient Visits              3 months ago [address], [place] Bea Rao MD    Office Visit    6 months ago Acute pain of right shoulder    [address], [address] [place], Jami Harkins, DAVE    Office Visit    8 months ago Primary osteoarthritis of both Vernia Pablo, Giacomo Bowman MD    Office Visit    10 months ago Degenerative tear of lateral meniscus of left knee    [address] [place] [address], Keira Johnson MD    Office Visit    11 months ago Preoperative general physical examination    [address], [address], Julio Cesar Chacon MD    Office Visit

## 2023-11-06 ENCOUNTER — OFFICE VISIT (OUTPATIENT)
Dept: FAMILY MEDICINE CLINIC | Facility: CLINIC | Age: 63
End: 2023-11-06
Payer: COMMERCIAL

## 2023-11-06 ENCOUNTER — LAB ENCOUNTER (OUTPATIENT)
Dept: LAB | Age: 63
End: 2023-11-06
Attending: FAMILY MEDICINE
Payer: COMMERCIAL

## 2023-11-06 VITALS
DIASTOLIC BLOOD PRESSURE: 78 MMHG | HEIGHT: 64.17 IN | OXYGEN SATURATION: 98 % | RESPIRATION RATE: 16 BRPM | WEIGHT: 182 LBS | TEMPERATURE: 98 F | BODY MASS INDEX: 31.07 KG/M2 | SYSTOLIC BLOOD PRESSURE: 129 MMHG | HEART RATE: 80 BPM

## 2023-11-06 DIAGNOSIS — M17.12 OSTEOARTHRITIS OF LEFT KNEE, UNSPECIFIED OSTEOARTHRITIS TYPE: ICD-10-CM

## 2023-11-06 DIAGNOSIS — E78.5 HYPERLIPIDEMIA, UNSPECIFIED HYPERLIPIDEMIA TYPE: ICD-10-CM

## 2023-11-06 DIAGNOSIS — Z00.00 ROUTINE PHYSICAL EXAMINATION: Primary | ICD-10-CM

## 2023-11-06 DIAGNOSIS — L30.1 DYSHIDROTIC ECZEMA: ICD-10-CM

## 2023-11-06 DIAGNOSIS — I10 ESSENTIAL HYPERTENSION: ICD-10-CM

## 2023-11-06 DIAGNOSIS — Z12.31 ENCOUNTER FOR SCREENING MAMMOGRAM FOR BREAST CANCER: ICD-10-CM

## 2023-11-06 PROCEDURE — 3008F BODY MASS INDEX DOCD: CPT | Performed by: FAMILY MEDICINE

## 2023-11-06 PROCEDURE — 99396 PREV VISIT EST AGE 40-64: CPT | Performed by: FAMILY MEDICINE

## 2023-11-06 PROCEDURE — 90471 IMMUNIZATION ADMIN: CPT | Performed by: FAMILY MEDICINE

## 2023-11-06 PROCEDURE — 3074F SYST BP LT 130 MM HG: CPT | Performed by: FAMILY MEDICINE

## 2023-11-06 PROCEDURE — 90686 IIV4 VACC NO PRSV 0.5 ML IM: CPT | Performed by: FAMILY MEDICINE

## 2023-11-06 PROCEDURE — 90679 RSV VACC PREF RECOMB ADJT IM: CPT | Performed by: FAMILY MEDICINE

## 2023-11-06 PROCEDURE — 3078F DIAST BP <80 MM HG: CPT | Performed by: FAMILY MEDICINE

## 2023-11-06 PROCEDURE — 90472 IMMUNIZATION ADMIN EACH ADD: CPT | Performed by: FAMILY MEDICINE

## 2023-11-06 NOTE — PROGRESS NOTES
Subjective:   Patient ID: Sylvia Bernard is a 58year old female. Patient presents for routine physical and issues as below. History/Other:   Review of Systems   Constitutional: Negative. Respiratory: Negative. Cardiovascular: Negative. Gastrointestinal: Negative. Musculoskeletal:  Positive for arthralgias. Skin:         Finger skin splitting   Neurological: Negative. Current Outpatient Medications   Medication Sig Dispense Refill    montelukast 10 MG Oral Tab Take 1 tablet (10 mg total) by mouth nightly. 90 tablet 3    loratadine 10 MG Oral Tab Take 1 tablet (10 mg total) by mouth daily. 90 tablet 3    Meloxicam 15 MG Oral Tab Take 1 tablet (15 mg total) by mouth daily. 90 tablet 3    lisinopril 20 MG Oral Tab Take 1 tablet (20 mg total) by mouth daily. 90 tablet 3    cyclobenzaprine 5 MG Oral Tab Take 1 tablet (5 mg total) by mouth nightly. 30 tablet 2    atorvastatin 40 MG Oral Tab Take 1 tablet (40 mg total) by mouth nightly. 90 tablet 3    triamcinolone 0.1 % External Ointment Apply to affected area twice a day 30 g 0    SPIRONOLACTONE 50 MG Oral Tab TAKE 1 TABLET(50 MG) BY MOUTH DAILY 90 tablet 3    Multiple Vitamins-Minerals (MULTI-VITAMIN GUMMIES) Oral Chew Tab       Vitamin D3 (VITAMIN D3) 1000 UNITS Oral Tab Take 1 tablet (1,000 Units total) by mouth daily. Allergies:  Bronopol                SWELLING  Darvocet [Propoxyph*    RASH, SWELLING  Penicillins             RASH  Sulfa Antibiotics       RASH  Sulfanilamide           RASH  Mastisol Adhesive       RASH    Comment:Blisters from skin glue  Other                   OTHER (SEE COMMENTS)    Comment:Adhesive/suture glue -blisters    Objective:   Physical Exam  Constitutional:       Appearance: Normal appearance. Cardiovascular:      Rate and Rhythm: Normal rate and regular rhythm. Heart sounds: Normal heart sounds. Pulmonary:      Effort: Pulmonary effort is normal.      Breath sounds: Normal breath sounds. Comments: Breast-normal appearance, no masses  Abdominal:      Palpations: Abdomen is soft. There is no mass. Tenderness: There is no abdominal tenderness. Lymphadenopathy:      Cervical: No cervical adenopathy. Skin:     General: Skin is warm and dry. Neurological:      Mental Status: She is alert and oriented to person, place, and time. Assessment & Plan:   Routine physical examination  (primary encounter diagnosis)-patient is , adult children, grandchildren. Continuing to work in  center. Encourage regular exercise  Fasting labs done today  RSV vaccine and flu vaccines given  Had COVID 7/2023. Recommend updated COVID-vaccine in 1 month. Colon cancer screening and Pap smear up-to-date    Encounter for screening mammogram for breast cancer-order given    Essential hypertension-blood pressure controlled on current medications. Monitor BMP    Hyperlipidemia, unspecified hyperlipidemia type-continuing atorvastatin, check labs as ordered    Dyshidrotic eczema-somewhat improved from previous visit but having skin adjacent to fingernail splitting. Recommend avoiding washing/soaps, continue triamcinolone ointment as needed, try fingercots. Osteoarthritis of left knee, unspecified osteoarthritis type-diffuse osteoarthritis. Using meloxicam daily. Recommend using additional arthritis strength Tylenol on a scheduled basis if helpful. Orders Placed This Encounter      Comp Metabolic Panel (14)      Lipid Panel      CBC, Platelet;  No Differential      FLULAVAL INFLUENZA VACCINE QUAD PRESERVATIVE FREE 0.5 ML      RSV Vaccine - Arexvy 0.5mL ages 61+ [36]      Meds This Visit:  Requested Prescriptions      No prescriptions requested or ordered in this encounter       Imaging & Referrals:  FLULAVAL INFLUENZA VACCINE QUAD PRESERVATIVE FREE 0.5 ML  RSV VACC PREF RECOMB ADJT IM  [facility] 2D+3D SCREENING BILAT (CPT=77067/12486)

## 2023-11-07 LAB
ALBUMIN/GLOBULIN RATIO: 2.2 (CALC) (ref 1–2.5)
ALBUMIN: 5 G/DL (ref 3.6–5.1)
ALKALINE PHOSPHATASE: 138 U/L (ref 37–153)
ALT: 23 U/L (ref 6–29)
AST: 19 U/L (ref 10–35)
BILIRUBIN, TOTAL: 0.8 MG/DL (ref 0.2–1.2)
BUN: 12 MG/DL (ref 7–25)
CALCIUM: 10.3 MG/DL (ref 8.6–10.4)
CARBON DIOXIDE: 27 MMOL/L (ref 20–32)
CHLORIDE: 96 MMOL/L (ref 98–110)
CHOL/HDLC RATIO: 2.4 (CALC)
CHOLESTEROL, TOTAL: 178 MG/DL
CREATININE: 0.53 MG/DL (ref 0.5–1.05)
EGFR: 103 ML/MIN/1.73M2
GLOBULIN: 2.3 G/DL (CALC) (ref 1.9–3.7)
GLUCOSE: 84 MG/DL (ref 65–99)
HDL CHOLESTEROL: 75 MG/DL
HEMATOCRIT: 42 % (ref 35–45)
HEMOGLOBIN: 14.7 G/DL (ref 11.7–15.5)
LDL-CHOLESTEROL: 79 MG/DL (CALC)
MCH: 31.7 PG (ref 27–33)
MCHC: 35 G/DL (ref 32–36)
MCV: 90.5 FL (ref 80–100)
MPV: 9.6 FL (ref 7.5–12.5)
NON-HDL CHOLESTEROL: 103 MG/DL (CALC)
PLATELET COUNT: 312 THOUSAND/UL (ref 140–400)
POTASSIUM: 4.4 MMOL/L (ref 3.5–5.3)
PROTEIN, TOTAL: 7.3 G/DL (ref 6.1–8.1)
RDW: 12.3 % (ref 11–15)
RED BLOOD CELL COUNT: 4.64 MILLION/UL (ref 3.8–5.1)
SODIUM: 133 MMOL/L (ref 135–146)
TRIGLYCERIDES: 138 MG/DL
WHITE BLOOD CELL COUNT: 6.1 THOUSAND/UL (ref 3.8–10.8)

## 2023-12-07 ENCOUNTER — HOSPITAL ENCOUNTER (OUTPATIENT)
Dept: MAMMOGRAPHY | Age: 63
Discharge: HOME | End: 2023-12-07
Attending: FAMILY MEDICINE
Payer: COMMERCIAL

## 2023-12-07 DIAGNOSIS — Z12.31 ENCOUNTER FOR SCREENING MAMMOGRAM FOR BREAST CANCER: ICD-10-CM

## 2023-12-07 PROCEDURE — 77067 SCR MAMMO BI INCL CAD: CPT | Performed by: FAMILY MEDICINE

## 2023-12-07 PROCEDURE — 77063 BREAST TOMOSYNTHESIS BI: CPT | Performed by: FAMILY MEDICINE

## 2023-12-12 ENCOUNTER — HOSPITAL ENCOUNTER (OUTPATIENT)
Dept: MAMMOGRAPHY | Facility: HOSPITAL | Age: 63
Discharge: HOME | End: 2023-12-12
Attending: FAMILY MEDICINE
Payer: COMMERCIAL

## 2023-12-12 ENCOUNTER — HOSPITAL ENCOUNTER (OUTPATIENT)
Dept: ULTRASOUND IMAGING | Facility: HOSPITAL | Age: 63
Discharge: HOME | End: 2023-12-12
Attending: FAMILY MEDICINE
Payer: COMMERCIAL

## 2023-12-12 DIAGNOSIS — R92.8 ABNORMAL MAMMOGRAM: ICD-10-CM

## 2023-12-12 PROCEDURE — 77061 BREAST TOMOSYNTHESIS UNI: CPT | Performed by: FAMILY MEDICINE

## 2023-12-12 PROCEDURE — 76642 ULTRASOUND BREAST LIMITED: CPT | Performed by: FAMILY MEDICINE

## 2023-12-12 PROCEDURE — 77065 DX MAMMO INCL CAD UNI: CPT | Performed by: FAMILY MEDICINE

## 2023-12-20 ENCOUNTER — OFFICE VISIT (OUTPATIENT)
Dept: FAMILY MEDICINE CLINIC | Facility: CLINIC | Age: 63
End: 2023-12-20
Payer: COMMERCIAL

## 2023-12-20 VITALS
RESPIRATION RATE: 16 BRPM | TEMPERATURE: 99 F | BODY MASS INDEX: 31.58 KG/M2 | HEIGHT: 64 IN | OXYGEN SATURATION: 98 % | HEART RATE: 102 BPM | SYSTOLIC BLOOD PRESSURE: 128 MMHG | WEIGHT: 185 LBS | DIASTOLIC BLOOD PRESSURE: 86 MMHG

## 2023-12-20 DIAGNOSIS — J06.9 VIRAL URI WITH COUGH: Primary | ICD-10-CM

## 2023-12-20 DIAGNOSIS — Z20.828 RSV EXPOSURE: ICD-10-CM

## 2023-12-20 PROCEDURE — 3008F BODY MASS INDEX DOCD: CPT | Performed by: NURSE PRACTITIONER

## 2023-12-20 PROCEDURE — 99213 OFFICE O/P EST LOW 20 MIN: CPT | Performed by: NURSE PRACTITIONER

## 2023-12-20 PROCEDURE — 3079F DIAST BP 80-89 MM HG: CPT | Performed by: NURSE PRACTITIONER

## 2023-12-20 PROCEDURE — 3074F SYST BP LT 130 MM HG: CPT | Performed by: NURSE PRACTITIONER

## 2023-12-20 PROCEDURE — 87637 SARSCOV2&INF A&B&RSV AMP PRB: CPT | Performed by: NURSE PRACTITIONER

## 2023-12-20 RX ORDER — BENZONATATE 200 MG/1
200 CAPSULE ORAL 3 TIMES DAILY PRN
Qty: 30 CAPSULE | Refills: 0 | Status: SHIPPED | OUTPATIENT
Start: 2023-12-20

## 2023-12-20 RX ORDER — ALBUTEROL SULFATE 90 UG/1
1 AEROSOL, METERED RESPIRATORY (INHALATION) EVERY 4 HOURS PRN
Qty: 1 EACH | Refills: 0 | Status: SHIPPED | OUTPATIENT
Start: 2023-12-20

## 2023-12-21 LAB
FLUAV + FLUBV RNA SPEC NAA+PROBE: NOT DETECTED
FLUAV + FLUBV RNA SPEC NAA+PROBE: NOT DETECTED
RSV RNA SPEC NAA+PROBE: NOT DETECTED
SARS-COV-2 RNA RESP QL NAA+PROBE: NOT DETECTED

## 2024-01-14 DIAGNOSIS — J06.9 VIRAL URI WITH COUGH: ICD-10-CM

## 2024-01-15 RX ORDER — ALBUTEROL SULFATE 90 UG/1
1 AEROSOL, METERED RESPIRATORY (INHALATION) EVERY 4 HOURS PRN
Qty: 8.5 G | Refills: 0 | OUTPATIENT
Start: 2024-01-15

## 2024-02-13 NOTE — TELEPHONE ENCOUNTER
This refill request is being sent to the provider for the following reason:  []Patient has not had an appointment within the past 12 months but has made an appointment on: ___  [x]Medication is not within protocol  [x]Patient did not complete follow up recommendations  []Other: ___

## 2024-02-19 RX ORDER — CYCLOBENZAPRINE HCL 5 MG
5 TABLET ORAL NIGHTLY
Qty: 30 TABLET | Refills: 2 | Status: SHIPPED | OUTPATIENT
Start: 2024-02-19

## 2024-03-18 NOTE — TELEPHONE ENCOUNTER
Refill request    Current Outpatient Medications   Medication Sig Dispense Refill                                                                   SPIRONOLACTONE 50 MG Oral Tab TAKE 1 TABLET(50 MG) BY MOUTH DAILY 90 tablet 3

## 2024-03-20 RX ORDER — SPIRONOLACTONE 50 MG/1
50 TABLET, FILM COATED ORAL DAILY
Qty: 90 TABLET | Refills: 3 | Status: SHIPPED | OUTPATIENT
Start: 2024-03-20

## 2024-03-20 NOTE — TELEPHONE ENCOUNTER
Refill passed per [facility] protocol.    Please review pended refill request as unable to refill due to high/very high interaction warning copied here:        Very High  Drug-Drug: spironolactone and lisinoprilHyperkalemia, possibly with cardiac arrhythmias or arrest, may occur with the combination of potassium-sparing diuretics and ACE inhibitors. Serum potassium concentrations and renal function should be monitored. Patients with renal impairment, diabetes, older age, severe heart failure, and risk for dehydration may be at greater risk.  Details  Requested Prescriptions   Pending Prescriptions Disp Refills    spironolactone 50 MG Oral Tab 90 tablet 3     Sig: Take 1 tablet (50 mg total) by mouth daily.       Hypertension Medications Protocol Passed - 3/19/2024  8:54 AM        Passed - CMP or BMP in past 12 months        Passed - Last BP reading less than 140/90     BP Readings from Last 1 Encounters:   12/20/23 128/86               Passed - In person appointment or virtual visit in the past 12 mos or appointment in next 3 mos     Recent Outpatient Visits              3 months ago Viral URI with cough    [facility], [facility] Odilia Maloney APRN    Office Visit    4 months ago [facility]    [facility] Visit    5 months ago Dysfunction of both eustachian tubes    [facility] Michael Meraz MD    Office Visit    9 months ago [facility]yshidrotic eczema    [facility]    [facility]    1 year [facility]    [facility]                      Passed - EGFRCR or GFRNAA > 50     GFR Evaluation  EGFRCR: 103 , resulted on 11/6/2023             Recent Outpatient Visits              3  months ago Viral URI with cough    [facility], [facility], Odilia Arredondo APRN    Office Visit    4 months ago Routine physical examination    [facility]    [facility]    5 months ago Dysfunction of both eustachian tubes    [facility], [facility] Michael Meraz MD    Office Visit    9 months ago [facility]hidrotic eczema    [facility]    [facility]    1 year ago Acute pain of right shoulder    [facility] [place] Barbara Jaimes APRN    Office Visit

## 2024-05-08 RX ORDER — ATORVASTATIN CALCIUM 40 MG/1
40 TABLET, FILM COATED ORAL NIGHTLY
Qty: 90 TABLET | Refills: 3 | Status: SHIPPED | OUTPATIENT
Start: 2024-05-08

## 2024-05-08 NOTE — TELEPHONE ENCOUNTER
Refill Per Protocol     Requested Prescriptions   Pending Prescriptions Disp Refills    ATORVASTATIN 40 MG Oral Tab [Pharmacy Med Name: ATORVASTATIN 40MG TABLETS] 90 tablet 3     Sig: TAKE 1 TABLET(40 MG) BY MOUTH EVERY NIGHT       Cholesterol Medication Protocol Passed - 5/7/2024  5:52 AM        Passed - ALT < 80     Lab Results   Component Value Date    ALT 23 11/06/2023             Passed - ALT resulted within past year        Passed - Lipid panel within past 12 months     Lab Results   Component Value Date    CHOLEST 178 11/06/2023    TRIG 138 11/06/2023    HDL 75 11/06/2023    LDL 79 11/06/2023    TCHDLRATIO 2.4 11/06/2023    NONHDLC 103 11/06/2023             Passed - In person appointment or virtual visit in the past 12 mos or appointment in next 3 mos     Recent Outpatient Visits              4 months ago Viral URI with cough    [facility], Walk[facility]In [facility], Odilia Arredondo APRN    Office Visit    6 months ago [facility]    [facility]    7 months ago Dysfunction of both eustachian tubes    [facility] Michael Meraz MD    Office Visit    10 months ago Dyshidrotic eczema    [facility] [place]    Office Visit    1 year ago Acute pain of right shoulder    [facility] Barbara Jaimes APRN    Office Visit                               Recent Outpatient Visits              4 months ago Viral URI with cough    [facility]    Office Visit    6 months ago Routine physical examination    [facility] FelixNae MD    Office Visit    7 months ago Dysfunction of both eustachian tubes    [facility]  [place], [place] Michael Meraz MD    Office Visit    10 months ago Dyshidrotic eczema    [facility], [facility] Nae Washington MD    Office Visit    1 year ago Acute pain of right shoulder    [facility], [facility], Barbara Jaimes APRN    Office Visit

## 2024-05-20 RX ORDER — CYCLOBENZAPRINE HCL 5 MG
5 TABLET ORAL NIGHTLY
Qty: 30 TABLET | Refills: 2 | Status: SHIPPED | OUTPATIENT
Start: 2024-05-20

## 2024-05-20 NOTE — TELEPHONE ENCOUNTER
This refill request is being sent to the provider for the following reason:  []Patient has not had an appointment within the past 12 months but has made an appointment on: ___  []Medication is not within protocol -   [x]Patient did not complete follow up recommendations for Cyclobenzaprine  []Other: ___

## 2024-07-10 RX ORDER — LISINOPRIL 20 MG/1
20 TABLET ORAL DAILY
Qty: 90 TABLET | Refills: 0 | Status: SHIPPED | OUTPATIENT
Start: 2024-07-10

## 2024-07-10 NOTE — TELEPHONE ENCOUNTER
Refill passed per [facility] protocol.    Due for physical 11/2024    However, please advise on high alert:    t Warnings (1 unfiltered, 1 filtered)[]Show filtered (1)  Very High  Drug-Drug: spironolactone and lisinoprilHyperkalemia, possibly with cardiac arrhythmias or arrest, may occur with the combination of potassium-sparing diuretics and ACE inhibitors. Serum potassium concentrations and renal function should be monitored. Patients with renal impairment, diabetes, older age, severe heart failure, and risk for dehydration may be at greater risk.  Details    Requested Prescriptions   Pending Prescriptions Disp Refills    LISINOPRIL 20 MG Oral Tab [Pharmacy Med Name: LISINOPRIL 20MG TABLETS] 90 tablet 3     Sig: TAKE 1 TABLET(20 MG) BY MOUTH DAILY       Hypertension Medications Protocol Passed - 7/6/2024  5:53 AM        Passed - CMP or BMP in past 12 months        Passed - Last BP reading less than 140/90     BP Readings from Last 1 Encounters:   12/20/23 128/86               Passed - In person appointment or virtual visit in the past 12 mos or appointment in next 3 mos     Recent Outpatient Visits              6 months ago Viral URI with cough    [facility]    Office Visit    8 months ago Routine physical examination    [facility] Bec[place]Nae MD    Office Visit    9 months ago Dysfunction of both eustachian tubes    [facility] Michael Meraz MD    Office Visit    1 year ago Dyshidrotic eczema    [facility] Nae Washington MD    Office Visit    1 year ago Acute pain of right shoulder    [facility] Barbara Vilchis APRN    Office Visit                      Passed - EGFRCR or GFRNAA > 50     GFR Evaluation  EGFRCR: 103 , resulted on 11/6/2023              Recent Outpatient Visits              6 months ago Viral URI with cough    [facility], [facility]In Clinic, [facility], Odliia Arredondo APRN    Office Visit    8 months ago [facility]    [facility]    9 months ago Dysfunction of both eustachian tubes    [facility] Michael Meraz MD    Office Visit    1 year ago Dyshidrotic eczema    [facility]    [facility]    1 year ago Acute pain of right shoulder    [facility], Barbara Jaimes APRN    Office Visit

## 2024-08-09 RX ORDER — MELOXICAM 15 MG/1
15 TABLET ORAL DAILY
Qty: 90 TABLET | Refills: 3 | Status: SHIPPED | OUTPATIENT
Start: 2024-08-09

## 2024-08-09 NOTE — TELEPHONE ENCOUNTER
Please review. Protocol Failed; No Protocol    Requested Prescriptions   Pending Prescriptions Disp Refills    MELOXICAM 15 MG Oral Tab [Pharmacy Med Name: MELOXICAM 15MG TABLETS] 90 tablet 3     Sig: TAKE 1 TABLET(15 MG) BY MOUTH DAILY       Non-Narcotic Pain Medication Protocol Failed - 8/5/2024 10:11 AM        Failed - In person appointment or virtual visit in the past 6 mos or appointment in next 3 mos     Recent Outpatient Visits              7 months ago Viral URI with cough    [facility], [facility] [place]    Office Visit    9 months ago [facility]ine physical examination    [facility]    Office Visit    10 months ago Dysfunction of both eustachian tubes    [facility] Michael Meraz MD    Office Visit    1 year ago Dyshidrotic eczema    [facility]    Office Visit    1 year ago Acute pain of right shoulder    [facility], [place]    Office Visit          Future Appointments         Provider Department Appt Notes    In 1 month Darcie Yin DPM [facility], [place] pain on outermost part of my right foot.                           Future Appointments         Provider Department Appt Notes    In 1 month Darcie Yin DPM [facility], [place] pain on outermost part of my right foot.          Recent Outpatient Visits              7 months ago Viral URI with cough    [facility], Walk[facility]In [facility], [facility], Odilia Arredondo APRN    Office Visit    9 months ago Routine physical examination    [facility]    Office Visit    10 months ago Dysfunction of both  eustachian tubes    [facility], [facility] Michael Meraz MD    Office Visit    1 year ago Dyshidrotic eczema    [facility], [facility] Nae Washington MD    Office Visit    1 year ago Acute pain of right [facility]    Office Visit

## 2024-10-02 RX ORDER — MELOXICAM 15 MG/1
15 TABLET ORAL DAILY
Qty: 90 TABLET | Refills: 3 | OUTPATIENT
Start: 2024-10-02

## 2024-10-22 RX ORDER — LISINOPRIL 20 MG/1
20 TABLET ORAL DAILY
Qty: 90 TABLET | Refills: 3 | Status: SHIPPED | OUTPATIENT
Start: 2024-10-22

## 2024-10-22 NOTE — TELEPHONE ENCOUNTER
Refill passed per [facility] protocol.    Please review pended refill request as unable to refill due to high/very high interaction warning copied here:      Very High  Drug-Drug: spironolactone and lisinoprilHyperkalemia, possibly with cardiac arrhythmias or arrest, may occur with the combination of potassium-sparing diuretics and ACE inhibitors. Serum potassium concentrations and renal function should be monitored. Patients with renal impairment, diabetes, older age, severe heart failure, and risk for dehydration may be at greater risk.  Details  Override reason        lisinopril 20 MG Oral Tab [Pharmacy Med Name: LISINOPRIL 20MG TABLETS]  Prescription. Reordered.  spironolactone 50 MG Oral TabPrescription. Active.      Please see message below for upcoming appointment.    Future Appointments   Date Time Provider Department Center   12/18/2024  9:00 AM Nae Washington MD [facility]       Requested Prescriptions   Pending Prescriptions Disp Refills    LISINOPRIL 20 MG Oral Tab [Pharmacy Med Name: LISINOPRIL 20MG TABLETS] 90 tablet 0     Sig: TAKE 1 TABLET(20 MG) BY MOUTH DAILY       Hypertension Medications Protocol Passed - 10/22/2024 12:51 PM        Passed - CMP or BMP in past 12 months        Passed - Last BP reading less than 140/90     BP Readings from Last 1 Encounters:   12/20/23 128/86               Passed - In person appointment or virtual visit in the past 12 mos or appointment in next 3 mos     Recent Outpatient Visits              10 months ago Viral URI with cough    [facility]    Office Visit    11 months ago Routine physical examination    [facility] Nae Washington MD    Office Visit    1 year ago Dysfunction of both eustachian tubes    [facility] Michael Meraz MD    Office Visit    1 year ago Dyshidrotic eczema    [place]  [facility], [facility] Nae Washington MD    Office Visit    1 year ago Acute pain of right shoulder    [facility]    [facility]          Future Appointments         Provider Department Appt Notes    In 1 month Nae Washington MD [facility] Just a regular visit to check blood pressure etc…  last phx 11/6/23 -[place]                    Passed - EGFRCR or GFRNAA > 50     GFR Evaluation  EGFRCR: 103 , resulted on 11/6/2023             Recent Outpatient Visits              10 months ago Viral URI with cough    [facility], [facility], Odilia Arredondo APRN    Office Visit    11 months ago Routine physical examination    [facility] Nae Washington MD    Office Visit    1 year ago Dysfunction of both eustachian tubes    [facility] Michael Meraz MD    Office Visit    1 year ago Dyshidrotic eczema    [facility] Nae Washington MD    Office Visit    1 year ago Acute pain of right shoulder    [facility]    Office Visit          Future Appointments         Provider Department Appt Notes    In 1 month Nae Washington MD [facility] Just a regular visit to check blood pressure etc…  last phx 11/6/23 -[place]

## 2024-10-25 NOTE — TELEPHONE ENCOUNTER
This refill request is being sent to the provider for the following reason:  [x]Patient has not had an appointment within the past 12 months   []Medication is not within protocol  [x]Patient did not complete follow up recommendations  [x]Other: Last office visit 10/09/2023    Doctor please review and sign, if appropriate.

## 2024-10-28 RX ORDER — MONTELUKAST SODIUM 10 MG/1
10 TABLET ORAL NIGHTLY
Qty: 90 TABLET | Refills: 3 | OUTPATIENT
Start: 2024-10-28

## 2024-11-12 ENCOUNTER — PATIENT MESSAGE (OUTPATIENT)
Dept: FAMILY MEDICINE CLINIC | Facility: CLINIC | Age: 64
End: 2024-11-12

## 2024-11-12 DIAGNOSIS — Z12.31 ENCOUNTER FOR SCREENING MAMMOGRAM FOR BREAST CANCER: Primary | ICD-10-CM

## 2024-11-13 NOTE — TELEPHONE ENCOUNTER
Future Appointments   Date Time Provider Department Center   12/2/2024  8:00 AM Carolyne Bedoya DPM [facility]   12/18/2024  9:00 AM Nae Washington MD [facility]

## 2024-12-02 ENCOUNTER — OFFICE VISIT (OUTPATIENT)
Dept: PODIATRY CLINIC | Facility: CLINIC | Age: 64
End: 2024-12-02
Payer: COMMERCIAL

## 2024-12-02 DIAGNOSIS — M84.374A STRESS FRACTURE OF RIGHT FOOT, INITIAL ENCOUNTER: ICD-10-CM

## 2024-12-02 DIAGNOSIS — M76.71 PERONEAL TENDONITIS, RIGHT: Primary | ICD-10-CM

## 2024-12-02 PROCEDURE — 99204 OFFICE O/P NEW MOD 45 MIN: CPT | Performed by: PODIATRIST

## 2024-12-02 NOTE — PROGRESS NOTES
Reason for Visit      Gila Neri is a 64 year old female presents today complaining of right foot pain.     History of Present Illness     Patient presents to clinic today complaining of right foot pain.  Patient has a history of bunionectomy procedure and last saw podiatry in 2021.  Patient continues to get some numbness and tingling on medial aspect of the right foot, however her primary complaint is pain, numbness swelling on the lateral aspect of her right foot and ankle.  Patient is an active person and walks a significant amount daily and gets pain so bad at night that she can barely sleep.  Patient denies any weakness or instability but states she feels better in a walking shoe.    The following portions of the patient's history were reviewed and updated as appropriate: allergies, current medications, past family history, past medical history, past social history, past surgical history and problem list.    Allergies[1]      Current Outpatient Medications:     lisinopril 20 MG Oral Tab, Take 1 tablet (20 mg total) by mouth daily., Disp: 90 tablet, Rfl: 3    Meloxicam 15 MG Oral Tab, Take 1 tablet (15 mg total) by mouth daily., Disp: 90 tablet, Rfl: 3    CYCLOBENZAPRINE 5 MG Oral Tab, TAKE 1 TABLET(5 MG) BY MOUTH EVERY NIGHT, Disp: 30 tablet, Rfl: 2    atorvastatin 40 MG Oral Tab, Take 1 tablet (40 mg total) by mouth nightly., Disp: 90 tablet, Rfl: 3    spironolactone 50 MG Oral Tab, Take 1 tablet (50 mg total) by mouth daily., Disp: 90 tablet, Rfl: 3    albuterol 108 (90 Base) MCG/ACT Inhalation Aero Soln, Inhale 1 puff into the lungs every 4 (four) hours as needed., Disp: 1 each, Rfl: 0    montelukast 10 MG Oral Tab, Take 1 tablet (10 mg total) by mouth nightly., Disp: 90 tablet, Rfl: 3    triamcinolone 0.1 % External Ointment, Apply to affected area twice a day, Disp: 30 g, Rfl: 0    Multiple Vitamins-Minerals (MULTI-VITAMIN GUMMIES) Oral Chew Tab, , Disp: , Rfl:     Vitamin D3 (VITAMIN D3) 1000  UNITS Oral Tab, Take 1 tablet (1,000 Units total) by mouth daily., Disp: , Rfl:     There are no discontinued medications.    Patient Active Problem List   Diagnosis    Primary hypertension    Hypercholesterolemia    Personal history of colonic polyps    Hearing loss    Allergic rhinitis    Diffuse myofascial pain syndrome    Primary osteoarthritis involving multiple joints    Contact dermatitis    Mild intermittent asthma (HCC)    Migraines    Right wrist pain    Arthritis of carpometacarpal (CMC) joint of right thumb    Aftercare following surgery of the musculoskeletal system    Primary osteoarthritis of left shoulder    Polyneuropathy    Tinnitus of both ears    Lumbar radiculopathy    Seasonal allergic rhinitis    Chronic pain of left knee    Osteoarthritis of left knee    Degenerative tear of medial meniscus, left    Degenerative tear of lateral meniscus, left    Loose body in knee, left    Synovitis of left knee    Dyslipidemia    Primary osteoarthritis of both knees    Overweight (BMI 25.0-29.9)       Past Medical History:    Cubital tunnel syndrome, right/SX DATE 5-8-17, CPT 51007, W/LIVIAA, GLOBAL EXP 8-6-17    Degenerative TFCC tear, right    Endometriosis    Hearing impairment    BILATERAL HEARING AIDS    High blood pressure    High cholesterol    Lumbar disc herniation    2013 discectomy    Migraines    Neuropathy    Osteoarthritis    Primary osteoarthritis of first carpometacarpal joint of right hand    Rotator cuff syndrome, right    Sx.7/18/16, CPT.88485, R Mid Trigger Finger Release, w/, Global Exp.10/16/16    Trigger index finger of right hand    Visual impairment       Past Surgical History:   Procedure Laterality Date    Back surgery      Bunionectomy Right 09/2021    Carpal tunnel release  2009    [LATERALITY NOT STATED]    Colonoscopy      Colonoscopy N/A 11/05/2019    Procedure: COLONOSCOPY;  Surgeon: Bernardo Sapp MD;  Location: [facility] ENDOSCOPY    Colonoscopy N/A  2/28/2023    Procedure: COLONOSCOPY;  Surgeon: Bernardo Sapp MD;  Location: [facility] ENDOSCOPY    Colonoscopy & polypectomy  2011    colonoscopy with polypectomy, repat in 5 yr; colonic polyps, tubular adenoma    Knee arthroscopy Left 12/02/2022    partial medial and lateral meniscectomy    Other Right     MIDDLE TRIGGER FINGER RELEASE    Other Right     ELBOW ULNAR RELEASE    Percutaneous lumbar diskectomy  2013    discectomy; Lumbar disc herniation       Family History   Problem Relation Age of Onset    Heart Disorder Father     Hypertension Father     Heart Disease Father         CAD    Diabetes Mother     Hypertension Mother     Heart Disorder Mother     Asthma Mother     Other (Other) Mother     Thyroid Disorder Sister         hypothyroidism    Other (sarcoidosis) Sister     Cancer Maternal Aunt         pancreatic cancer    Psychiatric Son     Stroke Maternal Grandmother         cerebrovascular accident    Other (CVA) Maternal Grandmother     Cancer Maternal Grandfather         bone cancer, pancreatic cancer    Ovarian Cancer Paternal Grandmother     Colon Cancer Paternal Grandfather     Breast Cancer Paternal Cousin Female 64    Arrhythmia Neg         sudden cardiac death, family h/o       Social History     Occupational History    Not on file   Tobacco Use    Smoking status: Never     Passive exposure: Never    Smokeless tobacco: Never   Vaping Use    Vaping status: Never Used   Substance and Sexual Activity    Alcohol use: Yes     Comment: rarely    Drug use: No    Sexual activity: Not on file       ROS      Constitutional: negative for chills, fevers and sweats  Gastrointestinal: negative for abdominal pain, diarrhea, nausea and vomiting  Genitourinary:negative for dysuria and hematuria  Musculoskeletal:negative for arthralgias and muscle weakness  Neurological: negative for paresthesia and weakness  All others reviewed and negative.      Physical Exam     LE PHYSICAL EXAM    Constitution:  Well-developed and well-nourished. Gait appears normal. No apparent distress. Alert and oriented to person, place, and time.  Integument: There are no varicosities. Skin appears moist, warm, and supple with positive hair growth. There are no color changes. No open lesions. No macerations, No Hyperkeratotic lesions.  Vascular examination: Dorsalis pedis and posterior tibial pulses are strong bilaterally with capillary filling time less than 3 seconds to all digits. There is no peripheral edema..  Neurological Sensorium: Grossly intact to sharp/dull. Vibratory: Intact.  Musculoskeletal:   5/5 pedal muscle strength b/l     Pain on palpation to the fifth metatarsal and fifth metatarsal base as well as along the peroneal tendon.  Pain with eversion against resistance.  Negative Tinel or Veley sign.  No pain all patient to the ATFL CFL.  Negative anterior drawer talar tilt test.    Assessment and Plan     Encounter Diagnoses   Name Primary?    Peroneal tendonitis, right Yes    Stress fracture of right foot, initial encounter    I do lengthy discussion with patient regards her clinical presentation and treatment moving forward.  Discussed the combination of the pole of the peroneal tendons as well as the aggravation irritation of the bone.  Discussed further imaging such as an MRI though would not change her initial treatment.  Discussed immobilization with the very walking boot and/or brace or shoe.  Patient opted for the walking boot at this time.  Patient to wear it consistently for about 4 weeks while at work as she does this come out of walking.  Discussed with patient that if her pain does not remove within 4 weeks unfortunately would recommend an MRI.  Also discussed topical Voltaren gel.    Patient was instructed to call the office or on-call podiatric physician immediately with any issues or concerns before the next scheduled visit. Patient to follow-up in clinic in 4 weeks      Carolyne Moss DPM, NATHAN.AILYN,  MAXINE  Diplomat, American Board of Foot and Ankle Surgery  Certified in Foot and Rearfoot/Ankle Reconstruction  Fellow of the American College of Foot and Ankle Surgeons  Fellowship Trained Foot and Ankle Surgeon   [facility]     12/2/2024    7:22 AM         [1]   Allergies  Allergen Reactions    Bronopol SWELLING    Darvocet [Propoxyphene N-Apap] RASH and SWELLING    Penicillins RASH    Sulfa Antibiotics RASH    Sulfanilamide RASH    Mastisol Adhesive RASH     Blisters from skin glue    Other OTHER (SEE COMMENTS)     Adhesive/suture glue -blisters

## 2024-12-04 ENCOUNTER — TELEPHONE (OUTPATIENT)
Dept: ORTHOPEDICS CLINIC | Facility: CLINIC | Age: 64
End: 2024-12-04

## 2024-12-04 DIAGNOSIS — M25.511 RIGHT SHOULDER PAIN, UNSPECIFIED CHRONICITY: Primary | ICD-10-CM

## 2024-12-04 NOTE — TELEPHONE ENCOUNTER
Patient is scheduled for right shoulder pain. No x-rays in Epic. Please advise if imaging is needed.  Future Appointments   Date Time Provider Department Center   12/5/2024  3:00 PM Carlton Jarrett MD [facility] [place]   12/16/2024  8:20 AM [facility] RM1 [facility] [place]   12/18/2024  9:00 AM [facility]   1/7/2025  3:00 PM Brijesh Ramirez DO PM[facility]R [facility]   1/13/2025  8:00 AM Carolyne Bedoya DPM [facility]

## 2024-12-04 NOTE — TELEPHONE ENCOUNTER
Called patient and asked she come in early to get x-rays done prior to appointment. Patient agreeable to plan. Order entered for right shoulder x-rays per protocol.

## 2024-12-05 ENCOUNTER — HOSPITAL ENCOUNTER (OUTPATIENT)
Dept: GENERAL RADIOLOGY | Age: 64
Discharge: HOME | End: 2024-12-05
Attending: STUDENT IN AN ORGANIZED HEALTH CARE EDUCATION/TRAINING PROGRAM
Payer: COMMERCIAL

## 2024-12-05 ENCOUNTER — OFFICE VISIT (OUTPATIENT)
Dept: ORTHOPEDICS CLINIC | Facility: CLINIC | Age: 64
End: 2024-12-05
Payer: COMMERCIAL

## 2024-12-05 VITALS — DIASTOLIC BLOOD PRESSURE: 84 MMHG | HEART RATE: 104 BPM | SYSTOLIC BLOOD PRESSURE: 139 MMHG

## 2024-12-05 DIAGNOSIS — M25.511 RIGHT SHOULDER PAIN, UNSPECIFIED CHRONICITY: ICD-10-CM

## 2024-12-05 DIAGNOSIS — M19.011 PRIMARY OSTEOARTHRITIS OF RIGHT SHOULDER: Primary | ICD-10-CM

## 2024-12-05 PROCEDURE — 99204 OFFICE O/P NEW MOD 45 MIN: CPT | Performed by: STUDENT IN AN ORGANIZED HEALTH CARE EDUCATION/TRAINING PROGRAM

## 2024-12-05 PROCEDURE — 73030 X-RAY EXAM OF SHOULDER: CPT | Performed by: STUDENT IN AN ORGANIZED HEALTH CARE EDUCATION/TRAINING PROGRAM

## 2024-12-05 PROCEDURE — 20610 DRAIN/INJ JOINT/BURSA W/O US: CPT | Performed by: STUDENT IN AN ORGANIZED HEALTH CARE EDUCATION/TRAINING PROGRAM

## 2024-12-05 RX ORDER — METHYLPREDNISOLONE ACETATE 40 MG/ML
80 INJECTION, SUSPENSION INTRA-ARTICULAR; INTRALESIONAL; INTRAMUSCULAR; SOFT TISSUE ONCE
Status: COMPLETED | OUTPATIENT
Start: 2024-12-05 | End: 2024-12-05

## 2024-12-05 RX ADMIN — METHYLPREDNISOLONE ACETATE 80 MG: 40 INJECTION, SUSPENSION INTRA-ARTICULAR; INTRALESIONAL; INTRAMUSCULAR; SOFT TISSUE at 16:29:00

## 2024-12-05 NOTE — PROGRESS NOTES
[facility]  [address] [place] [address]  [address]  [phone]     NEW PATIENT VISIT - HISTORY AND PHYSICAL EXAMINATION     Name: Gila Neri   MRN: [id number]  Date: 12/5/2024     CC: Right shoulder pain    REFERRED BY: Nae Washington MD    HPI:   Gila Neri is a very pleasant 64 year old right-hand dominant female who presents today for evaluation, consultation, and management of pain.  She reports that she has had persistent menstrual pain for several years.  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  She is also active at home.  She has had prior cortisone injections in the past which provided significant relief.  The most recent junction was roughly in 2018.  She does have mechanical pain.  She does not have significant night pain.    PMH:   Past Medical History:    Cubital tunnel syndrome, right/SX DATE 5-8-17, CPT 47286, W/GLYNN, GLOBAL EXP 8-6-17    Degenerative TFCC tear, right    Endometriosis    Hearing impairment    BILATERAL HEARING AIDS    High blood pressure    High cholesterol    Lumbar disc herniation    2013 discectomy    Migraines    Neuropathy    Osteoarthritis    Primary osteoarthritis of first carpometacarpal joint of right hand    Rotator cuff syndrome, right    Sx.7/18/16, CPT.47506, R Mid Trigger Finger Release, w/, Global Exp.10/16/16    Trigger index finger of right hand    Visual impairment       PAST SURGICAL HX:  Past Surgical History:   Procedure Laterality Date    Back surgery      Bunionectomy Right 09/2021    Carpal tunnel release  2009    [LATERALITY NOT STATED]    Colonoscopy      Colonoscopy N/A 11/05/2019    Procedure: COLONOSCOPY;  Surgeon: Bernardo Sapp MD;  Location: [facility] ENDOSCOPY    Colonoscopy N/A 2/28/2023    Procedure: COLONOSCOPY;  Surgeon: Bernardo Sapp MD;  Location: [facility] ENDOSCOPY    Colonoscopy & polypectomy  2011    colonoscopy with polypectomy, repat in 5 yr; colonic polyps, tubular  adenoma    Knee arthroscopy Left 12/02/2022    partial medial and lateral meniscectomy    Other Right     MIDDLE TRIGGER FINGER RELEASE    Other Right     ELBOW ULNAR RELEASE    Percutaneous lumbar diskectomy  2013    discectomy; Lumbar disc herniation       FAMILY HX:  Family History   Problem Relation Age of Onset    Heart Disorder Father     Hypertension Father     Heart Disease Father         CAD    Diabetes Mother     Hypertension Mother     Heart Disorder Mother     Asthma Mother     Other (Other) Mother     Thyroid Disorder Sister         hypothyroidism    Other (sarcoidosis) Sister     Cancer Maternal Aunt         pancreatic cancer    Psychiatric Son     Stroke Maternal Grandmother         cerebrovascular accident    Other (CVA) Maternal Grandmother     Cancer Maternal Grandfather         bone cancer, pancreatic cancer    Ovarian Cancer Paternal Grandmother     Colon Cancer Paternal Grandfather     Breast Cancer Paternal Cousin Female 64    Arrhythmia Neg         sudden cardiac death, family h/o       ALLERGIES:  Bronopol, Darvocet [propoxyphene n-apap], Penicillins, Sulfa antibiotics, Sulfanilamide, Mastisol adhesive, and Other    MEDICATIONS:   Current Outpatient Medications   Medication Sig Dispense Refill    lisinopril 20 MG Oral Tab Take 1 tablet (20 mg total) by mouth daily. 90 tablet 3    Meloxicam 15 MG Oral Tab Take 1 tablet (15 mg total) by mouth daily. 90 tablet 3    CYCLOBENZAPRINE 5 MG Oral Tab TAKE 1 TABLET(5 MG) BY MOUTH EVERY NIGHT 30 tablet 2    atorvastatin 40 MG Oral Tab Take 1 tablet (40 mg total) by mouth nightly. 90 tablet 3    spironolactone 50 MG Oral Tab Take 1 tablet (50 mg total) by mouth daily. 90 tablet 3    albuterol 108 (90 Base) MCG/ACT Inhalation Aero Soln Inhale 1 puff into the lungs every 4 (four) hours as needed. 1 each 0    montelukast 10 MG Oral Tab Take 1 tablet (10 mg total) by mouth nightly. 90 tablet 3    triamcinolone 0.1 % External Ointment Apply to affected area  twice a day 30 g 0    Multiple Vitamins-Minerals (MULTI-VITAMIN GUMMIES) Oral Chew Tab       Vitamin D3 (VITAMIN D3) 1000 UNITS Oral Tab Take 1 tablet (1,000 Units total) by mouth daily.         ROS: A comprehensive 14 point review of systems was performed and was negative aside from the aforementioned per history of present illness.    SOCIAL HX:  Social History     Occupational History    Not on file   Tobacco Use    Smoking status: Never     Passive exposure: Never    Smokeless tobacco: Never   Vaping Use    Vaping status: Never Used   Substance and Sexual Activity    Alcohol use: Yes     Comment: rarely    Drug use: No    Sexual activity: Not on file        PE:   Vitals:    12/05/24 1455   BP: 139/84   Pulse: 104     Estimated body mass index is 31.76 kg/m² as calculated from the following:    Height as of 12/20/23: 5' 4\" (1.626 m).    Weight as of 12/20/23: 185 lb (83.9 kg).    Physical Exam  Constitutional:       Appearance: Normal appearance.   HENT:      Head: Normocephalic and atraumatic.   Eyes:      Extraocular Movements: Extraocular movements intact.   Neck:      Musculoskeletal: Normal range of motion and neck supple.   Cardiovascular:      Pulses: Normal pulses.   Pulmonary:      Effort: Pulmonary effort is normal. No respiratory distress.   Abdominal:      General: There is no distension.   Skin:     General: Skin is warm.      Capillary Refill: Capillary refill takes less than 2 seconds.      Findings: No bruising.   Neurological:      General: No focal deficit present.      Mental Status: Alert.   Psychiatric:         Mood and Affect: Mood normal.     Examination of the right shoulder demonstrates:     Skin is intact, warm and dry.   Cervical:  Full ROM  Spurling's  Negative    Deformity:   none  Atrophy:   none    Scapular winging: Negative    Palpation:     AC Joint  Negative  Biceps Tendon  Positive  Greater Tuberosity Negative    ROM:   Forward Flexion:  120°  Abduction:   90°  External  Rotation:  30°  Internal Rotation:  buttocks    Rotator Cuff Strength:   Supraspinatus:   5/5  Subscapularis:   5/5  Infraspinatus/Teres: 5/5    Provocative Tests:   Carson:   Positive  Speed's:   Positive  Olustee's:   Positive  Lift-off:   Negative  Apprehension:  Negative  Sulcus Sign:   Negative    Neurovascular Upper Extremity (Bilateral)  Motor:    5/5 EPL, Finger Abduction, , Pinch, Deltoid  Sensation:   intact to light touch median, ulnar, radial and axillary nerve  Circulation:   Normal, 2+ radial pulse    The contralateral upper extremity is without limitation in range of motion or strength, no positive provocative maneuvers.     Radiographic Examination/Diagnostics:    I personally viewed, independently interpreted and radiology report was reviewed.      3 views of the right shoulder were obtained today demonstrating moderate osteoarthritis with bone-on-bone and an inferior osteophyte appreciated in the AP view    IMPRESSION: Gila Neri is a 64 year old Right hand dominant female with right shoulder pain consistent with moderate osteoarthritis with rotator cuff tendinitis    PLAN:   We had a detailed discussion outlining the etiology, anatomy, pathophysiology, and natural history of the patient's findings. Imaging was reviewed in detail and correlated to a 3-dimensional model of the patient's pathology.     We reviewed the treatment of this disease condition.  Fortunately, treatment is amenable to conservative treatment which we chose to optimize at today's visit.  She has participated in physical therapy and at this point has exhausted physical therapy.  She was given a cortisone junction at today's visit and was counseled to follow-up on an as-needed basis.  All relevant questions were answered at today's  visit and she tolerated the injection very well    Procedure Note Corticosteroid  Injection  _______________________________________________________________________________________________________________  Patient Name: Gila Neri   Date: 12/5/2024     Based on history, physical exam, and available diagnostic testing, the patient diagnosis appears consistent with intraarticular pathology. We discussed the use of a corticosteroid injection for therapeutic/confirmatory diagnostic purposes. The risks, benefits, and potential complications of corticosteroid injection(s) were discussed in detail. The risks include, but are not limited to: pain, infection, bleeding/hematoma, swelling, flare response, incomplete resolution of symptoms, possible need for further injections or subsequent surgical intervention, subcutaneous fat atrophy, skin pigmentation changes, and elevation of blood sugar. The patient verbalized an understanding and agrees to the injection(s).     Under sterile prep, approximately:  2 mL DepoMedrol 40mg/ml, 4 mL marcaine plain, 4 mL lidocaine plain    was injected into: Right shoulder joint 50% GH 50% SA    This procedure was performed without ultrasound guidance    This procedure was well tolerated. The patient understands that the injection could take several days to take effect.    The patient was not sedated and fully conscious for the injection.  Prior to the injection, verification followed the Universal Protocol in the following manner:  [X] A \"Time Out\" was performed prior to the procedure to confirm patient identification, injection site, and preparation of equipment.  [X] The patient was identified using two patient identifiers.  [X] The procedure site was appropriately prepped    Carlton Jarrett MD  Orthopaedic Surgery  12/5/2024         FOLLOW-UP:   Return to clinic on an as needed basis.           Carlton Jarrett MD Orthopedic Surgery / Sports Medicine Specialist  [facility]  [address] [place] [address]  [email or web address]    t:  [phone] f: [phone]    This note was dictated using Dragon software.  While it was briefly proofread prior to completion, some grammatical, spelling, and word choice errors due to dictation may still occur.

## 2024-12-05 NOTE — PROGRESS NOTES
Per  request was draw 1 syringe with 4 ml Lidocaine 2% ; 4 ml Marcaine 0.5% and 2 ml Depo-Medrol 40 mg for right  shoulder. Ai Turcios

## 2024-12-16 ENCOUNTER — HOSPITAL ENCOUNTER (OUTPATIENT)
Dept: MAMMOGRAPHY | Age: 64
Discharge: HOME | End: 2024-12-16
Attending: FAMILY MEDICINE
Payer: COMMERCIAL

## 2024-12-16 DIAGNOSIS — Z12.31 ENCOUNTER FOR SCREENING MAMMOGRAM FOR BREAST CANCER: ICD-10-CM

## 2024-12-16 PROCEDURE — 77067 SCR MAMMO BI INCL CAD: CPT | Performed by: FAMILY MEDICINE

## 2024-12-16 PROCEDURE — 77063 BREAST TOMOSYNTHESIS BI: CPT | Performed by: FAMILY MEDICINE

## 2024-12-18 ENCOUNTER — OFFICE VISIT (OUTPATIENT)
Dept: FAMILY MEDICINE CLINIC | Facility: CLINIC | Age: 64
End: 2024-12-18
Payer: COMMERCIAL

## 2024-12-18 ENCOUNTER — LAB ENCOUNTER (OUTPATIENT)
Dept: LAB | Age: 64
End: 2024-12-18
Attending: FAMILY MEDICINE
Payer: COMMERCIAL

## 2024-12-18 VITALS
SYSTOLIC BLOOD PRESSURE: 133 MMHG | HEART RATE: 92 BPM | HEIGHT: 65 IN | DIASTOLIC BLOOD PRESSURE: 81 MMHG | WEIGHT: 177 LBS | BODY MASS INDEX: 29.49 KG/M2 | OXYGEN SATURATION: 98 %

## 2024-12-18 DIAGNOSIS — Z00.00 ROUTINE PHYSICAL EXAMINATION: Primary | ICD-10-CM

## 2024-12-18 DIAGNOSIS — J45.20 MILD INTERMITTENT ASTHMA WITHOUT COMPLICATION (HCC): ICD-10-CM

## 2024-12-18 DIAGNOSIS — Z12.4 PAP SMEAR FOR CERVICAL CANCER SCREENING: ICD-10-CM

## 2024-12-18 DIAGNOSIS — Z86.0100 HISTORY OF COLONIC POLYPS: ICD-10-CM

## 2024-12-18 DIAGNOSIS — I10 ESSENTIAL HYPERTENSION: ICD-10-CM

## 2024-12-18 DIAGNOSIS — Z78.0 ASYMPTOMATIC MENOPAUSAL STATE: ICD-10-CM

## 2024-12-18 DIAGNOSIS — M19.011 PRIMARY OSTEOARTHRITIS OF RIGHT SHOULDER: ICD-10-CM

## 2024-12-18 DIAGNOSIS — M54.12 CERVICAL RADICULOPATHY: ICD-10-CM

## 2024-12-18 DIAGNOSIS — Z00.00 ROUTINE PHYSICAL EXAMINATION: ICD-10-CM

## 2024-12-18 LAB
ALBUMIN SERPL-MCNC: 5.1 G/DL (ref 3.2–4.8)
ALBUMIN/GLOB SERPL: 2.4 {RATIO} (ref 1–2)
ALP LIVER SERPL-CCNC: 125 U/L
ALT SERPL-CCNC: 20 U/L
ANION GAP SERPL CALC-SCNC: 8 MMOL/L (ref 0–18)
AST SERPL-CCNC: 19 U/L (ref ?–34)
BILIRUB SERPL-MCNC: 0.8 MG/DL (ref 0.2–1.1)
BUN BLD-MCNC: 12 MG/DL (ref 9–23)
BUN/CREAT SERPL: 19 (ref 10–20)
CALCIUM BLD-MCNC: 10.6 MG/DL (ref 8.7–10.4)
CHLORIDE SERPL-SCNC: 96 MMOL/L (ref 98–112)
CHOLEST SERPL-MCNC: 166 MG/DL (ref ?–200)
CO2 SERPL-SCNC: 27 MMOL/L (ref 21–32)
CREAT BLD-MCNC: 0.63 MG/DL
DEPRECATED RDW RBC AUTO: 39.7 FL (ref 35.1–46.3)
EGFRCR SERPLBLD CKD-EPI 2021: 98 ML/MIN/1.73M2 (ref 60–?)
ERYTHROCYTE [DISTWIDTH] IN BLOOD BY AUTOMATED COUNT: 11.9 % (ref 11–15)
FASTING PATIENT LIPID ANSWER: YES
FASTING STATUS PATIENT QL REPORTED: YES
GLOBULIN PLAS-MCNC: 2.1 G/DL (ref 2–3.5)
GLUCOSE BLD-MCNC: 102 MG/DL (ref 70–99)
HCT VFR BLD AUTO: 40.3 %
HDLC SERPL-MCNC: 65 MG/DL (ref 40–59)
HGB BLD-MCNC: 14.1 G/DL
LDLC SERPL CALC-MCNC: 73 MG/DL (ref ?–100)
MCH RBC QN AUTO: 31.8 PG (ref 26–34)
MCHC RBC AUTO-ENTMCNC: 35 G/DL (ref 31–37)
MCV RBC AUTO: 90.8 FL
NONHDLC SERPL-MCNC: 101 MG/DL (ref ?–130)
OSMOLALITY SERPL CALC.SUM OF ELEC: 272 MOSM/KG (ref 275–295)
PLATELET # BLD AUTO: 330 10(3)UL (ref 150–450)
POTASSIUM SERPL-SCNC: 4.5 MMOL/L (ref 3.5–5.1)
PROT SERPL-MCNC: 7.2 G/DL (ref 5.7–8.2)
RBC # BLD AUTO: 4.44 X10(6)UL
SODIUM SERPL-SCNC: 131 MMOL/L (ref 136–145)
TRIGL SERPL-MCNC: 167 MG/DL (ref 30–149)
VLDLC SERPL CALC-MCNC: 26 MG/DL (ref 0–30)
WBC # BLD AUTO: 6.9 X10(3) UL (ref 4–11)

## 2024-12-18 PROCEDURE — 90471 IMMUNIZATION ADMIN: CPT | Performed by: FAMILY MEDICINE

## 2024-12-18 PROCEDURE — 85027 COMPLETE CBC AUTOMATED: CPT

## 2024-12-18 PROCEDURE — 90662 IIV NO PRSV INCREASED AG IM: CPT | Performed by: FAMILY MEDICINE

## 2024-12-18 PROCEDURE — 99397 PER PM REEVAL EST PAT 65+ YR: CPT | Performed by: FAMILY MEDICINE

## 2024-12-18 PROCEDURE — 80061 LIPID PANEL: CPT

## 2024-12-18 PROCEDURE — 80053 COMPREHEN METABOLIC PANEL: CPT

## 2024-12-18 PROCEDURE — 36415 COLL VENOUS BLD VENIPUNCTURE: CPT

## 2024-12-18 NOTE — PROGRESS NOTES
Subjective:   Patient ID: Gila Neri is a 64 year old female.    Patient presents for routine physical.    Shoulder Pain         History/Other:   Review of Systems   Constitutional: Negative.    Respiratory: Negative.     Cardiovascular: Negative.    Gastrointestinal: Negative.    Musculoskeletal:  Positive for arthralgias and neck pain.   Skin: Negative.    Neurological: Negative.      Current Outpatient Medications   Medication Sig Dispense Refill    lisinopril 20 MG Oral Tab Take 1 tablet (20 mg total) by mouth daily. 90 tablet 3    Meloxicam 15 MG Oral Tab Take 1 tablet (15 mg total) by mouth daily. 90 tablet 3    atorvastatin 40 MG Oral Tab Take 1 tablet (40 mg total) by mouth nightly. 90 tablet 3    spironolactone 50 MG Oral Tab Take 1 tablet (50 mg total) by mouth daily. 90 tablet 3    albuterol 108 (90 Base) MCG/ACT Inhalation Aero Soln Inhale 1 puff into the lungs every 4 (four) hours as needed. 1 each 0    montelukast 10 MG Oral Tab Take 1 tablet (10 mg total) by mouth nightly. 90 tablet 3    triamcinolone 0.1 % External Ointment Apply to affected area twice a day 30 g 0    Multiple Vitamins-Minerals (MULTI-VITAMIN GUMMIES) Oral Chew Tab       Vitamin D3 (VITAMIN D3) 1000 UNITS Oral Tab Take 1 tablet (1,000 Units total) by mouth daily.       Allergies:Allergies[1]    Objective:   Physical Exam  Constitutional:       Appearance: Normal appearance.   Cardiovascular:      Rate and Rhythm: Normal rate and regular rhythm.      Heart sounds: Normal heart sounds.   Pulmonary:      Effort: Pulmonary effort is normal.      Breath sounds: Normal breath sounds.   Chest:   Breasts:     Right: Normal. No mass.      Left: Normal. No mass.   Abdominal:      Palpations: Abdomen is soft. There is no mass.      Tenderness: There is no abdominal tenderness.   Genitourinary:     General: Normal vulva.      Labia:         Right: No lesion.         Left: No lesion.       Vagina: Normal.      Cervix: Normal.    Lymphadenopathy:      Cervical: No cervical adenopathy.      Upper Body:      Right upper body: No axillary adenopathy.      Left upper body: No axillary adenopathy.   Skin:     General: Skin is warm and dry.   Neurological:      Mental Status: She is alert and oriented to person, place, and time.         Assessment & Plan:   1. Routine physical examination-patient is , adult children and grandchildren.  Postmenopausal without spotting.  Continuing to work 2 jobs .  Planning to retire in about 2 years.  Exercising regularly with walking.  Recent mammogram normal  Labs done today  Pap smear done today  Flu vaccine given today   2. Mild intermittent asthma without complication (HCC)-well-controlled using albuterol and Singulair as needed   3. History of colonic polyps-due for colonoscopy 2026   4. Essential hypertension-blood pressure well-controlled on current medication   5. Primary osteoarthritis of right shoulder-had recent injection with Dr. Dontae Fang with relief of pain with certain movements.  However persistent pain right scapular region intermittently radiating down her arm to her fourth and fifth fingers.  She has an appointment next month with Dr. Ramirez.  In the meantime continue meloxicam, Tylenol as needed, referral for physical therapy to evaluate for TENS unit.   6. Cervical radiculopathy-as above   7. Asymptomatic menopausal state-recommend DEXA scan       Orders Placed This Encounter   Procedures    Lipid Panel [E]    CBC, Platelet, No Differential [E]    Comp Metabolic Panel (14) [E]    High Dose Fluzone trivalent influenza, 65yrs+ PFS (76760)       Meds This Visit:  Requested Prescriptions      No prescriptions requested or ordered in this encounter       Imaging & Referrals:  INFLUENZA VAC HIGH DOSE PRSV FREE  PHYSICAL THERAPY - INTERNAL  XR DEXA BONE DENSITOMETRY (CPT=77080)         [1]   Allergies  Allergen Reactions    Bronopol SWELLING    Darvocet [Propoxyphene  N-Apap] RASH and SWELLING    Penicillins RASH    Sulfa Antibiotics RASH    Sulfanilamide RASH    Mastisol Adhesive RASH     Blisters from skin glue    Other OTHER (SEE COMMENTS)     Adhesive/suture glue -blisters

## 2024-12-23 LAB
HPV E6+E7 MRNA CVX QL NAA+PROBE: NEGATIVE
PAP HISTORY (OTHER THAN LAST PAP): NORMAL

## 2025-01-07 ENCOUNTER — HOSPITAL ENCOUNTER (OUTPATIENT)
Dept: GENERAL RADIOLOGY | Facility: HOSPITAL | Age: 65
Discharge: HOME | End: 2025-01-07
Attending: PHYSICAL MEDICINE & REHABILITATION
Payer: COMMERCIAL

## 2025-01-07 ENCOUNTER — TELEPHONE (OUTPATIENT)
Dept: OTOLARYNGOLOGY | Facility: CLINIC | Age: 65
End: 2025-01-07

## 2025-01-07 ENCOUNTER — PATIENT MESSAGE (OUTPATIENT)
Dept: FAMILY MEDICINE CLINIC | Facility: CLINIC | Age: 65
End: 2025-01-07

## 2025-01-07 ENCOUNTER — OFFICE VISIT (OUTPATIENT)
Dept: PHYSICAL MEDICINE AND REHAB | Facility: CLINIC | Age: 65
End: 2025-01-07
Payer: COMMERCIAL

## 2025-01-07 VITALS — WEIGHT: 177 LBS | BODY MASS INDEX: 29.49 KG/M2 | HEIGHT: 65 IN

## 2025-01-07 DIAGNOSIS — M67.911 ROTATOR CUFF DISORDER, RIGHT: Primary | ICD-10-CM

## 2025-01-07 DIAGNOSIS — M54.2 NECK PAIN ON RIGHT SIDE: ICD-10-CM

## 2025-01-07 DIAGNOSIS — M54.12 CERVICAL RADICULOPATHY AT C8: ICD-10-CM

## 2025-01-07 PROCEDURE — 72050 X-RAY EXAM NECK SPINE 4/5VWS: CPT | Performed by: PHYSICAL MEDICINE & REHABILITATION

## 2025-01-07 PROCEDURE — 99204 OFFICE O/P NEW MOD 45 MIN: CPT | Performed by: PHYSICAL MEDICINE & REHABILITATION

## 2025-01-07 NOTE — TELEPHONE ENCOUNTER
PRESCRIPTION REFILL REQUEST FOR   MONTELUKAST 10 MG TABLETS  TAKE ONE TABLET BY MOUTH EVERY NIGHT

## 2025-01-07 NOTE — PROGRESS NOTES
NEW PATIENT VISIT    CHIEF COMPLAINT  Right shoulder pain  Right-sided neck pain    HISTORY OF PRESENTING ILLNESS  Gila Neri is a 64 year old female who presents for evaluation of Right shoulder pain and right-sided neck pain.  Patient is referred to my office to her primary care physician with complaints of right-sided neck and right shoulder blade stabbing pain.  Endorses numbness and tingling into the right hand with some weakness.  Currently rates her level pain 5/10.    She was prescribed meloxicam by her primary care physician in August. She takes this at night.     She is starting PT in about 2 weeks.     There is not much for exercise currently.     Patient was previously being seen by one of our orthopedic surgeons, underwent a landmark based injection of the subacromial bursa and glenohumeral joint on the right side, patient states that she might have a little bit more range of motion of the right shoulder however her pain is still persistent.  She has difficulty with pulling, raising her arm above her head and range of motion of shoulder.    Separately she also describes a pain in the right shoulder blade and intermittent pain traveling down into the fourth and fifth digits of the right hand.    Currently using meloxicam as above.    PAST MEDICAL HISTORY  Past Medical History:    Cubital tunnel syndrome, right/SX DATE 5-8-17, CPT 60676, W/GLYNN, GLOBAL EXP 8-6-17    Degenerative TFCC tear, right    Endometriosis    Hearing impairment    BILATERAL HEARING AIDS    High blood pressure    High cholesterol    Lumbar disc herniation    2013 discectomy    Migraines    Neuropathy    Osteoarthritis    Primary osteoarthritis of first carpometacarpal joint of right hand    Rotator cuff syndrome, right    Sx.7/18/16, CPT.32783, R Mid Trigger Finger Release, w/, Global Exp.10/16/16    Trigger index finger of right hand    Visual impairment       PAST SURGICAL HISTORY  Past Surgical History:    Procedure Laterality Date    Back surgery      Bunionectomy Right 09/2021    Carpal tunnel release  2009    [LATERALITY NOT STATED]    Colonoscopy      Colonoscopy N/A 11/05/2019    Procedure: COLONOSCOPY;  Surgeon: Bernardo Sapp MD;  Location: [facility] ENDOSCOPY    Colonoscopy N/A 2/28/2023    Procedure: COLONOSCOPY;  Surgeon: Bernardo Sapp MD;  Location: [facility] ENDOSCOPY    Colonoscopy & polypectomy  2011    colonoscopy with polypectomy, repat in 5 yr; colonic polyps, tubular adenoma    Knee arthroscopy Left 12/02/2022    partial medial and lateral meniscectomy    Other Right     MIDDLE TRIGGER FINGER RELEASE    Other Right     ELBOW ULNAR RELEASE    Percutaneous lumbar diskectomy  2013    discectomy; Lumbar disc herniation       MEDICATIONS  Medications Ordered Prior to Encounter[1]    ALLERGIES  Allergies[2]    SOCIAL HISTORY   reports that she has never smoked. She has never been exposed to tobacco smoke. She has never used smokeless tobacco. She reports current alcohol use. She reports that she does not use drugs.    FAMILY HISTORY  Family History   Problem Relation Age of Onset    Diabetes Mother     Hypertension Mother     Heart Disorder Mother     Asthma Mother     Other (Other) Mother     Heart Disorder Father     Hypertension Father     Heart Disease Father         CAD    Thyroid Disorder Sister         hypothyroidism    Other (sarcoidosis) Sister     Psychiatric Son     Stroke Maternal Grandmother         cerebrovascular accident    Other (CVA) Maternal Grandmother     Cancer Maternal Grandfather         bone cancer, pancreatic cancer    Ovarian Cancer Paternal Grandmother     Colon Cancer Paternal Grandfather     Pancreatic Cancer Maternal Aunt 60 - 69    Cancer Maternal Aunt         pancreatic cancer    Breast Cancer Paternal Cousin Female 64    Arrhythmia Neg         sudden cardiac death, family h/o    Ashkenazi Mormon Descent Neg        REVIEW OF SYSTEMS  Complete review of systems  was performed and was negative except for those items stated in the History of Presenting Illness and Past Medical/Surgical History.    PHYSICAL EXAMINATION  GENERAL:  In no acute distress. Well-developed and well nourished.   SKIN: No rashes or open wounds involving bilateral upper extremities and neck.  NEUROLOGIC:   Strength: 5/5 bilaterally with shoulder abduction, external rotation, internal rotation, elbow flexion, elbow extension, wrist extension, FDI, ADM, EIP and APB.   Sensation: intact light touch sensation throughout bilateral upper extremities.   Reflexes: intact and symmetric in bilateral upper extremities. Eagle’s negative.   MUSCULOSKELETAL:  Inspection: shoulders in protracted positions, head forward alignment. No scapular winging or muscular atrophy.   Palpation: tenderness was present over right parascapular musculature including the rhomboids and levator scapula, some tenderness over the paraspinal musculature on the right side.  Range of motion of the right shoulder is restricted in external rotation at the side, internal and external rotation abducted compared to the left side.  She has positive empty can testing, positive resisted external and internal rotation.  Spurling's maneuver is negative on the right side.  Yasmin's is negative as above.      REVIEW OF PRIOR X-RAYS/STUDIES  Independently reviewed the plain from radiographs of the right shoulder dated 12/5/2024 which reveals moderate to severe degenerative changes of the right shoulder with osteophytes.    IMPRESSION/DIAGNOSIS  No diagnosis found.    TREATMENT/PLAN  This patient may be dealing with 2 separate pain generators, the rotator cuff of the right side with some calcific tendinitis and possible chronic rotator cuff dysfunction as well as mild adhesive capsulitis on the right side.  For this would consider repeat corticosteroid injections under ultrasound guidance at a later date if persistent through therapy.    In regards to  her neck will obtain an x-ray of the cervical spine with flexion and extension dynamic views.  Additionally patient was started in physical therapy as previously prescribed.  If her pain remains appearing radicular would consider an MRI of the cervical spine at a later date.    PT as scheduled. XR cervical spine today.     Education was provided regarding the above impression/diagnosis and treatment options/plan were discussed.  All questions were answered during today's visit.  Patient will contact clinic if any other questions or concerns.            Brijesh Ramirez DO  Interventional Spine and Sports Medicine Specialist   Physical Medicine and Rehabilitation  [facility]  [address] [place] [id number]    [facility]  [address] [place] [address]                 [1]   Current Outpatient Medications on File Prior to Visit   Medication Sig Dispense Refill    lisinopril 20 MG Oral Tab Take 1 tablet (20 mg total) by mouth daily. 90 tablet 3    Meloxicam 15 MG Oral Tab Take 1 tablet (15 mg total) by mouth daily. 90 tablet 3    atorvastatin 40 MG Oral Tab Take 1 tablet (40 mg total) by mouth nightly. 90 tablet 3    spironolactone 50 MG Oral Tab Take 1 tablet (50 mg total) by mouth daily. 90 tablet 3    albuterol 108 (90 Base) MCG/ACT Inhalation Aero Soln Inhale 1 puff into the lungs every 4 (four) hours as needed. 1 each 0    montelukast 10 MG Oral Tab Take 1 tablet (10 mg total) by mouth nightly. 90 tablet 3    triamcinolone 0.1 % External Ointment Apply to affected area twice a day 30 g 0    Multiple Vitamins-Minerals (MULTI-VITAMIN GUMMIES) Oral Chew Tab       Vitamin D3 (VITAMIN D3) 1000 UNITS Oral Tab Take 1 tablet (1,000 Units total) by mouth daily.       No current facility-administered medications on file prior to visit.   [2]   Allergies  Allergen Reactions    Bronopol SWELLING    Darvocet [Propoxyphene N-Apap] RASH and SWELLING    Penicillins  RASH    Sulfa Antibiotics RASH    Sulfanilamide RASH    Mastisol Adhesive RASH     Blisters from skin glue    Other OTHER (SEE COMMENTS)     Adhesive/suture glue -blisters

## 2025-01-09 RX ORDER — MONTELUKAST SODIUM 10 MG/1
10 TABLET ORAL NIGHTLY
Qty: 90 TABLET | Refills: 3 | Status: SHIPPED | OUTPATIENT
Start: 2025-01-09

## 2025-01-09 NOTE — TELEPHONE ENCOUNTER
Dr Washington ---patient has not been to ENT because no issues with allergies. And Dr Meraz had prescribed Montelukast , but cannot refill it without a visit.     Would you be able to prescribe this?     Pended.         Please Review. Protocol Failed or has no protocol.       Requested Prescriptions   Pending Prescriptions Disp Refills    montelukast 10 MG Oral Tab 90 tablet 3     Sig: Take 1 tablet (10 mg total) by mouth nightly.       Asthma & COPD Medication Protocol Failed - 1/9/2025  9:40 AM        Failed - ACT Score greater than or equal to 20                Failed - ACT recorded in the last 12 months                Passed - Appointment in past 6 or next 3 months      Recent Outpatient Visits              2 days ago Rotator cuff disorder, [facility] Brijesh Ramirez DO    Office Visit    3 weeks ago [facility]    [facility]    1 month ago Primary osteoarthritis of right shoulder    [facility] [place] Wright-Chisem, Joshua, MD    Office Visit    1 month ago Peroneal tendonitis, [facility] Carolyne Bedoya DPM    Office Visit    1 year ago Viral URI with cough    [facility] [place], [facility] Odilia Maloney APRN    Office Visit          Future Appointments         Provider Department Appt Notes    In 4 days Carolyne Bedoya DPM [facility] follow up/Pain on the side of my right foot    In 6 days HND LIZBETH RM1; HND DEXA RM1 [facility] [place] Routine testing    [facility] [employer]    [facility] [employer]    [facility] [employer]    In 2 weeks  [facility] [employer]    In 3 weeks [facility] [employer]    In 3 weeks [facility] PT [facility] [employer]    In 1 month [facility] [employer]    In 1 month [facility], [facility] [employer]                    Passed - Medication is active on med list           Recent Outpatient Visits              2 days ago Rotator cuff disorder, [facility] Brijesh Ramirez DO    Office Visit    3 weeks ago Routine physical examination    [facility], Nae FALL MD    Office Visit    1 month ago Primary osteoarthritis of right shoulder    [facility]    Office Visit    1 month ago Peroneal tendonitis, [facility] Carolyne Bedoya DPM    Office Visit    1 year ago Viral URI with cough    [place], [facility] Odilia Maloney APRN    Office Visit          Future Appointments         Provider Department Appt Notes    In 4 days Carolyne Bedoya DPM [facility] follow up/Pain on the side of my right foot    In 6 days HND LIZBETH RM1; HND DEXA RM1 [facility] [place] Routine testing    [facility] [employer]    In 2 weeks [facility] [employer]    In 2 weeks [facility], [facility] [employer]    In 2 weeks [facility] [employer]    In 3 weeks [facility], [facility] [employer]    In 3 weeks [facility] [employer]    In 1 month [facility], [facility]  [employer]    In 1 month Markie Rees, PT [facility] [place]

## 2025-01-09 NOTE — TELEPHONE ENCOUNTER
From: Yousif Mendiola  To: Cady Lance MD  Sent: 6/13/2017 8:46 AM CDT  Subject: Medication Renewal Request    Original authorizing provider: Nae Eng [place], [address]  would like a refill of the following medications:  ATORVASTATIN 10 MG
There are refills on file.
2 seconds or less

## 2025-01-15 ENCOUNTER — HOSPITAL ENCOUNTER (OUTPATIENT)
Dept: BONE DENSITY | Age: 65
Discharge: HOME | End: 2025-01-15
Attending: FAMILY MEDICINE
Payer: COMMERCIAL

## 2025-01-15 DIAGNOSIS — Z78.0 ASYMPTOMATIC MENOPAUSAL STATE: ICD-10-CM

## 2025-01-15 PROCEDURE — 77080 DXA BONE DENSITY AXIAL: CPT | Performed by: FAMILY MEDICINE

## 2025-01-16 PROBLEM — M85.89 OSTEOPENIA OF MULTIPLE SITES: Status: ACTIVE | Noted: 2025-01-16

## 2025-01-20 ENCOUNTER — TELEPHONE (OUTPATIENT)
Dept: PHYSICAL THERAPY | Facility: HOSPITAL | Age: 65
End: 2025-01-20

## 2025-01-20 ENCOUNTER — OFFICE VISIT (OUTPATIENT)
Dept: PHYSICAL THERAPY | Facility: HOSPITAL | Age: 65
End: 2025-01-20
Attending: FAMILY MEDICINE
Payer: COMMERCIAL

## 2025-01-20 DIAGNOSIS — M54.12 CERVICAL RADICULOPATHY: ICD-10-CM

## 2025-01-20 DIAGNOSIS — M19.011 PRIMARY OSTEOARTHRITIS OF RIGHT SHOULDER: Primary | ICD-10-CM

## 2025-01-20 PROCEDURE — 97161 PT EVAL LOW COMPLEX 20 MIN: CPT

## 2025-01-20 PROCEDURE — 97110 THERAPEUTIC EXERCISES: CPT

## 2025-01-20 NOTE — PROGRESS NOTES
SPINE EVALUATION:     Diagnosis:   Primary osteoarthritis of right shoulder (M19.011)  Cervical radiculopathy (M54.12) Patient:  Gila Neri (64 year old, female)        Referring Provider: Nae Washington  Today's Date   1/20/2025    Precautions:  None   Date of Evaluation: 01/20/25  Next MD visit: No data recorded  Date of Surgery: No data recorded     PATIENT SUMMARY   Summary of chief complaints: cervical pain radiating posteriorly into R scapula with N/T present into some of 3rd digit and all of 4th and 5th digits.  History of current condition: Pt reports hx of R scapular pain. She reports pain has been present for years. She reports pain is \"knife-like.\" She reports 2 cortisone injections without relief in December of 2024. Reports neck pain in R cervical region. N/T present occasionally, in R 3rd to 5th digits. Use of Meloxicam, tylenol, heating pad, and biofreeze.   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   Leisure activities/Hobbies: Playing with grandchildren   Prior level of function: Pain present with daily activites.  Current limitations: Putting on deodorant, dressing, reaching for her seat belt, baking, picking up/feeding babies, admin work, difficulty sleeping  Pt goals: \" End the knife in the shoulder blade, neck , arm and shoulder pain.\"  Past medical history was reviewed with Gila.  Significant findings include: pt report of hx of ulnar entrapement release surgery  Imaging/Tests: X-ray of R shoulder and cervical   Gila  has a past medical history of Cubital tunnel syndrome, right/SX DATE 5-8-17, CPT 62788, W/LABRIOLA, GLOBAL EXP 8-6-17 (05/04/2016), Degenerative TFCC tear, right (05/04/2016), Endometriosis, Hearing impairment, High blood pressure, High cholesterol, Lumbar disc herniation (2013),  Migraines, Neuropathy, Osteoarthritis, Primary osteoarthritis of first carpometacarpal joint of right hand (05/04/2016), Rotator cuff syndrome, right (02/09/2017), Sx.7/18/16, CPT.41051, R Mid Trigger Finger Release, w/, Global Exp.10/16/16 (05/04/2016), Trigger index finger of right hand (06/22/2016), and Visual impairment.  She  has a past surgical history that includes back surgery; carpal tunnel release (2009); colonoscopy; colonoscopy & polypectomy (2011); percutaneous lumbar diskectomy (2013); other (Right); other (Right); colonoscopy (N/A, 11/05/2019); Bunionectomy (Right, 09/2021); knee arthroscopy (Left, 12/02/2022); and colonoscopy (N/A, 2/28/2023).    Pt denies diplopia, dysarthria, dysphasia, dizziness, drop attacks, bowel/bladder changes, saddle anesthesia, and JEROMY LE N/T.     ASSESSMENT  Gila presents to physical therapy evaluation with primary c/o cervical pain radiating posteriorly into R scapula with N/T present into some of 3rd digit and all of 4th and 5th digits.. The results of the objective tests and measures show ROM defictis, weakness, TTP, hypmobility, and a positive spurlings and ULNT 3.. Functional deficits include but are not limited to Putting on deodorant, dressing, reaching for her seat belt, baking, picking up/feeding babies, admin work, difficulty sleeping. Signs and symptoms are consistent with diagnosis of Primary osteoarthritis of right shoulder (M19.011)  Cervical radiculopathy (M54.12). Pt and PT discussed evaluation findings, pathology, POC and HEP.  Pt voiced understanding and performs HEP correctly without reported pain. Skilled Physical Therapy is medically necessary to address the above impairments and reach functional goals.    OBJECTIVE:   Musculoskeletal:  Observation/Posture: Bilateral anterior shoulder roll, downward head   Accessory Motion: C4/C5 motion not accessed due to results from x-ray imaging. C7 UPA and CPA - painful to gr I-II, but WNL. C6 UPA and  CPA WNL mobility, but CPA painful w/ gr I-II. C3 - WNL mobility and not painful. T3 and T4 hypomobile w/ N/T into R hand reproduction. T5-T8 hypomobility.   Palpation: TTP R: LS, cervical and upper thoracic PS, teres major/minor, rear deltoids, triceps. N/T reproduction with palpation to upper thoracic PS     Special tests:   ULNT 3: Positive on R. Spurlings Positive on R. Cervical Distraction: negative, no change in sx.     JEROMY ROM and Strength:  (* denotes performed with pain)     Cervical ROM MMT (-/5)     Flex 43* 3*     Ext 35* 3+    R L R L     Side bend 35 35* 3 3     Rotation 62 57* NT NT   ,   Shoulder   ROM MMT (-/5)    R L R L     Flex 110* WNL 3+* 5     Abd (C5) 100 (translates slightly into scaption plane)* WNL 4* 5     IR R glute* T12 4+* 5     ER R UT* T2 4* 5     ,   Elbow   MMT (-/5)    R L     Flex (C6) 5 5     Ext (C7) 5 5   ,   Wrist   MMT (-/5)    R L     Flex (C7) 5* 5     Ext (C6) 5* 5     ,   Hand Strength (lbs) R L      23.0 42.2       Balance and Functional Mobility:  Gait: pt ambulates on level ground with normal mechanics.      Today's Treatment and Response:   Pt education was provided on exam findings, treatment diagnosis, treatment plan, expectations, and prognosis.  Today's Treatment       1/20/2025   Treatment   Therapeutic Exercise - HEP   - Chin tucks 5 sec x 15   - LS stretch 15 sec x 5  - Ulnar nerve glides x 15  - Rows RTB x 15    Therapeutic Exercise Min 25   Evaluation Min 30   Total of Timed Procedures 25   Total of Service Based 30   Total Treatment Time 55         Patient was instructed in and issued a HEP for: Access Code: [email or web address]  URL: [email or web address]  Date: 01/20/2025  Prepared by: Markie Rees    Exercises  - Seated Cervical Retraction  - 1 x daily - 7 x weekly - 2 sets - 10 reps -  5 sec hold  - Seated Levator Scapulae Stretch  - 1 x daily - 7 x weekly - 5 reps - 15 - 20 sec hold  - Ulnar Nerve Flossing I  - 1 x daily - 7 x weekly - 1-2  sets - 10 reps  - Standing Shoulder Row with Anchored Resistance  - 1 x daily - 7 x weekly - 3 sets - 10 reps    Charges:  PT EVAL: Low Complexity, 2 TE  In agreement with evaluation findings and clinical rationale, this evaluation involved LOW COMPLEXITY decision making due to no personal factors/comorbidities, 1-2 body structures involved/activity limitations, and stable symptoms as documented in the evaluation.                                                                         PLAN OF CARE:    Goals: (to be met in 10 visits)   Goals       Therapy Goals      Not Met Progress Toward Partially Met Met   Pt will improve cervical AROM flexion by 5 to 10 degrees to improve tolerance for looking down to tie shoes. [] [] [] []   Pt will improve cervical AROM extension by 5 to 10 degrees to improve tolerance for putting dishes into overhead cabinets. [] [] [] []   Pt will improve cervical AROM rotation by 5 degrees to improve tolerance for turning head to check blind spot while driving. [] [] [] []   Pt will have improved thoracic PA mobility to WNL to improve cervical ROM as well as promote upright posturing and decreased pain with work activities. [] [] [] []   Pt will improve shoulder flexion ROM by 10 or more degrees to improve participation in overhead activities.  [] [] [] []   Pt will demonstrate improved cervical intrinsic strength by 1/2 muscle grade to allow improved cervical stabilization with overhead reaching. [] [] [] []    [] [] [] []   Pt will be independent and compliant with comprehensive HEP to maintain progress achieved in PT. [] [] [] []                   Frequency / Duration: Patient will be seen 1-2x/week or a total of 10  visits over a 90 day period. Treatment will include: Manual Therapy; Gait training; Neuromuscular Re-education; Therapeutic Activities; Therapeutic Exercise; Home Exercise Program instruction; Electrical stimulation (attended)    Education or treatment limitation:  None   Rehab Potential: good     Neck Disability Index Score  Score: (Patient-Rptd) 48 % (1/13/2025  7:18 PM)      Patient/Family/Caregiver was advised of these findings, precautions, and treatment options and has agreed to actively participate in planning and for this course of care.    Thank you for your referral. Please co-sign or sign and return this letter via fax as soon as possible to [phone]. If you have any questions, please contact me at Dept: [phone]    Sincerely,  Electronically signed by therapist: Markie Rees PT  Physician's certification required: Yes  I certify the need for these services furnished under this plan of treatment and while under my care.    X___________________________________________________ Date____________________    Certification From: 1/20/2025  To:4/20/2025

## 2025-01-24 ENCOUNTER — OFFICE VISIT (OUTPATIENT)
Dept: PHYSICAL THERAPY | Facility: HOSPITAL | Age: 65
End: 2025-01-24
Attending: FAMILY MEDICINE
Payer: COMMERCIAL

## 2025-01-24 PROCEDURE — 97110 THERAPEUTIC EXERCISES: CPT

## 2025-01-24 PROCEDURE — 97112 NEUROMUSCULAR REEDUCATION: CPT

## 2025-01-24 PROCEDURE — 97140 MANUAL THERAPY 1/> REGIONS: CPT

## 2025-01-24 NOTE — PROGRESS NOTES
Patient: Gila Neri (64 year old, female) Referring Provider:  Insurance:   Diagnosis: Primary osteoarthritis of right shoulder (M19.011)  Cervical radiculopathy (M54.12) Nae [employer]   Date of Surgery: No data recorded Next MD visit:  N/A   Precautions:  None No data recorded Referral Information:    Date of Evaluation: Req: 0, Auth: 0, Exp:     01/20/25 POC Auth Visits:  10       Today's Date   1/24/2025    Subjective  Pt reports use of HEP. She reports ulnar nerve glides are bothersome to anterior shoulder, elbow and fingers. She reports anterior shoulder is bothersome today, making driving difficult. Describes the pain as painful/burning. R cervical pain not present currently, stretches she think may be helping posterior scap region.       Pain: 5/10     Objective  Significant GHJ posteriorly > inferiorly    Shoulder       1/20/2025 1/24/2025   Shoulder ROM/MMT   Rt Flexion Shoulder 110*    Lt Flexion Shoulder WNL    Rt Flexion Shoulder MMT 3+*    Lt Flexion Shoulder MMT 5    Rt Abduction Shoulder 100 (translates slightly into scaption plane)*    Lt Abduction Shoulder WNL    Rt Abduction Shoulder MMT (C5) 4*    Lt Abduction Shoulder MMT (C5) 5    Rt IR Shoulder R glute* 30   Lt IR Shoulder T12    Rt IR Shoulder MMT 4+*    Lt IR Shoulder MMT 5    Rt ER Shoulder R UT* 40   Lt ER Shoulder T2    Rt ER Shoulder MMT 4*    Lt ER Shoulder MMT 5          Assessment  Significant GHJ hypomobility noted. Discontinued ulnar nerve glides due increase in radiating sx. Attempted the lowest grade of nerve glides with arm in by side (zero deg flex.) but still increased sx. Discontinued use of nerve glides for time being. Progressing shoulder ROM as able.    Goals (to be met in 10 visits)   Goals       Therapy Goals      Not Met Progress Toward Partially Met Met   Pt will improve cervical AROM flexion by 5 to 10 degrees to improve tolerance for looking down to tie shoes. [] [] [] []   Pt will improve cervical  AROM extension by 5 to 10 degrees to improve tolerance for putting dishes into overhead cabinets. [] [] [] []   Pt will improve cervical AROM rotation by 5 degrees to improve tolerance for turning head to check blind spot while driving. [] [] [] []   Pt will have improved thoracic PA mobility to WNL to improve cervical ROM as well as promote upright posturing and decreased pain with work activities. [] [] [] []   Pt will improve shoulder flexion ROM by 10 or more degrees to improve participation in overhead activities.  [] [] [] []   Pt will demonstrate improved cervical intrinsic strength by 1/2 muscle grade to allow improved cervical stabilization with overhead reaching. [] [] [] []    [] [] [] []   Pt will be independent and compliant with comprehensive HEP to maintain progress achieved in PT. [] [] [] []                  Plan  Continue per POC. Focused on improving GHJ mobility. Potentially add self GHJ joint mobs with power band next session.    Treatment Last 4 Visits       1/20/2025 1/24/2025   Treatment   Treatment Day  2   Therapeutic Exercise - HEP   - Chin tucks 5 sec x 15   - LS stretch 15 sec x 5  - Ulnar nerve glides x 15  - Rows RTB x 15  - AAROM #1 WB Flexion x 15  - AAROM #1 WB Abduction x 15   - AAROM Cane ER x 15  - AROM w/ L UE on GHJ x 15  - ER by side YTB x 15  - IR by side YTB x 15   Neuro Re-Ed  - Standing scap retractions x 15   - Rows RTB x 20   - Extensions RTB x 20   - Isoball on wall: cervical retraction & chin tuck 5 sec x 10    Manual Therapy  - GHJ posterior mob: gr II-III  - GHJ inferior mob: gr II-III  - PROM: all directions (IR most limited)   Therapeutic Exercise Min 25 20   Neuro Re-Ed Min  10   Manual Therapy Min  15   Evaluation Min 30    Total of Timed Procedures 25 45   Total of Service Based 30 0   Total Treatment Time 55 45         HEP  Access Code: [email or web address]  URL: [email or web address]  Date: 01/24/2025  Prepared by: Markie Rees    Exercises  - Seated  Cervical Retraction  - 1 x daily - 7 x weekly - 2 sets - 10 reps -  5 sec hold  - Seated Levator Scapulae Stretch  - 1 x daily - 7 x weekly - 5 reps - 15 - 20 sec hold  - Standing Shoulder Row with Anchored Resistance  - 1 x daily - 7 x weekly - 3 sets - 10 reps  - Supine Shoulder Internal Rotation  - 1 x daily - 7 x weekly - 2 sets - 10 reps    Charges     1 TE: 1 MT: 1 NRE

## 2025-01-27 ENCOUNTER — OFFICE VISIT (OUTPATIENT)
Dept: PHYSICAL THERAPY | Facility: HOSPITAL | Age: 65
End: 2025-01-27
Attending: FAMILY MEDICINE
Payer: COMMERCIAL

## 2025-01-27 PROCEDURE — 97112 NEUROMUSCULAR REEDUCATION: CPT

## 2025-01-27 PROCEDURE — 97140 MANUAL THERAPY 1/> REGIONS: CPT

## 2025-01-27 NOTE — PROGRESS NOTES
Patient: Gila Neri (64 year old, female) Referring Provider:  Insurance:   Diagnosis: Primary osteoarthritis of right shoulder (M19.011)  Cervical radiculopathy (M54.12) Nae [employer]   Date of Surgery: No data recorded Next MD visit:  N/A   Precautions:  None No data recorded Referral Information:    Date of Evaluation: Req: 0, Auth: 0, Exp:     01/20/25 POC Auth Visits:  10       Today's Date   1/27/2025    Subjective  Pt reports \"knife\" in the back feeling is not as bad. She reports pain in anterior shoulder is the same. Reports TTP in anterior shoulder. Reports nerve pain has diminished. Reports a decrease in numbness in 4th and 5th digit.       Pain: 4/10     Objective  Significant GHJ posteriorly > inferiorly. Thoracic mobility T5-T8 limited.   Cervical       1/20/2025   Cervical ROM/MMT   Cervical Flex 43*   Cervical Flex MMT 3*   Cervical Extension 35*   Cervical Extension MMT 3+   Cervical Rt Side Bending 35   Cervical Lt Side Bending 35*   Cervical Rt Side Bending MMT 3   Cervical Lt Side Bending MMT 3   Cervical Rt Rotation 62   Cervical Lt Rotation 57*   Cervical Rt Rotation MMT NT   Cervical Lt Rotation MMT NT   , Shoulder       1/20/2025 1/24/2025   Shoulder ROM/MMT   Rt Flexion Shoulder 110*    Lt Flexion Shoulder WNL    Rt Flexion Shoulder MMT 3+*    Lt Flexion Shoulder MMT 5    Rt Abduction Shoulder 100 (translates slightly into scaption plane)*    Lt Abduction Shoulder WNL    Rt Abduction Shoulder MMT (C5) 4*    Lt Abduction Shoulder MMT (C5) 5    Rt IR Shoulder R glute* 30   Lt IR Shoulder T12    Rt IR Shoulder MMT 4+*    Lt IR Shoulder MMT 5    Rt ER Shoulder R UT* 40   Lt ER Shoulder T2    Rt ER Shoulder MMT 4*    Lt ER Shoulder MMT 5       Assessment  Addressed thoracic restrictions today with MT and self mobilizations. Continuing to progress shoulder ROM to improve pt participation in ADL's and work activities.    Goals (to be met in 10 visits)   Goals       Therapy Goals       Not Met Progress Toward Partially Met Met   Pt will improve cervical AROM flexion by 5 to 10 degrees to improve tolerance for looking down to tie shoes. [] [] [] []   Pt will improve cervical AROM extension by 5 to 10 degrees to improve tolerance for putting dishes into overhead cabinets. [] [] [] []   Pt will improve cervical AROM rotation by 5 degrees to improve tolerance for turning head to check blind spot while driving. [] [] [] []   Pt will have improved thoracic PA mobility to WNL to improve cervical ROM as well as promote upright posturing and decreased pain with work activities. [] [] [] []   Pt will improve shoulder flexion ROM by 10 or more degrees to improve participation in overhead activities.  [] [] [] []   Pt will demonstrate improved cervical intrinsic strength by 1/2 muscle grade to allow improved cervical stabilization with overhead reaching. [] [] [] []    [] [] [] []   Pt will be independent and compliant with comprehensive HEP to maintain progress achieved in PT. [] [] [] []                  Plan  Continue per POC. Focused on improving GHJ mobility. Add posterior GHJ self mob next session.    Treatment Last 4 Visits       1/20/2025 1/24/2025 1/27/2025   Treatment   Treatment Day  2 3   Therapeutic Exercise - HEP   - Chin tucks 5 sec x 15   - LS stretch 15 sec x 5  - Ulnar nerve glides x 15  - Rows RTB x 15  - AAROM #1 WB Flexion x 15  - AAROM #1 WB Abduction x 15   - AAROM Cane ER x 15  - AROM w/ L UE on GHJ x 15  - ER by side YTB x 15  - IR by side YTB x 15    Neuro Re-Ed  - Standing scap retractions x 15   - Rows RTB x 20   - Extensions RTB x 20   - Isoball on wall: cervical retraction & chin tuck 5 sec x 10  - Prone Rows x 15 B  - Prone MT x 15 B (decreased ROM R vs L)  - Prone GHJ extensions x 15 B (decreased ROM R vs L)  - Supine thoracic extension on 1/2 foam w/ arms behind head x 15   - Supine thoracic extension on 1/2 foam w/ arms flexion x 15   - LT setting x 10   - Isoball on  Wall: cervical retraction 5 sec x 10   - Isoball on Wall: LF 5 sec x 10 B  - Row GTB x 15  - Extensions RTB x 15    Manual Therapy  - GHJ posterior mob: gr II-III  - GHJ inferior mob: gr II-III  - PROM: all directions (IR most limited) - Thoracic R UPA: T6-T8  - GHJ posterior mob: gr II-III  - GHJ inferior mob: gr II-III  - PROM: all directions (IR most limited)       Therapeutic Exercise Min 25 20    Neuro Re-Ed Min  10 30   Manual Therapy Min  15 15   Evaluation Min 30     Total of Timed Procedures 25 45 45   Total of Service Based 30 0 0   Total Treatment Time 55 45 45         HEP  Access Code: [email or web address]  URL: [email or web address]  Date: 01/24/2025  Prepared by: Markie Rees    Exercises  - Seated Cervical Retraction  - 1 x daily - 7 x weekly - 2 sets - 10 reps -  5 sec hold  - Seated Levator Scapulae Stretch  - 1 x daily - 7 x weekly - 5 reps - 15 - 20 sec hold  - Standing Shoulder Row with Anchored Resistance  - 1 x daily - 7 x weekly - 3 sets - 10 reps  - Supine Shoulder Internal Rotation  - 1 x daily - 7 x weekly - 2 sets - 10 reps    Charges     2 NRE: 1 MT

## 2025-01-29 ENCOUNTER — OFFICE VISIT (OUTPATIENT)
Dept: PHYSICAL THERAPY | Facility: HOSPITAL | Age: 65
End: 2025-01-29
Attending: FAMILY MEDICINE
Payer: COMMERCIAL

## 2025-01-29 PROCEDURE — 97140 MANUAL THERAPY 1/> REGIONS: CPT

## 2025-01-29 PROCEDURE — 97112 NEUROMUSCULAR REEDUCATION: CPT

## 2025-01-29 NOTE — PROGRESS NOTES
Patient: Gila Neri (64 year old, female) Referring Provider:  Insurance:   Diagnosis: Primary osteoarthritis of right shoulder (M19.011)  Cervical radiculopathy (M54.12) [employer]   Date of Surgery: No data recorded Next MD visit:  N/A   Precautions:  None No data recorded Referral Information:    Date of Evaluation: Req: 0, Auth: 0, Exp:     01/20/25 POC Auth Visits:  10       Today's Date   1/29/2025    Subjective  Pt reports posterior shoulder feels a little better. She reports improved mobility in R shoulder.       Pain: 3/10     Objective  Moderate GHJ posteriorly > inferiorly. Thoracic mobility T5-T8 limited.             Shoulder       1/20/2025 1/24/2025 1/29/2025   Shoulder ROM/MMT   Rt Flexion Shoulder 110*  153   Lt Flexion Shoulder WNL     Rt Flexion Shoulder MMT 3+*     Lt Flexion Shoulder MMT 5     Rt Abduction Shoulder 100 (translates slightly into scaption plane)*  155   Lt Abduction Shoulder WNL     Rt Abduction Shoulder MMT (C5) 4*     Lt Abduction Shoulder MMT (C5) 5     Rt IR Shoulder R glute* 30 45   Lt IR Shoulder T12     Rt IR Shoulder MMT 4+*     Lt IR Shoulder MMT 5     Rt ER Shoulder R UT* 40 45   Lt ER Shoulder T2     Rt ER Shoulder MMT 4*     Lt ER Shoulder MMT 5          Assessment  Continuing to address thoracic restrictions today with MT and self mobs. Pt demonstrates improved tolerance/participation with prone scap strengthening today. Significant improvements in shoulder AROM noted above. ER and IR most limited.    Goals (to be met in 10 visits)   Goals       Therapy Goals      Not Met Progress Toward Partially Met Met   Pt will improve cervical AROM flexion by 5 to 10 degrees to improve tolerance for looking down to tie shoes. [] [] [] []   Pt will improve cervical AROM extension by 5 to 10 degrees to improve tolerance for putting dishes into overhead cabinets. [] [] [] []   Pt will improve cervical AROM rotation by 5 degrees to improve tolerance for turning  head to check blind spot while driving. [] [] [] []   Pt will have improved thoracic PA mobility to WNL to improve cervical ROM as well as promote upright posturing and decreased pain with work activities. [] [] [] []   Pt will improve shoulder flexion ROM by 10 or more degrees to improve participation in overhead activities.  [] [] [] []   Pt will demonstrate improved cervical intrinsic strength by 1/2 muscle grade to allow improved cervical stabilization with overhead reaching. [] [] [] []    [] [] [] []   Pt will be independent and compliant with comprehensive HEP to maintain progress achieved in PT. [] [] [] []                  Plan  Continue per POC. Focused on improving GHJ mobility. Add posterior GHJ self mob next session.    Treatment Last 4 Visits       1/20/2025 1/24/2025 1/27/2025 1/29/2025   Treatment   Treatment Day  2 3 4   Therapeutic Exercise - HEP   - Chin tucks 5 sec x 15   - LS stretch 15 sec x 5  - Ulnar nerve glides x 15  - Rows RTB x 15  - AAROM #1 WB Flexion x 15  - AAROM #1 WB Abduction x 15   - AAROM Cane ER x 15  - AROM w/ L UE on GHJ x 15  - ER by side YTB x 15  - IR by side YTB x 15     Neuro Re-Ed  - Standing scap retractions x 15   - Rows RTB x 20   - Extensions RTB x 20   - Isoball on wall: cervical retraction & chin tuck 5 sec x 10  - Prone Rows x 15 B  - Prone MT x 15 B (decreased ROM R vs L)  - Prone GHJ extensions x 15 B (decreased ROM R vs L)  - Supine thoracic extension on 1/2 foam w/ arms behind head x 15   - Supine thoracic extension on 1/2 foam w/ arms flexion x 15   - LT setting x 10   - Isoball on Wall: cervical retraction 5 sec x 10   - Isoball on Wall: LF 5 sec x 10 B  - Row GTB x 15  - Extensions RTB x 15  - Prone Rows x 15 B  - Prone MT x 15 B (decreased ROM R vs L)  - Prone GHJ extensions x 15 B (decreased ROM R vs L)  - Supine thoracic extension on 1/2 foam w/ arms behind head x 15   - Supine thoracic extension on 1/2 foam w/ arms flexion x 10   - ER in 90 deg abd YTB  x 20  - IR in 90 deg abd w/ L UE on R GHJ x 20 YTB  - HABD YTB x 15 (back against wall)  - ERIN YTB x 15 (back against wall)     Manual Therapy  - GHJ posterior mob: gr II-III  - GHJ inferior mob: gr II-III  - PROM: all directions (IR most limited) - Thoracic R UPA: T6-T8  - GHJ posterior mob: gr II-III  - GHJ inferior mob: gr II-III  - PROM: all directions (IR most limited)     - Thoracic R UPA: T6-T8  - GHJ posterior mob: gr II-III  - GHJ inferior mob: gr II-III  - PROM: all directions (IR most limited)  - Thoracic CPA: T4-T8   Therapeutic Exercise Min 25 20     Neuro Re-Ed Min  10 30 30   Manual Therapy Min  15 15 15   Evaluation Min 30      Total of Timed Procedures 25 45 45 45   Total of Service Based 30 0 0 0   Total Treatment Time 55 45 45 45         HEP  Access Code: [email or web address]  URL: [email or web address]  Date: 01/29/2025  Prepared by: Markie Rees    Exercises  - Seated Cervical Retraction  - 1 x daily - 7 x weekly - 2 sets - 10 reps -  5 sec hold  - Seated Levator Scapulae Stretch  - 1 x daily - 7 x weekly - 5 reps - 15 - 20 sec hold  - Standing Shoulder Row with Anchored Resistance  - 1 x daily - 7 x weekly - 3 sets - 10 reps  - Supine Shoulder Internal Rotation  - 1 x daily - 7 x weekly - 2 sets - 10 reps  - Supine Shoulder Internal Rotation  - 1 x daily - 7 x weekly - 3 sets - 10 reps  - Supine Shoulder External Rotation Stretch  - 1 x daily - 7 x weekly - 3 sets - 10 reps    Charges     2 NRE: 1 MT

## 2025-02-03 ENCOUNTER — APPOINTMENT (OUTPATIENT)
Dept: PHYSICAL THERAPY | Facility: HOSPITAL | Age: 65
End: 2025-02-03
Attending: FAMILY MEDICINE
Payer: COMMERCIAL

## 2025-02-05 ENCOUNTER — APPOINTMENT (OUTPATIENT)
Dept: PHYSICAL THERAPY | Facility: HOSPITAL | Age: 65
End: 2025-02-05
Attending: FAMILY MEDICINE
Payer: COMMERCIAL

## 2025-02-10 ENCOUNTER — OFFICE VISIT (OUTPATIENT)
Dept: PHYSICAL THERAPY | Facility: HOSPITAL | Age: 65
End: 2025-02-10
Attending: FAMILY MEDICINE
Payer: COMMERCIAL

## 2025-02-10 PROCEDURE — 97140 MANUAL THERAPY 1/> REGIONS: CPT

## 2025-02-10 PROCEDURE — 97112 NEUROMUSCULAR REEDUCATION: CPT

## 2025-02-10 NOTE — PROGRESS NOTES
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 The cribes are low to the ground and bars can not raise or lower, requiring her to roll over edge of crib. Also reaching over head multiple times for diapers and holding babies for 20 min for drinking their bottles. This has caused \"knife\" sensation in the back causing N/T down into arm and hand. She reports R shoulder ROM has improved allowing her to put on deodrant.       Pain: 4/10     Objective  Moderate GHJ posteriorly > inferiorly. Thoracic mobility T5-T8 limited.            Shoulder       1/20/2025 1/24/2025 1/29/2025   Shoulder ROM/MMT   Rt Flexion Shoulder 110*  153   Lt Flexion Shoulder WNL     Rt Flexion Shoulder MMT 3+*     Lt Flexion Shoulder MMT 5     Rt Abduction Shoulder 100 (translates slightly into scaption plane)*  155   Lt Abduction Shoulder WNL     Rt Abduction Shoulder MMT (C5) 4*     Lt Abduction Shoulder MMT (C5) 5     Rt IR Shoulder R glute* 30 45   Lt IR Shoulder T12     Rt IR Shoulder MMT 4+*     Lt IR Shoulder MMT 5     Rt ER Shoulder R UT* 40 45   Lt ER Shoulder T2     Rt ER Shoulder MMT 4*     Lt ER Shoulder MMT 5           Assessment  Thoracic R UPA in T5-T8 distrubtion continues to be hypomobile, as well as, R GHJ, particularly in posterior direction. Provided pt with self mobilizations and MT to address. Followed up with stabilization and strenghtening exercises.    Goals (to be met in 10 visits)   Goals       Therapy Goals       Not Met Progress Toward Partially Met Met   Pt will improve cervical AROM flexion by 5 to 10 degrees to improve tolerance for looking down to tie shoes. [] [] [] []   Pt will improve cervical AROM extension by 5 to 10 degrees to improve tolerance for putting dishes into overhead cabinets. [] [] [] []   Pt will improve cervical AROM rotation by 5 degrees to improve tolerance for turning head to check blind spot while driving. [] [] [] []   Pt will have improved thoracic PA mobility to WNL to improve cervical ROM as well as promote upright posturing and decreased pain with work activities. [] [] [] []   Pt will improve shoulder flexion ROM by 10 or more degrees to improve participation in overhead activities.  [] [] [] []   Pt will demonstrate improved cervical intrinsic strength by 1/2 muscle grade to allow improved cervical stabilization with overhead reaching. [] [] [] []    [] [] [] []   Pt will be independent and compliant with comprehensive HEP to maintain progress achieved in PT. [] [] [] []                  Plan  Continue per POC. Focused on improving GHJ mobility. Add self thoracic mobs, if pt finds out if she has a foam roller.    Treatment Last 4 Visits       1/24/2025 1/27/2025 1/29/2025 2/10/2025   PT Treatment   Treatment Day 2 3 4 5   Therapeutic Exercise - AAROM #1 WB Flexion x 15  - AAROM #1 WB Abduction x 15   - AAROM Cane ER x 15  - AROM w/ L UE on GHJ x 15  - ER by side YTB x 15  - IR by side YTB x 15      Neuro Re-Ed - Standing scap retractions x 15   - Rows RTB x 20   - Extensions RTB x 20   - Isoball on wall: cervical retraction & chin tuck 5 sec x 10  - Prone Rows x 15 B  - Prone MT x 15 B (decreased ROM R vs L)  - Prone GHJ extensions x 15 B (decreased ROM R vs L)  - Supine thoracic extension on 1/2 foam w/ arms behind head x 15   - Supine thoracic extension on 1/2 foam w/ arms flexion x 15   - LT setting x 10   - Isoball on Wall: cervical retraction 5 sec x 10   - Isoball on Wall: LF 5  sec x 10 B  - Row GTB x 15  - Extensions RTB x 15  - Prone Rows x 15 B  - Prone MT x 15 B (decreased ROM R vs L)  - Prone GHJ extensions x 15 B (decreased ROM R vs L)  - Supine thoracic extension on 1/2 foam w/ arms behind head x 15   - Supine thoracic extension on 1/2 foam w/ arms flexion x 10   - ER in 90 deg abd YTB x 20  - IR in 90 deg abd w/ L UE on R GHJ x 20 YTB  - HABD YTB x 15 (back against wall)  - ERIN YTB x 15 (back against wall)   - Prone flies x 15  - Prone bent rows x 15  - Prone butterfly x 15  - Prone 90 deg abd and ER x 15  - 1/2 foam: self thoracic and butterfly x 15  - 1/2 foam: self thoracic and I x 15  - Supine ER AROM in 90 abd x 15   - Supine IR AROM in 90 abd w/ L hand on R GHJ for NM retraining x 15  - Self GHJ posterior mob 15 sec x 5  - Seated butterfly positions, thoracic extensions seated x 5   Manual Therapy - GHJ posterior mob: gr II-III  - GHJ inferior mob: gr II-III  - PROM: all directions (IR most limited) - Thoracic R UPA: T6-T8  - GHJ posterior mob: gr II-III  - GHJ inferior mob: gr II-III  - PROM: all directions (IR most limited)     - Thoracic R UPA: T6-T8  - GHJ posterior mob: gr II-III  - GHJ inferior mob: gr II-III  - PROM: all directions (IR most limited)  - Thoracic CPA: T4-T8 - Thoracic R UPA: T5-T8  - GHJ posterior mob: gr II-III  - GHJ inferior mob: gr II-III  - PROM: all directions (IR most limited)  - Thoracic CPA: T4-T8 -       Therapeutic Exercise Min 20      Neuro Re-Ed Min 10 30 30 30   Manual Therapy Min 15 15 15 15   Total of Timed Procedures 45 45 45 45   Total of Service Based 0 0 0 0   Total Treatment Time 45 45 45 45         HEP  Access Code: [email or web address]  URL: [email or web address]  Date: 02/10/2025  Prepared by: Markie Rees    Exercises  - Seated Cervical Retraction  - 1 x daily - 7 x weekly - 2 sets - 10 reps -  5 sec hold  - Seated Levator Scapulae Stretch  - 1 x daily - 7 x weekly - 5 reps - 15 - 20 sec hold  - Standing Shoulder Row with  Anchored Resistance  - 1 x daily - 7 x weekly - 3 sets - 10 reps  - Supine Shoulder Internal Rotation  - 1 x daily - 7 x weekly - 2 sets - 10 reps  - Supine Shoulder Internal Rotation  - 1 x daily - 7 x weekly - 3 sets - 10 reps  - Supine Shoulder External Rotation Stretch  - 1 x daily - 7 x weekly - 3 sets - 10 reps  - Seated Thoracic Lumbar Extension with Pectoralis Stretch  - 1 x daily - 7 x weekly - 3 sets - 10 reps    Charges     2 NRE: 1 MT

## 2025-02-12 ENCOUNTER — OFFICE VISIT (OUTPATIENT)
Dept: PHYSICAL THERAPY | Facility: HOSPITAL | Age: 65
End: 2025-02-12
Attending: FAMILY MEDICINE
Payer: COMMERCIAL

## 2025-02-12 PROCEDURE — 97140 MANUAL THERAPY 1/> REGIONS: CPT

## 2025-02-12 PROCEDURE — 97112 NEUROMUSCULAR REEDUCATION: CPT

## 2025-02-12 NOTE — PROGRESS NOTES
Patient: Gila Neri (64 year old, female) Referring Provider:  Insurance:   Diagnosis: Primary osteoarthritis of right shoulder (M19.011)  Cervical radiculopathy (M54.12) [employer]   Date of Surgery: No data recorded Next MD visit:  N/A   Precautions:  None No data recorded Referral Information:    Date of Evaluation: Req: 0, Auth: 0, Exp:     01/20/25 POC Auth Visits:  10       Today's Date   2/12/2025    Subjective  Pt reports she feels sore in R cervical region and scapular region. She reports \"knife\" in the back feeling. Reports R N/T in ulnar distribution.       Pain: 4/10     Objective  Moderate GHJ posteriorly > inferiorly. Thoracic mobility T5-T8 improved.               Shoulder       1/20/2025 1/24/2025 1/29/2025   Shoulder ROM/MMT   Rt Flexion Shoulder 110*  153   Lt Flexion Shoulder WNL     Rt Flexion Shoulder MMT 3+*     Lt Flexion Shoulder MMT 5     Rt Abduction Shoulder 100 (translates slightly into scaption plane)*  155   Lt Abduction Shoulder WNL     Rt Abduction Shoulder MMT (C5) 4*     Lt Abduction Shoulder MMT (C5) 5     Rt IR Shoulder R glute* 30 45   Lt IR Shoulder T12     Rt IR Shoulder MMT 4+*     Lt IR Shoulder MMT 5     Rt ER Shoulder R UT* 40 45   Lt ER Shoulder T2     Rt ER Shoulder MMT 4*     Lt ER Shoulder MMT 5               Assessment  Thoracic R UPA in T5-T8 distrubtion has improved. GHJ continues to be hypomobile in both posterior and inferiror direction. Today, \"knife\" pain remains unchanged, with MT and/or exercises.    Goals (to be met in 10 visits)   Goals       Therapy Goals      Not Met Progress Toward Partially Met Met   Pt will improve cervical AROM flexion by 5 to 10 degrees to improve tolerance for looking down to tie shoes. [] [] [] []   Pt will improve cervical AROM extension by 5 to 10 degrees to improve tolerance for putting dishes into overhead cabinets. [] [] [] []   Pt will improve cervical AROM rotation by 5 degrees to improve tolerance for  turning head to check blind spot while driving. [] [] [] []   Pt will have improved thoracic PA mobility to WNL to improve cervical ROM as well as promote upright posturing and decreased pain with work activities. [] [] [] []   Pt will improve shoulder flexion ROM by 10 or more degrees to improve participation in overhead activities.  [] [] [] []   Pt will demonstrate improved cervical intrinsic strength by 1/2 muscle grade to allow improved cervical stabilization with overhead reaching. [] [] [] []    [] [] [] []   Pt will be independent and compliant with comprehensive HEP to maintain progress achieved in PT. [] [] [] []                  Plan  Continue per POC. Focused on improving GHJ mobility.    Treatment Last 4 Visits       1/27/2025 1/29/2025 2/10/2025 2/12/2025   PT Treatment   Treatment Day 3 4 5 6   Neuro Re-Ed - Prone Rows x 15 B  - Prone MT x 15 B (decreased ROM R vs L)  - Prone GHJ extensions x 15 B (decreased ROM R vs L)  - Supine thoracic extension on 1/2 foam w/ arms behind head x 15   - Supine thoracic extension on 1/2 foam w/ arms flexion x 15   - LT setting x 10   - Isoball on Wall: cervical retraction 5 sec x 10   - Isoball on Wall: LF 5 sec x 10 B  - Row GTB x 15  - Extensions RTB x 15  - Prone Rows x 15 B  - Prone MT x 15 B (decreased ROM R vs L)  - Prone GHJ extensions x 15 B (decreased ROM R vs L)  - Supine thoracic extension on 1/2 foam w/ arms behind head x 15   - Supine thoracic extension on 1/2 foam w/ arms flexion x 10   - ER in 90 deg abd YTB x 20  - IR in 90 deg abd w/ L UE on R GHJ x 20 YTB  - HABD YTB x 15 (back against wall)  - ERIN YTB x 15 (back against wall)   - Prone flies x 15  - Prone bent rows x 15  - Prone butterfly x 15  - Prone 90 deg abd and ER x 15  - 1/2 foam: self thoracic and butterfly x 15  - 1/2 foam: self thoracic and I x 15  - Supine ER AROM in 90 abd x 15   - Supine IR AROM in 90 abd w/ L hand on R GHJ for NM retraining x 15  - Self GHJ posterior mob 15 sec x 5  -  Seated butterfly positions, thoracic extensions seated x 5 - Prone 4-way x 15 B  - Dual tennis balls at upper thoracic: I, Y, T x 10   - Extensions GTB x 15  - Rows GTB x 15  - ER by side GTB x 15  - IR by side GTB x 15  - Posterior self GHJ mob w/ green power band: 15 sec x 5   Manual Therapy - Thoracic R UPA: T6-T8  - GHJ posterior mob: gr II-III  - GHJ inferior mob: gr II-III  - PROM: all directions (IR most limited)     - Thoracic R UPA: T6-T8  - GHJ posterior mob: gr II-III  - GHJ inferior mob: gr II-III  - PROM: all directions (IR most limited)  - Thoracic CPA: T4-T8 - Thoracic R UPA: T5-T8  - GHJ posterior mob: gr II-III  - GHJ inferior mob: gr II-III  - PROM: all directions (IR most limited)  - Thoracic CPA: T4-T8 -     - Thoracic R UPA: T5-T8  - GHJ posterior mob: gr II-III  - GHJ inferior mob: gr II-III  - PROM: all directions (IR most limited)  - Thoracic CPA: T4-T8 -  - STM: Thoracic PS          Neuro Re-Ed Min 30 30 30 30   Manual Therapy Min 15 15 15 15   Total of Timed Procedures 45 45 45 45   Total of Service Based 0 0 0 0   Total Treatment Time 45 45 45 45         HEP  Access Code: [email or web address]  URL: [email or web address]  Date: 02/10/2025  Prepared by: Markie Rees    Exercises  - Seated Cervical Retraction  - 1 x daily - 7 x weekly - 2 sets - 10 reps -  5 sec hold  - Seated Levator Scapulae Stretch  - 1 x daily - 7 x weekly - 5 reps - 15 - 20 sec hold  - Standing Shoulder Row with Anchored Resistance  - 1 x daily - 7 x weekly - 3 sets - 10 reps  - Supine Shoulder Internal Rotation  - 1 x daily - 7 x weekly - 2 sets - 10 reps  - Supine Shoulder Internal Rotation  - 1 x daily - 7 x weekly - 3 sets - 10 reps  - Supine Shoulder External Rotation Stretch  - 1 x daily - 7 x weekly - 3 sets - 10 reps  - Seated Thoracic Lumbar Extension with Pectoralis Stretch  - 1 x daily - 7 x weekly - 3 sets - 10 reps    Charges     2 NRE: 1 MT

## 2025-02-19 ENCOUNTER — TELEPHONE (OUTPATIENT)
Dept: PHYSICAL THERAPY | Facility: HOSPITAL | Age: 65
End: 2025-02-19

## 2025-02-21 ENCOUNTER — OFFICE VISIT (OUTPATIENT)
Dept: PHYSICAL THERAPY | Facility: HOSPITAL | Age: 65
End: 2025-02-21
Attending: FAMILY MEDICINE
Payer: COMMERCIAL

## 2025-02-21 PROCEDURE — 97112 NEUROMUSCULAR REEDUCATION: CPT

## 2025-02-21 PROCEDURE — 97140 MANUAL THERAPY 1/> REGIONS: CPT

## 2025-02-21 NOTE — PROGRESS NOTES
Patient: Gila Neri (64 year old, female) Referring Provider:  Insurance:   Diagnosis: Primary osteoarthritis of right shoulder (M19.011)  Cervical radiculopathy (M54.12) [employer]   Date of Surgery: No data recorded Next MD visit:  N/A   Precautions:  None No data recorded Referral Information:    Date of Evaluation: Req: 0, Auth: 0, Exp:     01/20/25 POC Auth Visits:  10       Today's Date   2/21/2025    Subjective  Pt reports bilateral deltoid pain. She reports pain and fatigue in deltoids with daily activites, such as driving, holding a coffee mug. She reports continue R cervical pain and \"knife\" like pain in R posterior shoulder. 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       Pain: 4/10     Objective  See flow chart.            Shoulder       1/24/2025 1/29/2025 2/21/2025   Shoulder ROM/MMT   Rt Flexion Shoulder  153 155   Rt Abduction Shoulder  155 120   Rt IR Shoulder 30 45 R glute   Rt ER Shoulder 40 45 C7         Assessment  Significant IR of R shoulder in supine. Retraining of bilateral scap region focused on today. Post STM and thoracic mobs, \"knife\" like pain in R scap diminishes. Fatigue/Pain in deltoids with thoracic rotations on wall.    Goals (to be met in 10 visits)   Goals       Therapy Goals      Not Met Progress Toward Partially Met Met   Pt will improve cervical AROM flexion by 5 to 10 degrees to improve tolerance for looking down to tie shoes. [] [] [] []   Pt will improve cervical AROM extension by 5 to 10 degrees to improve tolerance for putting dishes into overhead cabinets. [] [] [] []   Pt will improve cervical AROM rotation by 5 degrees to improve tolerance for turning head to check blind spot while driving. [] [] [] []   Pt will have improved thoracic PA mobility to WNL to improve cervical ROM as well as promote upright posturing and decreased pain with work activities. [] [] [] []   Pt will improve shoulder flexion ROM by 10 or more  degrees to improve participation in overhead activities.  [] [] [] []   Pt will demonstrate improved cervical intrinsic strength by 1/2 muscle grade to allow improved cervical stabilization with overhead reaching. [] [] [] []    [] [] [] []   Pt will be independent and compliant with comprehensive HEP to maintain progress achieved in PT. [] [] [] []                  Plan  Continue per POC. Focus on scap retraining.    Treatment Last 4 Visits       1/29/2025 2/10/2025 2/12/2025 2/21/2025   PT Treatment   Treatment Day 4 5 6 7   Neuro Re-Ed - Prone Rows x 15 B  - Prone MT x 15 B (decreased ROM R vs L)  - Prone GHJ extensions x 15 B (decreased ROM R vs L)  - Supine thoracic extension on 1/2 foam w/ arms behind head x 15   - Supine thoracic extension on 1/2 foam w/ arms flexion x 10   - ER in 90 deg abd YTB x 20  - IR in 90 deg abd w/ L UE on R GHJ x 20 YTB  - HABD YTB x 15 (back against wall)  - ERIN YTB x 15 (back against wall)   - Prone flies x 15  - Prone bent rows x 15  - Prone butterfly x 15  - Prone 90 deg abd and ER x 15  - 1/2 foam: self thoracic and butterfly x 15  - 1/2 foam: self thoracic and I x 15  - Supine ER AROM in 90 abd x 15   - Supine IR AROM in 90 abd w/ L hand on R GHJ for NM retraining x 15  - Self GHJ posterior mob 15 sec x 5  - Seated butterfly positions, thoracic extensions seated x 5 - Prone 4-way x 15 B  - Dual tennis balls at upper thoracic: I, Y, T x 10   - Extensions GTB x 15  - Rows GTB x 15  - ER by side GTB x 15  - IR by side GTB x 15  - Posterior self GHJ mob w/ green power band: 15 sec x 5 - Posterior shoulder rolls x 10 (reproduced \"knife\" in the back pain)  - Scap setting (cueing for down and back) 3 sec x 15  - Chest press with SA 4 lb x 20   - Prone scap retraction x 15  - Prone MT flies x 15  - Standing at raised table: thread the kneedle modified vs x 10 B  - SA shrugs x 15  - Thoracic rotation on the wall x 5 B    Manual Therapy - Thoracic R UPA: T6-T8  - GHJ posterior mob: gr  II-III  - GHJ inferior mob: gr II-III  - PROM: all directions (IR most limited)  - Thoracic CPA: T4-T8 - Thoracic R UPA: T5-T8  - GHJ posterior mob: gr II-III  - GHJ inferior mob: gr II-III  - PROM: all directions (IR most limited)  - Thoracic CPA: T4-T8 -     - Thoracic R UPA: T5-T8  - GHJ posterior mob: gr II-III  - GHJ inferior mob: gr II-III  - PROM: all directions (IR most limited)  - Thoracic CPA: T4-T8 -  - STM: Thoracic PS        - GHJ Gr: II-III Posterior and Inferior   - PROM: ER/IR at 90 deg abd  - STM: R upper thoracic PS/Traps  - Thoracic CPA: T5-T8  - Cervical distraction: no change in bilateral deltoid sx & an increase in R posterior scap pain: discontinued  - L cervical SG: increase in N/T in R: discontinued   Neuro Re-Ed Min 30 30 30 28   Manual Therapy Min 15 15 15 17   Total of Timed Procedures 45 45 45 45   Total of Service Based 0 0 0 0   Total Treatment Time 45 45 45 45         HEP  Access Code: [email or web address]  URL: [email or web address]  Date: 02/10/2025  Prepared by: Markie Rees    Exercises  - Seated Cervical Retraction  - 1 x daily - 7 x weekly - 2 sets - 10 reps -  5 sec hold  - Seated Levator Scapulae Stretch  - 1 x daily - 7 x weekly - 5 reps - 15 - 20 sec hold  - Standing Shoulder Row with Anchored Resistance  - 1 x daily - 7 x weekly - 3 sets - 10 reps  - Supine Shoulder Internal Rotation  - 1 x daily - 7 x weekly - 2 sets - 10 reps  - Supine Shoulder Internal Rotation  - 1 x daily - 7 x weekly - 3 sets - 10 reps  - Supine Shoulder External Rotation Stretch  - 1 x daily - 7 x weekly - 3 sets - 10 reps  - Seated Thoracic Lumbar Extension with Pectoralis Stretch  - 1 x daily - 7 x weekly - 3 sets - 10 reps    Charges     2 NRE: 1 MT

## 2025-02-24 ENCOUNTER — OFFICE VISIT (OUTPATIENT)
Dept: PHYSICAL THERAPY | Facility: HOSPITAL | Age: 65
End: 2025-02-24
Attending: FAMILY MEDICINE
Payer: COMMERCIAL

## 2025-02-24 PROCEDURE — 97112 NEUROMUSCULAR REEDUCATION: CPT

## 2025-02-24 PROCEDURE — 97140 MANUAL THERAPY 1/> REGIONS: CPT

## 2025-02-24 NOTE — PROGRESS NOTES
Patient: Gila Neri (64 year old, female) Referring Provider:  Insurance:   Diagnosis: Primary osteoarthritis of right shoulder (M19.011)  Cervical radiculopathy (M54.12) [employer]   Date of Surgery: No data recorded Next MD visit:  N/A   Precautions:  None No data recorded Referral Information:    Date of Evaluation: Req: 0, Auth: 0, Exp:     01/20/25 POC Auth Visits:  10       Today's Date   2/24/2025    Subjective  Continued \"knife in the back\" feeling and shoulder ROM defictis, making putting on her seat belt challenging. Today her neck is also sore.       Pain: 4/10     Objective  See flow chart.               Shoulder       1/24/2025 1/29/2025 2/21/2025   Shoulder ROM/MMT   Rt Flexion Shoulder  153 155   Rt Abduction Shoulder  155 120   Rt IR Shoulder 30 45 R glute   Rt ER Shoulder 40 45 C7             Assessment  Continuing to focus on limiting anterior roll of R shoulder in sitting and supine. 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 Retraining and strengthening followed mobilization of R shoulder.    Goals (to be met in 10 visits)   Goals       Therapy Goals      Not Met Progress Toward Partially Met Met   Pt will improve cervical AROM flexion by 5 to 10 degrees to improve tolerance for looking down to tie shoes. [] [] [] []   Pt will improve cervical AROM extension by 5 to 10 degrees to improve tolerance for putting dishes into overhead cabinets. [] [] [] []   Pt will improve cervical AROM rotation by 5 degrees to improve tolerance for turning head to check blind spot while driving. [] [] [] []   Pt will have improved thoracic PA mobility to WNL to improve cervical ROM as well as promote upright posturing and decreased pain with work activities. [] [] [] []   Pt will improve shoulder flexion ROM by 10 or more degrees to improve participation in overhead activities.  [] [] [] []   Pt will demonstrate improved cervical intrinsic strength by 1/2 muscle grade to allow  improved cervical stabilization with overhead reaching. [] [] [] []    [] [] [] []   Pt will be independent and compliant with comprehensive HEP to maintain progress achieved in PT. [] [] [] []                  Plan  Continue per POC. Focus on scap retraining and posture.    Treatment Last 4 Visits       2/10/2025 2/12/2025 2/21/2025 2/24/2025   PT Treatment   Treatment Day 5 6 7 8   Neuro Re-Ed - Prone flies x 15  - Prone bent rows x 15  - Prone butterfly x 15  - Prone 90 deg abd and ER x 15  - 1/2 foam: self thoracic and butterfly x 15  - 1/2 foam: self thoracic and I x 15  - Supine ER AROM in 90 abd x 15   - Supine IR AROM in 90 abd w/ L hand on R GHJ for NM retraining x 15  - Self GHJ posterior mob 15 sec x 5  - Seated butterfly positions, thoracic extensions seated x 5 - Prone 4-way x 15 B  - Dual tennis balls at upper thoracic: I, Y, T x 10   - Extensions GTB x 15  - Rows GTB x 15  - ER by side GTB x 15  - IR by side GTB x 15  - Posterior self GHJ mob w/ green power band: 15 sec x 5 - Posterior shoulder rolls x 10 (reproduced \"knife\" in the back pain)  - Scap setting (cueing for down and back) 3 sec x 15  - Chest press with SA 4 lb x 20   - Prone scap retraction x 15  - Prone MT flies x 15  - Standing at raised table: thread the kneedle modified vs x 10 B  - SA shrugs x 15  - Thoracic rotation on the wall x 5 B  - IR in supine w/ L UE on R GHJ for NMRE training x  20   - ER in supine x 20   - Scap setting  (focus on down and back 10' x 10  - Sleeper stretch ER and IR each 5-10 sec x 5 each  - ER by side YTB x 20   - IR by side YTB x 20   - Posterior self shoulder mobilization: 10 to 15 sec x 7  - Pulley's abd: 2'     - Thoracic rotation on wall x 5 B   Manual Therapy - Thoracic R UPA: T5-T8  - GHJ posterior mob: gr II-III  - GHJ inferior mob: gr II-III  - PROM: all directions (IR most limited)  - Thoracic CPA: T4-T8 -     - Thoracic R UPA: T5-T8  - GHJ posterior mob: gr II-III  - GHJ inferior mob: gr II-III  -  PROM: all directions (IR most limited)  - Thoracic CPA: T4-T8 -  - STM: Thoracic PS        - GHJ Gr: II-III Posterior and Inferior   - PROM: ER/IR at 90 deg abd  - STM: R upper thoracic PS/Traps  - Thoracic CPA: T5-T8  - Cervical distraction: no change in bilateral deltoid sx & an increase in R posterior scap pain: discontinued  - L cervical SG: increase in N/T in R: discontinued - PROM: all directions  - GHJ mobs: Gr II-III Posterior and Inferior   Neuro Re-Ed Min 30 30 28 30   Manual Therapy Min 15 15 17 15   Total of Timed Procedures 45 45 45 45   Total of Service Based 0 0 0 0   Total Treatment Time 45 45 45 45         HEP  Access Code: [email or web address]  URL: [email or web address]  Date: 02/10/2025  Prepared by: Markie Rees    Exercises  - Seated Cervical Retraction  - 1 x daily - 7 x weekly - 2 sets - 10 reps -  5 sec hold  - Seated Levator Scapulae Stretch  - 1 x daily - 7 x weekly - 5 reps - 15 - 20 sec hold  - Standing Shoulder Row with Anchored Resistance  - 1 x daily - 7 x weekly - 3 sets - 10 reps  - Supine Shoulder Internal Rotation  - 1 x daily - 7 x weekly - 2 sets - 10 reps  - Supine Shoulder Internal Rotation  - 1 x daily - 7 x weekly - 3 sets - 10 reps  - Supine Shoulder External Rotation Stretch  - 1 x daily - 7 x weekly - 3 sets - 10 reps  - Seated Thoracic Lumbar Extension with Pectoralis Stretch  - 1 x daily - 7 x weekly - 3 sets - 10 reps    Charges     2 NRE: 1 MT

## 2025-03-13 ENCOUNTER — APPOINTMENT (OUTPATIENT)
Dept: PHYSICAL THERAPY | Facility: HOSPITAL | Age: 65
End: 2025-03-13
Attending: FAMILY MEDICINE
Payer: COMMERCIAL

## 2025-03-19 ENCOUNTER — APPOINTMENT (OUTPATIENT)
Dept: PHYSICAL THERAPY | Facility: HOSPITAL | Age: 65
End: 2025-03-19
Attending: FAMILY MEDICINE
Payer: COMMERCIAL

## 2025-03-21 RX ORDER — SPIRONOLACTONE 50 MG/1
50 TABLET, FILM COATED ORAL DAILY
Qty: 90 TABLET | Refills: 0 | Status: SHIPPED | OUTPATIENT
Start: 2025-03-21

## 2025-03-21 NOTE — TELEPHONE ENCOUNTER
Please let her know refill sent to pharmacy but make appointment now for 3 months from now so we can monitor blood pressure and electrolytes on this medication combination.

## 2025-03-21 NOTE — TELEPHONE ENCOUNTER
Please Review       Please review pended refill request as unable to refill due to high/very high interaction warning copied here:    Drug-Drug: spironolactone and lisinoprilHyperkalemia, possibly with cardiac arrhythmias or arrest, may occur with the combination of potassium-sparing diuretics and ACE inhibitors. Serum potassium concentrations and renal function should be monitored. Patients with renal impairment, diabetes, older age, severe heart failure, and risk for dehydration may be at greater risk.  Details  Override reason        spironolactone 50 MG Oral Tab [Pharmacy Med Name: SPIRONOLACTONE 50MG TABLETS]  Prescription. Reordered.  lisinopril 20 MG Oral TabPrescription. Active.

## 2025-03-24 ENCOUNTER — HOSPITAL ENCOUNTER (OUTPATIENT)
Age: 65
Discharge: HOME | End: 2025-03-24
Payer: COMMERCIAL

## 2025-03-24 ENCOUNTER — APPOINTMENT (OUTPATIENT)
Dept: PHYSICAL THERAPY | Facility: HOSPITAL | Age: 65
End: 2025-03-24
Attending: FAMILY MEDICINE
Payer: COMMERCIAL

## 2025-03-24 VITALS
HEART RATE: 116 BPM | DIASTOLIC BLOOD PRESSURE: 77 MMHG | TEMPERATURE: 98 F | SYSTOLIC BLOOD PRESSURE: 145 MMHG | OXYGEN SATURATION: 100 % | RESPIRATION RATE: 20 BRPM

## 2025-03-24 DIAGNOSIS — R19.7 NAUSEA VOMITING AND DIARRHEA: Primary | ICD-10-CM

## 2025-03-24 DIAGNOSIS — R11.2 NAUSEA VOMITING AND DIARRHEA: Primary | ICD-10-CM

## 2025-03-24 DIAGNOSIS — Z11.52 ENCOUNTER FOR SCREENING FOR COVID-19: ICD-10-CM

## 2025-03-24 DIAGNOSIS — E86.0 MILD DEHYDRATION: ICD-10-CM

## 2025-03-24 LAB
POCT INFLUENZA A: NEGATIVE
POCT INFLUENZA B: NEGATIVE
SARS-COV-2 RNA RESP QL NAA+PROBE: NOT DETECTED

## 2025-03-24 PROCEDURE — 87502 INFLUENZA DNA AMP PROBE: CPT | Performed by: NURSE PRACTITIONER

## 2025-03-24 PROCEDURE — S0119 ONDANSETRON 4 MG: HCPCS | Performed by: NURSE PRACTITIONER

## 2025-03-24 PROCEDURE — U0002 COVID-19 LAB TEST NON-CDC: HCPCS | Performed by: NURSE PRACTITIONER

## 2025-03-24 PROCEDURE — 99214 OFFICE O/P EST MOD 30 MIN: CPT | Performed by: NURSE PRACTITIONER

## 2025-03-24 RX ORDER — ONDANSETRON 4 MG/1
4 TABLET, ORALLY DISINTEGRATING ORAL EVERY 4 HOURS PRN
Qty: 10 TABLET | Refills: 0 | Status: SHIPPED | OUTPATIENT
Start: 2025-03-24 | End: 2025-03-31

## 2025-03-24 RX ORDER — DICYCLOMINE HCL 20 MG
20 TABLET ORAL 4 TIMES DAILY PRN
Qty: 15 TABLET | Refills: 0 | Status: SHIPPED | OUTPATIENT
Start: 2025-03-24

## 2025-03-24 RX ORDER — ONDANSETRON 4 MG/1
4 TABLET, ORALLY DISINTEGRATING ORAL ONCE
Status: COMPLETED | OUTPATIENT
Start: 2025-03-24 | End: 2025-03-24

## 2025-03-24 NOTE — ED INITIAL ASSESSMENT (HPI)
Pt states saturday began having nausea diarrhea and vomiting. Pt states symptoms have continued into today. Pt states has been trying otc meds, but symptoms continues. Pt states has been hydrating as much as possible. Pt denies fevers. 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

## 2025-03-25 NOTE — ED PROVIDER NOTES
Patient Seen in: [facility] [place]      History   No chief complaint on file.    Stated Complaint: Flu symptoms    Subjective:   HPI  Patient is a 64-year-old female who presents to the [facility] care center with a concern for nausea, vomiting, diarrhea with mild abdominal cramping that is all been intermittent but persistent for the last 2 days.  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  She feels generally weak, but has had no headache or dizziness.            Objective:     Past Medical History:    Cubital tunnel syndrome, right/SX DATE 5-8-17, CPT 12937, W/GLYNN, GLOBAL EXP 8-6-17    Degenerative TFCC tear, right    Endometriosis    Hearing impairment    BILATERAL HEARING AIDS    High blood pressure    High cholesterol    Lumbar disc herniation    2013 discectomy    Migraines    Neuropathy    Osteoarthritis    Primary osteoarthritis of first carpometacarpal joint of right hand    Rotator cuff syndrome, right    Sx.7/18/16, CPT.96274, R Mid Trigger Finger Release, w/, Global Exp.10/16/16    Trigger index finger of right hand    Visual impairment              Past Surgical History:   Procedure Laterality Date    Back surgery      Bunionectomy Right 09/2021    Carpal tunnel release  2009    [LATERALITY NOT STATED]    Colonoscopy      Colonoscopy N/A 11/05/2019    Procedure: COLONOSCOPY;  Surgeon: Bernardo Sapp MD;  Location: [facility] ENDOSCOPY    Colonoscopy N/A 2/28/2023    Procedure: COLONOSCOPY;  Surgeon: Bernardo Sapp MD;  Location: [facility] ENDOSCOPY    Colonoscopy & polypectomy  2011    colonoscopy with polypectomy, repat in 5 yr; colonic polyps, tubular adenoma    Knee arthroscopy Left 12/02/2022    partial medial and lateral meniscectomy    Other Right     MIDDLE TRIGGER FINGER RELEASE    Other Right     ELBOW ULNAR RELEASE    Percutaneous lumbar diskectomy  2013    discectomy; Lumbar disc herniation                Social History      Socioeconomic History    Marital status:    Tobacco Use    Smoking status: Never     Passive exposure: Never    Smokeless tobacco: Never   Vaping Use    Vaping status: Never Used   Substance and Sexual Activity    Alcohol use: Yes     Comment: rarely    Drug use: No   Other Topics Concern     Service No    Blood Transfusions No    Caffeine Concern Yes     Comment: coffee, > 6 cups daily    Occupational Exposure No    Hobby Hazards No    Sleep Concern No    Stress Concern No    Weight Concern No    Special Diet No    Back Care No    Exercise Yes     Comment: walking    Bike Helmet No    Seat Belt No    Self-Exams No    Pt has a pacemaker No    Pt has a defibrillator No    Reaction to local anesthetic No   Social History Narrative    The patient does not use an assistive device..      The patient does live in a home with stairs.              Review of Systems   Constitutional:  Positive for appetite change and fatigue. Negative for chills and fever.   HENT:  Negative for congestion.    Respiratory:  Negative for shortness of breath.    Cardiovascular:  Negative for chest pain.   Gastrointestinal:  Positive for abdominal pain, diarrhea, nausea and vomiting.   Musculoskeletal:  Negative for arthralgias and myalgias.   Neurological:  Positive for weakness. Negative for dizziness and headaches.       Positive for stated complaint: Flu symptoms  Other systems are as noted in HPI.  Constitutional and vital signs reviewed.      All other systems reviewed and negative except as noted above.    Physical Exam     ED Triage Vitals [03/24/25 1851]   /77   Pulse 116   Resp 20   Temp 98.3 °F (36.8 °C)   Temp src Oral   SpO2 100 %   O2 Device None (Room air)       Current Vitals:   Vital Signs  BP: 145/77  Pulse: 116  Resp: 20  Temp: 98.3 °F (36.8 °C)  Temp src: Oral    Oxygen Therapy  SpO2: 100 %  O2 Device: None (Room air)        Physical Exam  Vitals and nursing note reviewed.   Constitutional:        General: She is not in acute distress.     Appearance: She is not ill-appearing.   Eyes:      Conjunctiva/sclera: Conjunctivae normal.   Pulmonary:      Effort: Pulmonary effort is normal. No respiratory distress.   Abdominal:      Tenderness: There is no abdominal tenderness. There is no guarding.   Skin:     General: Skin is warm and dry.   Neurological:      Mental Status: She is alert and oriented to person, place, and time.   Psychiatric:         Behavior: Behavior normal.             ED Course     Labs Reviewed   RAPID SARS-COV-2 BY PCR - Normal   POCT FLU TEST - Normal    Narrative:     This assay is a rapid molecular in vitro test utilizing nucleic acid amplification of influenza A and B viral RNA.     Medications   ondansetron (Zofran-ODT) disintegrating tab 4 mg (4 mg Oral Given 3/24/25 1929)                   MDM      We discussed strategies for orally rehydrating at home given her prescription for Zofran.  She was also provided prescriptions for dicyclomine and asked to use over-the-counter Imodium as directed.  She states understanding and agrees with plan.            Medical Decision Making  Differential diagnoses considered today include, but are not exclusive of: Viral, bacterial, protozoal causes for acute gastroenteritis; dehydration; acute kidney injury; acute appendicitis, acute pancreatitis, acute cholecystitis.    Problems Addressed:  Mild dehydration: self-limited or minor problem  Nausea vomiting and diarrhea: self-limited or minor problem    Amount and/or Complexity of Data Reviewed  Labs:  Decision-making details documented in ED Course.    Risk  OTC drugs.  Prescription drug management.        Disposition and Plan     Clinical Impression:  1. Nausea vomiting and diarrhea    2. Encounter for screening for COVID-19    3. Mild dehydration         Disposition:  Discharge  3/24/2025  7:28 pm    Follow-up:  Nae Washington MD  [address] [place] [address]  [address]  [phone]    Schedule an appointment as soon as possible for a visit in 1 week  As needed    [facility] Emergency Department  [address]  [facility] [address]  [phone]  Go to   If symptoms worsen          Medications Prescribed:  Current Discharge Medication List        START taking these medications    Details   ondansetron 4 MG Oral Tablet Dispersible Take 1 tablet (4 mg total) by mouth every 4 (four) hours as needed for Nausea.  Qty: 10 tablet, Refills: 0      dicyclomine 20 MG Oral Tab Take 1 tablet (20 mg total) by mouth 4 (four) times daily as needed.  Qty: 15 tablet, Refills: 0                 Supplementary Documentation:

## 2025-03-26 ENCOUNTER — APPOINTMENT (OUTPATIENT)
Dept: PHYSICAL THERAPY | Facility: HOSPITAL | Age: 65
End: 2025-03-26
Attending: FAMILY MEDICINE
Payer: COMMERCIAL

## 2025-03-27 ENCOUNTER — NURSE TRIAGE (OUTPATIENT)
Dept: FAMILY MEDICINE CLINIC | Facility: CLINIC | Age: 65
End: 2025-03-27

## 2025-03-27 ENCOUNTER — PATIENT MESSAGE (OUTPATIENT)
Dept: FAMILY MEDICINE CLINIC | Facility: CLINIC | Age: 65
End: 2025-03-27

## 2025-03-27 NOTE — TELEPHONE ENCOUNTER
Sounds like appropriate treatment at this time.  Can also try Pepto-Bismol tablets or liquid as directed on bottle/box.  Alerted her that Pepto-Bismol may cause black tongue and stools.  Pepto-Bismol can be used in combination with Imodium.

## 2025-03-27 NOTE — TELEPHONE ENCOUNTER
Patient contacted and made aware of Dr. Washington's interpretation and recommendation. Patient verbalized understanding. No further questions or concerns at this time.

## 2025-03-27 NOTE — TELEPHONE ENCOUNTER
Action Requested: Summary for Provider     []  Critical Lab, Recommendations Needed  [x] Need Additional Advice  []   FYI    []   Need Orders  [] Need Medications Sent to Pharmacy  []  Other     SUMMARY: Disposition per protocol  is to home care    Patient was seen at [facility] 3/24/235 for nausea, vomiting and diarrhea. 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  Provider prescribed dicyclomine and ondansetron and recommended Immodium.   Vomiting is gone but diarrhea is persistent despite taking Immodium alternating with  kaopectate, fluids  and Brat diet.   Her appetite is back, and she has no  dizziness, weakness, nausea,vomiting, abdominal pain or fever.  She had 3 loose stools today.    Patient can't return to work until she is  free of diarrhea for at least 24 hours.    Dr Washington, Please Advise. Patient asks if you have any additional recommendations? There are no available appointments at your office until  next week.  Reason for call: Diarrhea  Onset: 3/23/25      Patient calling,verified name and date of birth. Asking if Dr Washington has additional recommendations for her. Reviewed care advice including call back if diarrhea increases. Patient verbalizes understanding and agrees to plan of care.  r    Reason for Disposition   MILD-MODERATE diarrhea (e.g., 1-6 times / day more than normal)    Protocols used: Diarrhea-A-OH

## 2025-03-31 ENCOUNTER — OFFICE VISIT (OUTPATIENT)
Dept: PHYSICAL MEDICINE AND REHAB | Facility: CLINIC | Age: 65
End: 2025-03-31
Payer: COMMERCIAL

## 2025-03-31 VITALS — HEIGHT: 65 IN | WEIGHT: 177 LBS | BODY MASS INDEX: 29.49 KG/M2

## 2025-03-31 DIAGNOSIS — M54.12 CERVICAL RADICULOPATHY AT C8: Primary | ICD-10-CM

## 2025-03-31 DIAGNOSIS — M54.2 NECK PAIN ON RIGHT SIDE: ICD-10-CM

## 2025-03-31 DIAGNOSIS — M67.911 ROTATOR CUFF DISORDER, RIGHT: ICD-10-CM

## 2025-03-31 PROCEDURE — 99214 OFFICE O/P EST MOD 30 MIN: CPT | Performed by: PHYSICAL MEDICINE & REHABILITATION

## 2025-03-31 RX ORDER — CYCLOBENZAPRINE HCL 10 MG
10 TABLET ORAL NIGHTLY
Qty: 30 TABLET | Refills: 0 | Status: SHIPPED | OUTPATIENT
Start: 2025-03-31

## 2025-03-31 NOTE — PROGRESS NOTES
RETURN PATIENT VISIT    CHIEF COMPLAINT  Right shoulder pain  Back and neck pain follow up     INTERVAL HISTORY  Gila Neri is a 64 year old who was last seen in clinic on 1/7/25. She is following up on neck, back and shoulder pain on the right side. Pain is sharp and constant, with N/T in the right hand. Uses tylenol or motrin. Was sent to PT and states that this made the pain worse.   History of Present Illness  The patient, with a history of calcific tendonitis and cervical spine degeneration, presents with persistent shoulder pain and discomfort in the shoulder blade. The pain has not improved with physical therapy, and in fact, the patient reports that the therapy exacerbated the discomfort. The patient describes the pain in the shoulder blade as a constant throbbing sensation, which is more bothersome than the shoulder pain itself. The patient also reports occasional use of over-the-counter pain relievers and Biofreeze for pain management. The patient is currently on meloxicam, but does not find it significantly beneficial.      REVIEW OF SYSTEMS  Review of systems was completed with the patient today as pertinent to today's visit    PHYSICAL EXAMINATION  CONSTITUTIONAL: Well-appearing, in no apparent distress  EYES: No scleral icterus or conjunctival hemorrhage  CARDIOVASCULAR: Skin warm and well-perfused, no peripheral edema  RESPIRATORY: Breathing unlabored without accessory muscle use  PSYCHIATRIC: Alert, cooperative, appropriate mood and affect  SKIN: No lesions or rashes on exposed skin  MUSCULOSKELETAL:   Physical Exam  MUSCULOSKELETAL: Right shoulder elevated compared to left. Weakness in right supraspinatus. Discomfort with right lateral neck flexion and rotation. Tenderness in right supraspinatus, trapezius, and periscapular muscles.      REVIEW OF PRIOR X-RAYS/STUDIES  Independently reviewed the XR of the cervical spine dated 1/7/25. This reveals multilevel degenerative changes most notably  at C6/7, potential subluxation at C4/5.     IMPRESSION/DIAGNOSIS  1.   Encounter Diagnoses   Name Primary?    Cervical radiculopathy at C8 Yes    Neck pain on right side     Rotator cuff disorder, right          TREATMENT/PLAN  Assessment & Plan  Cervical radiculopathy  Chronic neck pain radiating to the shoulder blade and arm, likely due to cervical spine degeneration. Imaging indicates degeneration in the lower cervical spine, correlating with symptoms in the fingers, and possible instability at C4-5. Symptoms suggest cervical radiculopathy affecting the C7 and C8 nerve roots. Physical therapy was not tolerated, necessitating more aggressive management. An MRI of the cervical spine is warranted to differentiate between neck and shoulder pathology due to persistent symptoms.  - Order MRI of the cervical spine  - Prescribe muscle relaxant for nighttime use to aid muscle relaxation and sleep  - Consider further intervention based on MRI results    Shoulder pain  Chronic right shoulder pain with tenderness in the supraspinatus, trapezius, and periscapular muscles. Weakness in the supraspinatus muscle. Pain exacerbated by right arm use, leading to compensatory left side use. Shoulder pain may be secondary to or exacerbated by cervical spine issues. Plan to address neck first to assess impact on shoulder symptoms.  - Evaluate shoulder after MRI results to determine need for further intervention    Medication management  Currently taking meloxicam with minimal relief. Occasionally uses ibuprofen, acetaminophen, and Biofreeze for pain. Previously used a muscle relaxant for TMJ, which was well-tolerated. Discontinue meloxicam due to lack of efficacy and prescribe muscle relaxant to improve muscle relaxation and sleep quality.  - Discontinue meloxicam  - Prescribe muscle relaxant for nighttime use    Follow-up  Plan to discuss MRI results and determine further management based on findings. Recommend [facility] for  faster scheduling and direct integration of images into the medical record system.  - Schedule follow-up appointment after MRI results are available  - Consider using [facility] for MRI scheduling if hospital scheduling is delayed      Education was provided regarding the above impression/diagnosis and treatment options/plan were discussed.  All questions were answered during today's visit.  Patient will contact clinic if any other questions or concerns.          Brijesh Ramirez DO  Interventional Spine and Sports Medicine Specialist   Physical Medicine and Rehabilitation  [place] [id number] [place] [id number]    [address] [place] [address]

## 2025-04-30 ENCOUNTER — HOSPITAL ENCOUNTER (OUTPATIENT)
Dept: MRI IMAGING | Facility: HOSPITAL | Age: 65
Discharge: HOME | End: 2025-04-30
Attending: PHYSICAL MEDICINE & REHABILITATION
Payer: COMMERCIAL

## 2025-04-30 DIAGNOSIS — M54.12 CERVICAL RADICULOPATHY AT C8: ICD-10-CM

## 2025-04-30 PROCEDURE — 72141 MRI NECK SPINE W/O DYE: CPT | Performed by: PHYSICAL MEDICINE & REHABILITATION

## 2025-05-08 ENCOUNTER — PATIENT MESSAGE (OUTPATIENT)
Dept: PHYSICAL MEDICINE AND REHAB | Facility: CLINIC | Age: 65
End: 2025-05-08

## 2025-05-12 NOTE — TELEPHONE ENCOUNTER
Per Dr. Ramirez: \"Please notify the patient of results and set up for video visit to discuss. MRI of the cervical spine reveals degenerative change in the mid and lower part of the cervical spine there is some foraminal narrowing around the nerve roots exiting the spine on the right side at C4-5 and C6-7 bilaterally.\"    RN called and informed patient of results. Patient agreeable to video visit 5/27/25 with Dr. Ramirez. Appointment scheduled per protocol. Nothing further needed at this time.

## 2025-05-15 RX ORDER — CYCLOBENZAPRINE HCL 10 MG
10 TABLET ORAL NIGHTLY
Qty: 30 TABLET | Refills: 0 | Status: SHIPPED | OUTPATIENT
Start: 2025-05-15

## 2025-05-15 NOTE — TELEPHONE ENCOUNTER
Per patient comment: \"Since the soonest video visit I could schedule is on 5/27/25 Im wondering if I can get a refill to help with the shoulder/back pain. I have trouble sleeping because of the shoulder/back pain.\"    Refill Request    Medication request: cyclobenzaprine 10 MG Oral Tab  Take 1 tablet (10 mg total) by mouth nightly.     LOV:3/31/2025 Brijesh Ramirez DO   Due back to clinic per last office note:  \"Schedule follow-up appointment after MRI results are available \"  NOV: 5/27/2025 Brijesh Ramirez DO      [place]: 3/31/25 #30 - 30 day supply    Urine drug screen (if applicable): n/a  Pain contract: n/a    LOV plan (if weaning or changing medications): Per  at last office visit: \"Currently taking meloxicam with minimal relief. Occasionally uses ibuprofen, acetaminophen, and Biofreeze for pain. Previously used a muscle relaxant for TMJ, which was well-tolerated. Discontinue meloxicam due to lack of efficacy and prescribe muscle relaxant to improve muscle relaxation and sleep quality.  - Discontinue meloxicam  - Prescribe muscle relaxant for nighttime use\"

## 2025-05-27 ENCOUNTER — TELEPHONE (OUTPATIENT)
Dept: PHYSICAL MEDICINE AND REHAB | Facility: CLINIC | Age: 65
End: 2025-05-27

## 2025-05-27 ENCOUNTER — TELEMEDICINE (OUTPATIENT)
Dept: PHYSICAL MEDICINE AND REHAB | Facility: CLINIC | Age: 65
End: 2025-05-27
Payer: COMMERCIAL

## 2025-05-27 DIAGNOSIS — M54.12 CERVICAL RADICULOPATHY: Primary | ICD-10-CM

## 2025-05-27 PROCEDURE — 98006 SYNCH AUDIO-VIDEO EST MOD 30: CPT | Performed by: PHYSICAL MEDICINE & REHABILITATION

## 2025-05-27 NOTE — TELEPHONE ENCOUNTER
Initiated authorization for C7/T1 ILESI with fluoroscopic guidance. CPT/HCPCS 94851 dx:M54.12 to be done at [facility] with [employer] portal.    Status: Approved  Reference/Authorization # [id number]  Valid: 5/7/25-11/23/25  Authorization is not a guarantee of payment and may be subject to review once claim is submitted.     PLEASE INFORM THE PATIENT TO CONTACT THE OFFICE IF THE INSURANCE CHANGES AS HE/SHE IS RESPONSIBLE TO UPDATE THE OFFICE IF INSURANCE CHANGES SO THAT PROCEDURE IS BILLED CORRECTLY.

## 2025-05-27 NOTE — PROGRESS NOTES
[facility]    Telemedicine Visit - Follow Up Evaluation    Telehealth Verbal Consent   I conducted a telehealth visit with Gila Neri today, 05/27/25, which was completed using two-way, real-time interactive audio and video communication.The patient was made aware of the limitations of the telehealth visit, including treatment limitations as no physical exam could be performed.  The patient was advised to call 911 or to go to the ER in case there was an emergency.  The patient was also advised of the potential privacy & security concerns related to the telehealth platform.   The patient was made aware of where to find [facility]'s notice of privacy practices, telehealth consent form and other related consent forms and documents.  which are located on the [facility] website. The patient verbally agreed to telehealth consent form, related consents and the risks discussed.    Lastly, the patient confirmed that they were in [place].   Included in this visit, time may have been spent reviewing labs, medications, radiology tests and decision making. Appropriate medical decision-making and tests are ordered as detailed in the plan of care above.  Coding/billing information is submitted for this visit based on complexity of care and/or time spent for the visit.    Chief Complaint: Sharp neck and right arm radicular pain    HISTORY OF PRESENT ILLNESS:   The patient is a 64 year old female with right-sided neck and right arm radicular pain here for MRI review.  She states that she is still having a pain in the upper back/neck with sharp sensation in the neck, she has pain in the shoulder she will feel numbness and tingling in in the 4th and 5th digits on the right hand.     CURRENT MEDICATIONS:   Current Medications[1]      ALLERGIES:   Allergies[2]      REVIEW OF SYSTEMS:   A comprehensive 10 point review of systems was completed.  Pertinent positives and negatives noted in the  the HPI.      PHYSICAL EXAM:   General: No immediate distress  Head: Normocephalic/ Atraumatic  Eyes: Extra-occular movements intact  Ears/Nose/Throat:  External appearance identifies normal appearance without obvious deformity  Cardiovascular: No cyanosis, clubbing or edema  Respiratory: Non-labored respirations  Skin: No lesions noted   Neurological: alert & oriented x 3, attentive, able to follow commands, comprehention intact, spontaneous speech intact  Psychiatric: Mood and affect appropriate    Musculoskeletal Exam:    Physical exam by video visit over patient sitting comfortably on the call in no distress, endorses stable strength and strength in the upper extremities.      IMAGING:   Independently reviewed the MRI of the cervical spine dated 4/30/2025, reviewed these images with the patient.  These reveal arthropathy and foraminal stenosis at C4-5 and left C6-7 with disc bulging at C5-6 and C6-7.    All imaging results were reviewed and discussed with patient.      ASSESSMENT:     1. Cervical radiculopathy          PLAN:     Plan for C7-T1 interlaminar epidural steroid injection fluoroscopic with local anesthesia.    Patient to follow-up for the above injection then to weeks after.    The patient verbalized understanding with this plan and was in agreement.  There are no barriers to learning.  All questions were answered.    Duration of the service: 16 minutes        Brijesh Ramirez DO  Physical Medicine and Rehabilitation  Interventional Spine and Sports Medicine   [facility]              [1]   Current Outpatient Medications   Medication Sig Dispense Refill    cyclobenzaprine 10 MG Oral Tab Take 1 tablet (10 mg total) by mouth nightly. 30 tablet 0    dicyclomine 20 MG Oral Tab Take 1 tablet (20 mg total) by mouth 4 (four) times daily as needed. 15 tablet 0    spironolactone 50 MG Oral Tab Take 1 tablet (50 mg total) by mouth daily. 90 tablet 0    montelukast 10 MG Oral Tab Take 1  tablet (10 mg total) by mouth nightly. 90 tablet 3    lisinopril 20 MG Oral Tab Take 1 tablet (20 mg total) by mouth daily. 90 tablet 3    Meloxicam 15 MG Oral Tab Take 1 tablet (15 mg total) by mouth daily. 90 tablet 3    atorvastatin 40 MG Oral Tab Take 1 tablet (40 mg total) by mouth nightly. 90 tablet 3    albuterol 108 (90 Base) MCG/ACT Inhalation Aero Soln Inhale 1 puff into the lungs every 4 (four) hours as needed. 1 each 0    triamcinolone 0.1 % External Ointment Apply to affected area twice a day 30 g 0    Multiple Vitamins-Minerals (MULTI-VITAMIN GUMMIES) Oral Chew Tab       Vitamin D3 (VITAMIN D3) 1000 UNITS Oral Tab Take 1 tablet (1,000 Units total) by mouth daily.     [2]   Allergies  Allergen Reactions    Bronopol SWELLING    Darvocet [Propoxyphene N-Apap] RASH and SWELLING    Penicillins RASH    Sulfa Antibiotics RASH    Sulfanilamide RASH    Mastisol Adhesive RASH     Blisters from skin glue    Other OTHER (SEE COMMENTS)     Adhesive/suture glue -blisters

## 2025-05-28 ENCOUNTER — LAB ENCOUNTER (OUTPATIENT)
Dept: LAB | Age: 65
End: 2025-05-28
Attending: FAMILY MEDICINE
Payer: COMMERCIAL

## 2025-05-28 ENCOUNTER — OFFICE VISIT (OUTPATIENT)
Dept: FAMILY MEDICINE CLINIC | Facility: CLINIC | Age: 65
End: 2025-05-28
Payer: COMMERCIAL

## 2025-05-28 VITALS
WEIGHT: 281 LBS | DIASTOLIC BLOOD PRESSURE: 83 MMHG | SYSTOLIC BLOOD PRESSURE: 123 MMHG | OXYGEN SATURATION: 98 % | HEART RATE: 97 BPM | BODY MASS INDEX: 47 KG/M2

## 2025-05-28 DIAGNOSIS — Z12.31 VISIT FOR SCREENING MAMMOGRAM: ICD-10-CM

## 2025-05-28 DIAGNOSIS — R25.1 TREMOR: ICD-10-CM

## 2025-05-28 DIAGNOSIS — M54.12 CERVICAL RADICULOPATHY: ICD-10-CM

## 2025-05-28 DIAGNOSIS — I10 ESSENTIAL HYPERTENSION: Primary | ICD-10-CM

## 2025-05-28 DIAGNOSIS — Z20.828 EXPOSURE TO VIRAL DISEASE: ICD-10-CM

## 2025-05-28 DIAGNOSIS — J45.20 MILD INTERMITTENT ASTHMA WITHOUT COMPLICATION (HCC): ICD-10-CM

## 2025-05-28 LAB — TSI SER-ACNC: 1.59 UIU/ML (ref 0.55–4.78)

## 2025-05-28 PROCEDURE — 84443 ASSAY THYROID STIM HORMONE: CPT

## 2025-05-28 PROCEDURE — 86765 RUBEOLA ANTIBODY: CPT

## 2025-05-28 PROCEDURE — 36415 COLL VENOUS BLD VENIPUNCTURE: CPT

## 2025-05-28 PROCEDURE — 99214 OFFICE O/P EST MOD 30 MIN: CPT | Performed by: FAMILY MEDICINE

## 2025-05-28 NOTE — PROGRESS NOTES
The following individual(s) verbally consented to be recorded using ambient AI listening technology and understand that they can each withdraw their consent to this listening technology at any point by asking the clinician to turn off or pause the recording:    Patient name: Gila Hernandezarnie  Additional names:

## 2025-05-28 NOTE — PROGRESS NOTES
Subjective:   Patient ID: Gila Neri is a 64 year old female.    Hypertension  This is a chronic problem. The current episode started more than 1 year ago. The problem has been gradually improving since onset. The problem is controlled. Pertinent negatives include no chest pain, orthopnea or shortness of breath. There are no associated agents to hypertension. Risk factors for coronary artery disease include post-menopausal state. The current treatment provides significant improvement.       History/Other:   Review of Systems   Respiratory:  Negative for shortness of breath.    Cardiovascular:  Negative for chest pain and orthopnea.   Current Medications[1]  Allergies:Allergies[2]    Objective:   Physical Exam  Constitutional:       Appearance: Normal appearance.   Cardiovascular:      Rate and Rhythm: Normal rate and regular rhythm.      Pulses: Normal pulses.      Heart sounds: Normal heart sounds.   Pulmonary:      Effort: Pulmonary effort is normal.      Breath sounds: Normal breath sounds.   Musculoskeletal:      Right lower leg: No edema.      Left lower leg: No edema.   Neurological:      General: No focal deficit present.      Mental Status: She is alert and oriented to person, place, and time.      Gait: Gait normal.      Comments: See below       Assessment & Plan:   1. Essential hypertension-blood pressure well-controlled on current medication   2. Visit for screening mammogram-order completed today, due in 7 months.   3. Cervical radiculopathy-working with Dr. Ramirez, scheduled for BRIT injections.   4. Tremor-noted over past few months especially left hand intention tremor.  Worse with caffeine.  Recalls that mother had tremor.  She is not drinking excess alcohol, does not note tremor at rest, change in gait.  On exam high frequency fine intention tremor noted left hand.  Otherwise normal.  Suspect essential tremor or familial tremor.  Rule out thyroid disorder, TSH done today.  Discussed  possible use of beta-blockers, patient declines.   5. Mild intermittent asthma without complication (HCC)-she no longer has a dog which triggered asthma.  Planning to stop Singulair and monitor for any worsening of symptoms.   6. Exposure to viral disease-work form done today.  Plan to check measles titer.       Orders Placed This Encounter   Procedures   • Rubeola(Measles)Antibodies, IGG-Immunity   • TSH [E]       Meds This Visit:  Requested Prescriptions      No prescriptions requested or ordered in this encounter       Imaging & Referrals:  [facility] 2D+3D SCREENING BILAT (CPT=77067/72146)           [1]  Current Outpatient Medications   Medication Sig Dispense Refill   • cyclobenzaprine 10 MG Oral Tab Take 1 tablet (10 mg total) by mouth nightly. 30 tablet 0   • spironolactone 50 MG Oral Tab Take 1 tablet (50 mg total) by mouth daily. 90 tablet 0   • montelukast 10 MG Oral Tab Take 1 tablet (10 mg total) by mouth nightly. 90 tablet 3   • lisinopril 20 MG Oral Tab Take 1 tablet (20 mg total) by mouth daily. 90 tablet 3   • Meloxicam 15 MG Oral Tab Take 1 tablet (15 mg total) by mouth daily. 90 tablet 3   • atorvastatin 40 MG Oral Tab Take 1 tablet (40 mg total) by mouth nightly. 90 tablet 3   • albuterol 108 (90 Base) MCG/ACT Inhalation Aero Soln Inhale 1 puff into the lungs every 4 (four) hours as needed. 1 each 0   • triamcinolone 0.1 % External Ointment Apply to affected area twice a day 30 g 0   • Multiple Vitamins-Minerals (MULTI-VITAMIN GUMMIES) Oral Chew Tab      • Vitamin D3 (VITAMIN D3) 1000 UNITS Oral Tab Take 1 tablet (1,000 Units total) by mouth daily.     [2]  Allergies  Allergen Reactions   • Bronopol SWELLING   • Darvocet [Propoxyphene N-Apap] RASH and SWELLING   • Penicillins RASH   • Sulfa Antibiotics RASH   • Sulfanilamide RASH   • Mastisol Adhesive RASH     Blisters from skin glue   • Other OTHER (SEE COMMENTS)     Adhesive/suture glue -blisters

## 2025-05-29 NOTE — TELEPHONE ENCOUNTER
Patient has been scheduled for C7-T1 Interlaminar Epidural Steroid Injection on 6/10/2025 at the [facility] with Dr. Ramirez.   Anesthesia type:  Local  Please note: The [facility] will call the business day prior to discuss the exact time/arrival and additional instructions for your appointment.  Patient was advised that if he/she does receive the covid vaccine it needs to be at least 2 weeks before or after the injection.  Medications and allergies reviewed.  Educated to hold NSAIDS (Aleve, Ibuprofen, Motrin, Advil) and anti-inflammatories (Meloxicam, Naproxen, Diclofenac, Celebrex) and for cervical injections must hold Multi-Vitamins, Vitamin E, Fish Oil/Omega-3.  Patient informed of [facility]'s  policy:  The patient will require transportation arrangements to and from the procedure, with the  present on site for the entire visit.  Without a , the appointment is subject to cancellation.    [facility] is located in the [facility] [address] [place] [address].   may park in the yellow/purple parking lot.  Patient verbalized understanding and agrees with plan.  Scheduled in Epic: Yes  Scheduled in Surgical Case: Yes  Follow up appointment made: NOV: Visit date not found  Authorization date valid until 11/23/2025 at [facility].

## 2025-05-30 LAB — MEV IGG SER-ACNC: >300 AU/ML (ref 16.5–?)

## 2025-06-10 PROBLEM — M54.12 CERVICAL RADICULOPATHY: Status: ACTIVE | Noted: 2025-06-10

## 2025-06-11 ENCOUNTER — TELEPHONE (OUTPATIENT)
Dept: PHYSICAL MEDICINE AND REHAB | Facility: CLINIC | Age: 65
End: 2025-06-11

## 2025-06-11 RX ORDER — ATORVASTATIN CALCIUM 40 MG/1
40 TABLET, FILM COATED ORAL NIGHTLY
Qty: 90 TABLET | Refills: 3 | Status: SHIPPED | OUTPATIENT
Start: 2025-06-11

## 2025-06-11 RX ORDER — SPIRONOLACTONE 50 MG/1
50 TABLET, FILM COATED ORAL DAILY
Qty: 90 TABLET | Refills: 3 | Status: SHIPPED | OUTPATIENT
Start: 2025-06-11

## 2025-06-11 NOTE — TELEPHONE ENCOUNTER
Refill passed per [facility] protocol.    Please review pended refill request as unable to refill due to High / Very High drug interaction or warning copied here:    Drug-Drug: spironolactone and lisinoprilPotassium-sparing diuretics, such as Potassium-Sparing Diuretics, may enhance the hyperkalemic effect of angiotensin-converting enzyme inhibitors such as Angiotensin-Converting Enzyme Inhibitors.

## 2025-07-14 ENCOUNTER — LAB ENCOUNTER (OUTPATIENT)
Dept: LAB | Facility: HOSPITAL | Age: 65
End: 2025-07-14
Attending: PHYSICAL MEDICINE & REHABILITATION
Payer: COMMERCIAL

## 2025-07-14 ENCOUNTER — OFFICE VISIT (OUTPATIENT)
Dept: PHYSICAL MEDICINE AND REHAB | Facility: CLINIC | Age: 65
End: 2025-07-14
Payer: COMMERCIAL

## 2025-07-14 VITALS — OXYGEN SATURATION: 99 % | WEIGHT: 180 LBS | HEIGHT: 65 IN | BODY MASS INDEX: 29.99 KG/M2 | HEART RATE: 93 BPM

## 2025-07-14 DIAGNOSIS — Z79.1 NSAID LONG-TERM USE: ICD-10-CM

## 2025-07-14 DIAGNOSIS — M54.12 CERVICAL RADICULOPATHY: Primary | ICD-10-CM

## 2025-07-14 DIAGNOSIS — M67.911 ROTATOR CUFF DISORDER, RIGHT: ICD-10-CM

## 2025-07-14 DIAGNOSIS — M54.2 NECK PAIN ON RIGHT SIDE: ICD-10-CM

## 2025-07-14 LAB
ALBUMIN SERPL-MCNC: 5.2 G/DL (ref 3.2–4.8)
ALBUMIN/GLOB SERPL: 2.6 {RATIO} (ref 1–2)
ALP LIVER SERPL-CCNC: 129 U/L (ref 50–130)
ALT SERPL-CCNC: 24 U/L (ref 10–49)
ANION GAP SERPL CALC-SCNC: 8 MMOL/L (ref 0–18)
AST SERPL-CCNC: 20 U/L (ref ?–34)
BILIRUB SERPL-MCNC: 0.7 MG/DL (ref 0.2–1.1)
BUN BLD-MCNC: 12 MG/DL (ref 9–23)
BUN/CREAT SERPL: 17.9 (ref 10–20)
CALCIUM BLD-MCNC: 9.8 MG/DL (ref 8.7–10.4)
CHLORIDE SERPL-SCNC: 97 MMOL/L (ref 98–112)
CO2 SERPL-SCNC: 28 MMOL/L (ref 21–32)
CREAT BLD-MCNC: 0.67 MG/DL (ref 0.55–1.02)
EGFRCR SERPLBLD CKD-EPI 2021: 97 ML/MIN/1.73M2 (ref 60–?)
FASTING STATUS PATIENT QL REPORTED: NO
GLOBULIN PLAS-MCNC: 2 G/DL (ref 2–3.5)
GLUCOSE BLD-MCNC: 99 MG/DL (ref 70–99)
OSMOLALITY SERPL CALC.SUM OF ELEC: 276 MOSM/KG (ref 275–295)
POTASSIUM SERPL-SCNC: 4.2 MMOL/L (ref 3.5–5.1)
PROT SERPL-MCNC: 7.2 G/DL (ref 5.7–8.2)
SODIUM SERPL-SCNC: 133 MMOL/L (ref 136–145)

## 2025-07-14 PROCEDURE — 80053 COMPREHEN METABOLIC PANEL: CPT

## 2025-07-14 PROCEDURE — 36415 COLL VENOUS BLD VENIPUNCTURE: CPT

## 2025-07-14 PROCEDURE — 99214 OFFICE O/P EST MOD 30 MIN: CPT | Performed by: PHYSICAL MEDICINE & REHABILITATION

## 2025-07-14 NOTE — PROGRESS NOTES
RETURN PATIENT VISIT    CHIEF COMPLAINT  Injection follow up    INTERVAL HISTORY  Gila Neri is a 64 year old who was last seen in clinic on 6/10/2025 where she underwent a C7-T1 interlaminar epidural steroid injection.  This was an attempt to address neck and scapular pain as well as concurrent shoulder pain in the setting of possible rotator cuff dysfunction.  She follows up about a month later stating that overall she has had about 75% improvement.  Current level pain is rated 2/10.  She denies any numbness and tingling or weakness.  She is currently using meloxicam and Tylenol as needed at this point.    History of Present Illness  Gila Neri is a 64 year old female with shoulder arthritis and neck issues who presents for follow-up on her symptoms.    She experiences significant improvement in her symptoms, with numbness and tingling in her fingers having subsided. Mild symptoms occasionally occur with certain finger movements, but overall, she feels much better.    Shoulder arthritis continues to cause issues, particularly in the joint, affecting her ability to reach and causing a 'knife in the back' sensation. Activities like mowing the grass and lifting aggravate her shoulder pain. She takes Tylenol for arthritis, which provides relief, and no longer needs a heating pad nightly. She can now sleep on her shoulder, which was previously not possible. Meloxicam is taken daily, and she notices increased joint pain when a dose is missed. She no longer takes a muscle relaxer.    She has a habit of moving furniture, which her daughter advises against. Her last lab work was done in December.      REVIEW OF SYSTEMS  Review of systems was completed with the patient today as pertinent to today's visit    PHYSICAL EXAMINATION  CONSTITUTIONAL: Well-appearing, in no apparent distress  EYES: No scleral icterus or conjunctival hemorrhage  CARDIOVASCULAR: Skin warm and well-perfused, no peripheral  edema  RESPIRATORY: Breathing unlabored without accessory muscle use  PSYCHIATRIC: Alert, cooperative, appropriate mood and affect  SKIN: No lesions or rashes on exposed skin  MUSCULOSKELETAL:   Physical Exam  The range of motion of the bilateral shoulders, some discomfort with abduction of the right shoulder as well as internal/external rotation.  Mildly positive decant testing remains again today.  Good range of motion of the cervical spine, Spurling's maneuver mildly exacerbates discomfort in the neck but no true radicular symptoms elicited with this maneuver.  Well-maintained strength stable from previous visits.      REVIEW OF PRIOR X-RAYS/STUDIES  Independently reviewed the MRI of the cervical spine dated 4/30/2025, reviewed these images with the patient. These reveal arthropathy and foraminal stenosis at C4-5 and left C6-7 with disc bulging at C5-6 and C6-7.     IMPRESSION/DIAGNOSIS  1.   Encounter Diagnoses   Name Primary?    Cervical radiculopathy Yes    Neck pain on right side     Rotator cuff disorder, right          TREATMENT/PLAN  Assessment & Plan  Shoulder arthritis  Chronic shoulder arthritis with improvement on conservative management. Prefers non-surgical approach.  - Continue meloxicam daily.  - Use Tylenol as needed.  - Incorporate home physical therapy exercises for shoulder strength and range of motion.  - Avoid heavy lifting and strenuous activities.  - Draw labs to monitor kidney function due to long-term meloxicam use.    Neck pain  Chronic neck pain. Prefers non-surgical approach.  - Continue current conservative management.  - Monitor symptoms and report any significant changes.  - Would consider repeat cervical interlaminar epidural steroid injection down the line if necessary however at this time she is doing very well.  Follow-up as needed.      Education was provided regarding the above impression/diagnosis and treatment options/plan were discussed.  All questions were answered during  today's visit.  Patient will contact clinic if any other questions or concerns.          Brijesh Ramirez DO  Interventional Spine and Sports Medicine Specialist   Physical Medicine and Rehabilitation  [facility]  [address] [place] [id number]    [address] [place] [address]

## 2025-08-17 ENCOUNTER — APPOINTMENT (OUTPATIENT)
Dept: GENERAL RADIOLOGY | Age: 65
End: 2025-08-17
Attending: PHYSICIAN ASSISTANT

## 2025-08-17 ENCOUNTER — HOSPITAL ENCOUNTER (OUTPATIENT)
Age: 65
Discharge: HOME | End: 2025-08-17

## 2025-08-17 VITALS
HEART RATE: 105 BPM | OXYGEN SATURATION: 99 % | RESPIRATION RATE: 20 BRPM | TEMPERATURE: 99 F | SYSTOLIC BLOOD PRESSURE: 123 MMHG | DIASTOLIC BLOOD PRESSURE: 70 MMHG

## 2025-08-17 DIAGNOSIS — M25.569 KNEE PAIN: Primary | ICD-10-CM

## 2025-08-17 DIAGNOSIS — M71.22 BAKER'S CYST OF KNEE, LEFT: ICD-10-CM

## 2025-08-17 PROCEDURE — A6449 LT COMPRES BAND >=3" <5"/YD: HCPCS | Performed by: PHYSICIAN ASSISTANT

## 2025-08-17 PROCEDURE — 99214 OFFICE O/P EST MOD 30 MIN: CPT | Performed by: PHYSICIAN ASSISTANT

## 2025-08-17 PROCEDURE — 73560 X-RAY EXAM OF KNEE 1 OR 2: CPT | Performed by: PHYSICIAN ASSISTANT

## 2025-08-25 ENCOUNTER — TELEPHONE (OUTPATIENT)
Dept: PHYSICAL MEDICINE AND REHAB | Facility: CLINIC | Age: 65
End: 2025-08-25

## 2025-08-25 ENCOUNTER — OFFICE VISIT (OUTPATIENT)
Dept: PHYSICAL MEDICINE AND REHAB | Facility: CLINIC | Age: 65
End: 2025-08-25

## 2025-08-25 VITALS — HEIGHT: 65 IN | WEIGHT: 180 LBS | BODY MASS INDEX: 29.99 KG/M2

## 2025-08-25 DIAGNOSIS — M17.12 OSTEOARTHRITIS OF LEFT KNEE, UNSPECIFIED OSTEOARTHRITIS TYPE: ICD-10-CM

## 2025-08-25 DIAGNOSIS — M25.462 EFFUSION OF LEFT KNEE: Primary | ICD-10-CM

## 2025-08-25 RX ORDER — LIDOCAINE HYDROCHLORIDE 10 MG/ML
9 INJECTION, SOLUTION INFILTRATION; PERINEURAL ONCE
Status: COMPLETED | OUTPATIENT
Start: 2025-08-25 | End: 2025-08-25

## 2025-08-25 RX ORDER — TRIAMCINOLONE ACETONIDE 40 MG/ML
40 INJECTION, SUSPENSION INTRA-ARTICULAR; INTRAMUSCULAR ONCE
Status: COMPLETED | OUTPATIENT
Start: 2025-08-25 | End: 2025-08-25

## 2025-08-25 RX ADMIN — LIDOCAINE HYDROCHLORIDE 9 ML: 10 INJECTION, SOLUTION INFILTRATION; PERINEURAL at 08:42:00

## 2025-08-25 RX ADMIN — TRIAMCINOLONE ACETONIDE 40 MG: 40 INJECTION, SUSPENSION INTRA-ARTICULAR; INTRAMUSCULAR at 08:42:00

## 2025-08-26 ENCOUNTER — OFFICE VISIT (OUTPATIENT)
Dept: DERMATOLOGY CLINIC | Facility: CLINIC | Age: 65
End: 2025-08-26

## 2025-08-26 DIAGNOSIS — D22.9 MULTIPLE BENIGN NEVI: ICD-10-CM

## 2025-08-26 DIAGNOSIS — L82.1 SEBORRHEIC KERATOSES: Primary | ICD-10-CM

## 2025-08-26 DIAGNOSIS — D18.01 CHERRY ANGIOMA: ICD-10-CM

## 2025-08-26 DIAGNOSIS — L57.0 ACTINIC KERATOSIS: ICD-10-CM

## 2025-08-26 DIAGNOSIS — D49.2 NEOPLASM OF UNSPECIFIED BEHAVIOR OF BONE, SOFT TISSUE, AND SKIN: ICD-10-CM

## 2025-08-26 DIAGNOSIS — L81.4 LENTIGINES: ICD-10-CM

## 2025-08-26 PROCEDURE — 99204 OFFICE O/P NEW MOD 45 MIN: CPT | Performed by: STUDENT IN AN ORGANIZED HEALTH CARE EDUCATION/TRAINING PROGRAM

## 2025-08-26 PROCEDURE — 11102 TANGNTL BX SKIN SINGLE LES: CPT | Performed by: STUDENT IN AN ORGANIZED HEALTH CARE EDUCATION/TRAINING PROGRAM

## 2025-08-26 PROCEDURE — 88305 TISSUE EXAM BY PATHOLOGIST: CPT | Performed by: STUDENT IN AN ORGANIZED HEALTH CARE EDUCATION/TRAINING PROGRAM

## 2025-08-26 PROCEDURE — 17000 DESTRUCT PREMALG LESION: CPT | Performed by: STUDENT IN AN ORGANIZED HEALTH CARE EDUCATION/TRAINING PROGRAM

## 2025-08-28 ENCOUNTER — RESULTS FOLLOW-UP (OUTPATIENT)
Dept: DERMATOLOGY CLINIC | Facility: CLINIC | Age: 65
End: 2025-08-28

## (undated) DEVICE — SUTURE PROLENE 4-0 PS-2

## (undated) DEVICE — MEDI-VAC NON-CONDUCTIVE SUCTION TUBING 6MM X 1.8M (6FT.) L: Brand: CARDINAL HEALTH

## (undated) DEVICE — BLADE SHVR 13CM 4MM EXCLBR

## (undated) DEVICE — SOL  .9 1000ML BTL

## (undated) DEVICE — LINE MNTR ADLT SET O2 INTMD

## (undated) DEVICE — SNARE ENDOSCOPIC 10MM ROUND

## (undated) DEVICE — OCCLUSIVE GAUZE STRIP,3% BISMUTH TRIBROMOPHENATE IN PETROLATUM BLEND: Brand: XEROFORM

## (undated) DEVICE — APPLICATOR CHLORAPREP 26ML

## (undated) DEVICE — SUCTION CANISTER, 3000CC,SAFELINER: Brand: DEROYAL

## (undated) DEVICE — PADDING CAST WYTEX 2\" STER

## (undated) DEVICE — KIT CLEAN ENDOKIT 1.1OZ GOWNX2

## (undated) DEVICE — SUT ETHILON 4-0 FS-2 662G

## (undated) DEVICE — SNARE OPTMZ PLPCTM TRP

## (undated) DEVICE — GAMMEX® PI HYBRID SIZE 7.5, STERILE POWDER-FREE SURGICAL GLOVE, POLYISOPRENE AND NEOPRENE BLEND: Brand: GAMMEX

## (undated) DEVICE — WEBRIL COTTON UNDERCAST PADDING: Brand: WEBRIL

## (undated) DEVICE — GAMMEX® NON-LATEX PI ORTHO SIZE 8, STERILE POLYISOPRENE POWDER-FREE SURGICAL GLOVE: Brand: GAMMEX

## (undated) DEVICE — ARTHROSCOPY: Brand: MEDLINE INDUSTRIES, INC.

## (undated) DEVICE — 1010 S-DRAPE TOWEL DRAPE 10/BX: Brand: STERI-DRAPE™

## (undated) DEVICE — CASED DISP BIPOLAR CORD

## (undated) DEVICE — TUBING IRR 16FT CNT WV 3 ASCP

## (undated) DEVICE — FIBERWIRE 2.0 AR-7220

## (undated) DEVICE — ENCORE® LATEX MICRO SIZE 6.5, STERILE LATEX POWDER-FREE SURGICAL GLOVE: Brand: ENCORE

## (undated) DEVICE — MEDI-VAC NON-CONDUCTIVE SUCTION TUBING: Brand: CARDINAL HEALTH

## (undated) DEVICE — SOL H2O 1000ML BTL

## (undated) DEVICE — DISPOSABLE TOURNIQUET CUFF SINGLE BLADDER, DUAL PORT AND QUICK CONNECT CONNECTOR: Brand: COLOR CUFF

## (undated) DEVICE — 3M™ STERI-STRIP™ REINFORCED ADHESIVE SKIN CLOSURES, R1547, 1/2 IN X 4 IN (12 MM X 100 MM), 6 STRIPS/ENVELOPE: Brand: 3M™ STERI-STRIP™

## (undated) DEVICE — 3M™ TEGADERM™ TRANSPARENT FILM DRESSING FRAME STYLE 1629: Brand: 3M™ TEGADERM™

## (undated) DEVICE — KIT ENDO ORCAPOD 160/180/190

## (undated) DEVICE — Device: Brand: DUAL NARE NASAL CANNULAE FEMALE LUER CON 7FT O2 TUBE

## (undated) DEVICE — SUTURE VICRYL 3-0 J497G

## (undated) DEVICE — DRAPE MN CARM

## (undated) DEVICE — SUTURE MONOCRYL 4-0 Y845G

## (undated) DEVICE — AMBIENT SUPER MULTIVAC 50 WITH                                    INTEGRATED FINGER SWITCHES IFS: Brand: COBLATION

## (undated) DEVICE — 60 ML SYRINGE REGULAR TIP: Brand: MONOJECT

## (undated) DEVICE — GAUZE SPONGES,12 PLY: Brand: CURITY

## (undated) DEVICE — STERILE LATEX POWDER-FREE SURGICAL GLOVESWITH NITRILE COATING: Brand: PROTEXIS

## (undated) DEVICE — Device: Brand: DEFENDO AIR/WATER/SUCTION AND BIOPSY VALVE

## (undated) DEVICE — Device: Brand: CUSTOM PROCEDURE KIT

## (undated) DEVICE — UPPER EXTREMITY: Brand: MEDLINE INDUSTRIES, INC.

## (undated) DEVICE — SOLUTION  .9 3000ML

## (undated) DEVICE — GAMMEX® NON-LATEX PI ORTHO SIZE 7, STERILE POLYISOPRENE POWDER-FREE SURGICAL GLOVE: Brand: GAMMEX

## (undated) DEVICE — GAMMEX® PI HYBRID SIZE 6.5, STERILE POWDER-FREE SURGICAL GLOVE, POLYISOPRENE AND NEOPRENE BLEND: Brand: GAMMEX

## (undated) DEVICE — 35 ML SYRINGE REGULAR TIP: Brand: MONOJECT

## (undated) NOTE — LETTER
12/13/2022          To Whom It May Concern:    Maxine Herrera is currently under my medical care. She may return to work on 12/19/22 with the following restrictions: No more than 6 hours of work per day/30 hours per week. No lifting greater than 10lbs. No kneeling or squatting. These restrictions are for 3 weeks. After 3 weeks she may return to work with no restrictions. If you require additional information please contact our office.         Sincerely,    Sil Rogers MD

## (undated) NOTE — LETTER
AUTHORIZATION FOR SURGICAL OPERATION OR OTHER PROCEDURE    1.  I hereby authorize Dr. Jessica Cool , and [facility] staff assigned to my case to perform the following operation and/or procedure at the [facility]:    ___________________________ Patient Name:  ______________________________________________________  (please print)      Patient signature:  ___________________________________________________             Relationship to Patient:           []  Parent    Responsible person

## (undated) NOTE — LETTER
Nelson Lloyd Md  [facility]  [address] [place] [address] [facility]       06/18/19        Patient: Nile Esposito   YOB: 1960   Date of Visit: 6/18/2019       Dear  Dr. Willian Leon MD,      Thank you for referring Nile Esposito to my p

## (undated) NOTE — LETTER
7/30/2019    Jhoana Ledesma        [facility]            Dear Jhoana Ledesma,      Our records indicate that you are due for an appointment for a Colonoscopy in August 2019, or shortly there after, with [facility]

## (undated) NOTE — LETTER
[address], [place], [place]  Authorization for Invasive Procedure                                                                                           1. I hereby authorize Jordin Gomez MD, my physician and his/her assistants (if applicable), which may include medical students, residents, and/or fellows, to perform the following surgical operation/ procedure and administer such anesthesia as may be determined necessary by my physician: Operation/Procedure name (s) COLONOSCOPY on Sandeep Rufe   2. I recognize that during the surgical operation/procedure, unforeseen conditions may necessitate additional or different procedures than those listed above. I, therefore, further authorize and request that the above-named surgeon, assistants, or designees perform such procedures as are, in their judgment, necessary and desirable. 3.   My surgeon/physician has discussed prior to my surgery the potential benefits, risks and side effects of this procedure; the likelihood of achieving goals; and potential problems that might occur during recuperation. They also discussed reasonable alternatives to the procedure, including risks, benefits, and side effects related to the alternatives and risks related to not receiving this procedure. I have had all my questions answered and I acknowledge that no guarantee has been made as to the result that may be obtained. 4.   Should the need arise during my operation/procedure, which includes change of level of care prior to discharge, I also consent to the administration of blood and/or blood products. Further, I understand that despite careful testing and screening of blood or blood products by collecting agencies, I may still be subject to ill effects as a result of receiving a blood transfusion and/or blood products.   The following are some, but not all, of the potential risks that can occur: fever and allergic reactions, hemolytic reactions, transmission of diseases such as Hepatitis, AIDS and Cytomegalovirus (CMV) and fluid overload. In the event that I wish to have an autologous transfusion of my own blood, or a directed donor transfusion, I will discuss this with my physician. Check only if Refusing Blood or Blood Products  I understand refusal of blood or blood products as deemed necessary by my physician may have serious consequences to my condition to include possible death. I hereby assume responsibility for my refusal and release the hospital, its personnel, and my physicians from any responsibility for the consequences of my refusal.    o  Refuse   5. I authorize the use of any specimen, organs, tissues, body parts or foreign objects that may be removed from my body during the operation/procedure for diagnosis, research or teaching purposes and their subsequent disposal by hospital authorities. I also authorize the release of specimen test results and/or written reports to my treating physician on the hospital medical staff or other referring or consulting physicians involved in my care, at the discretion of the Pathologist or my treating physician. 6.   I consent to the photographing or videotaping of the operations or procedures to be performed, including appropriate portions of my body for medical, scientific, or educational purposes, provided my identity is not revealed by the pictures or by descriptive texts accompanying them. If the procedure has been photographed/videotaped, the surgeon will obtain the original picture, image, videotape or CD. The hospital will not be responsible for storage, release or maintenance of the picture, image, tape or CD.    7.   I consent to the presence of a  or observers in the operating room as deemed necessary by my physician or their designees.     8.   I recognize that in the event my procedure results in extended X-Ray/fluoroscopy time, I may develop a skin reaction. 9. If I have a Do Not Attempt Resuscitation (DNAR) order in place, that status will be suspended while in the operating room, procedural suite, and during the recovery period unless otherwise explicitly stated by me (or a person authorized to consent on my behalf). The surgeon or my attending physician will determine when the applicable recovery period ends for purposes of reinstating the DNAR order. 10. Patients having a sterilization procedure: I understand that if the procedure is successful the results will be permanent and it will therefore be impossible for me to inseminate, conceive, or bear children. I also understand that the procedure is intended to result in sterility, although the result has not been guaranteed. 11. I acknowledge that my physician has explained sedation/analgesia administration to me including the risk and benefits I consent to the administration of sedation/analgesia as may be necessary or desirable in the judgment of my physician. I CERTIFY THAT I HAVE READ AND FULLY UNDERSTAND THE ABOVE CONSENT TO OPERATION and/or OTHER PROCEDURE.     _________________________________________ _________________________________     ___________________________________  Signature of Patient     Signature of Responsible Person                   Printed Name of Responsible Person                              _________________________________________ ______________________________        ___________________________________  Signature of Witness         Date  Time         Relationship to Patient    STATEMENT OF PHYSICIAN My signature below affirms that prior to the time of the procedure; I have explained to the patient and/or his/her legal representative, the risks and benefits involved in the proposed treatment and any reasonable alternative to the proposed treatment.  I have also explained the risks and benefits involved in refusal of the proposed treatment and alternatives to the proposed treatment and have answered the patient's questions.  If I have a significant financial interest in a co-management agreement or a significant financial interest in any product or implant, or other significant relationship used in this procedure/surgery, I have disclosed this and had a discussion with my patient.     _______________________________________________________________ _____________________________  Kendrick El)                                                                                         (Date)                                   (Time)  Patient Name: Becky Sainz    : 10/6/1960   Printed: 2023      Medical Record #: [id number]                                              Page 1 of 1

## (undated) NOTE — LETTER
Date: 11/18/2019    Patient Name: Vicki Laura          To Whom it may concern: This letter has been written at the patient's request. The above patient was seen at the [facility] for treatment of a medical condition.     This patient s

## (undated) NOTE — MR AVS SNAPSHOT
[facility]  [address], [address] [place] [address] [place]  [phone]               Thank you for choosing us for your health care visit with Raven Hodge.  Hussain Valera MD.  We are glad to serve you and happy to provide you with this summary What changed:  Another medication with the same name was removed. Continue taking this medication, and follow the directions you see here.    Commonly known as:  PRINIVIL,ZESTRIL           Metoprolol Succinate  MG Tb24   TAKE 1 TABLET BY MOUTH EVERY D Imaging:  LIZBETH SCREENING BILAT ([id number]=[id number])    Instructions:   To schedule a test at any [facility], call Central Scheduling at [phone], Monday through Friday between 7:30am to 6pm and on Saturday between 8am and Olga Robles numbers can create reimbursement difficulties for you.     Assoc Dx:  Right shoulder pain, unspecified chronicity [M25.511], Neuropathy of right upper extremity [G56.91]          MyChart     Visit MyChart  You can access your MyChart to more actively manage increments are effective and add up over the week   2 ½ hours per week – spread out over time Use a cornelius to keep you motivated   Don’t forget strength training with weights and resistance Set goals and track your progress   You don’t need to join a gym.

## (undated) NOTE — LETTER
AUTHORIZATION FOR SURGICAL OPERATION OR OTHER PROCEDURE    1.  I hereby [facility] Dr. Oscar Thorpe , and [facility] staff assigned to my case to perform the following operation and/or procedure at the [facility]:    ___________________________ Patient Name:  ______________________________________________________  (please print)      Patient signature:  ___________________________________________________             Relationship to Patient:           []  Parent    Responsible person

## (undated) NOTE — LETTER
5/10/2018              Meganluis Osler        [address] [id number]         To Whom It May Concern,    Meganluis Osler is under my medical care and was seen in the office today. Please excuse her absence.  She may return when symp

## (undated) NOTE — LETTER
Date: 6/7/2019    Patient Name: Maverick Lowe          To Whom it may concern: This letter has been written at the patient's request. The above patient was seen at the [facility] for treatment of a medical condition.     This patient eloisa

## (undated) NOTE — Clinical Note
ASTHMA ACTION PLAN for Bhakti Kimble     : 10/6/1960     Date: 2017  Doctor: Nae Verdin MD  Phone for doctor or clinic: Tj Davison , [address], [address] [address] [address], [address] [phone]      ACT Sco Nose opens wide  Can't walk  Ribs show  Can't talk well Medicine How much to take When to take it    If your symptoms do not improve in ONE hour -  go to the emergency room or call 911 immediately!  If symptoms improve, call office for appointment immediate

## (undated) NOTE — LETTER
AUTHORIZATION FOR SURGICAL OPERATION OR OTHER PROCEDURE    1. I hereby authorize Dr. Alberto Avery, and [facility] staff assigned to my case to perform the following operation and/or procedure at the [facility]:    _______________________________________________________________________________________________    Left Knee cortisone injection   _______________________________________________________________________________________________    2. My physician has explained the nature and purpose of the operation or other procedure, possible alternative methods of treatment, the risks involved, and the possibility of complication to me. I acknowledge that no guarantee has been made as to the result that may be obtained. 3.  I recognize that, during the course of this operation, or other procedure, unforseen conditions may necessitate additional or different procedure than those listed above. I, therefore, further authorize and request that the above named physician, his/her physician assistants or designees perform such procedures as are, in his/her professional opinion, necessary and desirable. 4.  Any tissue or organs removed in the operation or other procedure may be disposed of by and at the discretion of the [facility] and [facility] AT [facility]. 5.  I understand that in the event of a medical emergency, I will be transported by local paramedics to [facility] or other hospital emergency department. 6.  I certify that I have read and fully understand the above consent to operation and/or other procedure. 7.  I acknowledge that my physician has explained sedation/analgesia administration to me including the risks and benefits. I consent to the administration of sedation/analgesia as may be necessary or desirable in the judgement of my physician.     Witness signature: ___________________________________________________ Date:  ______/______/_____                    Time: ________ A. M.  P.M. Patient Name:  ______________________________________________________  (please print)      Patient signature:  ___________________________________________________             Relationship to Patient:           []  Parent    Responsible person                          []  Spouse  In case of minor or                    [] Other  _____________   Incompetent name:  __________________________________________________                               (please print)      _____________      Responsible person  In case of minor or  Incompetent signature:  _______________________________________________    Statement of Physician  My signature below affirms that prior to the time of the procedure, I have explained to the patient and/or his/her guardian, the risks and benefits involved in the proposed treatment and any reasonable alternative to the proposed treatment. I have also explained the risks and benefits involved in the refusal of the proposed treatment and have answered the patient's questions.                         Date:  ______/______/_______  Provider                      Signature:  __________________________________________________________       Time:  ___________ A.M    P.M.

## (undated) NOTE — LETTER
6/4/2019              Kaela Brumfield        [address] [id number]         To Whom It May Concern,    Kaela Brumfield is under my medical care and was seen in the office today. Please excuse any absence.  She may return when imp

## (undated) NOTE — LETTER
9/1/2022    Ashley Miles        [address] [id number]            Dear Ashley Miles,      Our records indicate that you are due for an appointment for a Colonoscopy with Maria Luisa Mauricio MD. Our doctors are booking out about 3-5 months for procedures. Please call our office to schedule this appointment. Your medical well-being is important to us. If your insurance requires a referral, please call your primary care office to request one.       Thank you,      The Physicians and Staff at [facility]

## (undated) NOTE — LETTER
AUTHORIZATION FOR SURGICAL OPERATION OR OTHER PROCEDURE    1. I hereby authorize Dr. Vern Kenyon, and [facility] staff assigned to my case to perform the following operation and/or procedure at the [facility]:    _______________________________________________________________________________________________    Cortisone injection of the left knee  _______________________________________________________________________________________________    2. My physician has explained the nature and purpose of the operation or other procedure, possible alternative methods of treatment, the risks involved, and the possibility of complication to me. I acknowledge that no guarantee has been made as to the result that may be obtained. 3.  I recognize that, during the course of this operation, or other procedure, unforseen conditions may necessitate additional or different procedure than those listed above. I, therefore, further authorize and request that the above named physician, his/her physician assistants or designees perform such procedures as are, in his/her professional opinion, necessary and desirable. 4.  Any tissue or organs removed in the operation or other procedure may be disposed of by and at the discretion of the [facility] and [facility] AT [facility]. 5.  I understand that in the event of a medical emergency, I will be transported by local paramedics to [facility] or other hospital emergency department. 6.  I certify that I have read and fully understand the above consent to operation and/or other procedure. 7.  I acknowledge that my physician has explained sedation/analgesia administration to me including the risks and benefits. I consent to the administration of sedation/analgesia as may be necessary or desirable in the judgement of my physician.     Witness signature: ___________________________________________________ Date:  ______/______/_____ Time:  ________ A. M.  P.M. Patient Name:  ______________________________________________________  (please print)      Patient signature:  ___________________________________________________             Relationship to Patient:           []  Parent    Responsible person                          []  Spouse  In case of minor or                    [] Other  _____________   Incompetent name:  __________________________________________________                               (please print)      _____________      Responsible person  In case of minor or  Incompetent signature:  _______________________________________________    Statement of Physician  My signature below affirms that prior to the time of the procedure, I have explained to the patient and/or his/her guardian, the risks and benefits involved in the proposed treatment and any reasonable alternative to the proposed treatment. I have also explained the risks and benefits involved in the refusal of the proposed treatment and have answered the patient's questions.                         Date:  ______/______/_______  Provider                      Signature:  __________________________________________________________       Time:  ___________ A.M    P.M.

## (undated) NOTE — MR AVS SNAPSHOT
[address] [address] [phone]               Thank you for choosing us for your health care visit with Blair Amato PA-C.   We are glad to serve you and happy to provide you with this summary of yo Gently wash the area around your vagina when you bathe or shower. Wear cotton underwear. Use pantyhose with cotton crotches. Avoid tight clothing. Wear loose pants. Do not wear a wet bathing suit for a long time.          Allergies as of Jun 12, 201 * Notice: This list has 2 medication(s) that are the same as other medications prescribed for you. Read the directions carefully, and ask your doctor or other care provider to review them with you.          Where to Get Your Medications      These medicat

## (undated) NOTE — LETTER
11/11/2019              Jasmin Julio        [address] [place] [id number]          Dear Christi Rascon,    During your colonoscopy exam at [facility] on 11/5/19 Dr. Tera Arrieta found 6 polyps which were removed.      4 of these karla

## (undated) NOTE — LETTER
AUTHORIZATION FOR SURGICAL OPERATION OR OTHER PROCEDURE    1. I hereby authorize Carlton Sweet and [facility] staff assigned to my case to perform the following operation and/or procedure at the [facility] site:    Cortisone injection in Right shoulder   _______________________________________________________________________________________________      _______________________________________________________________________________________________    2.  My physician has explained the nature and purpose of the operation or other procedure, possible alternative methods of treatment, the risks involved, and the possibility of complication to me.  I acknowledge that no guarantee has been made as to the result that may be obtained.  3.  I recognize that, during the course of this operation, or other procedure, unforseen conditions may necessitate additional or different procedure than those listed above.  I, therefore, further authorize and request that the above named physician, his/her physician assistants or designees perform such procedures as are, in his/her professional opinion, necessary and desirable.  4.  Any tissue or organs removed in the operation or other procedure may be disposed of by and at the discretion of the [facility] and [facility].  5.  I understand that in the event of a medical emergency, I will be transported by local paramedics to [facility] or other hospital emergency department.  6.  I certify that I have read and fully understand the above consent to operation and/or other procedure.    7.  I acknowledge that my physician has explained sedation/analgesia administration to me including the risks and benefits.  I consent to the administration of sedation/analgesia as may be necessary or desirable in the judgement of my physician.    Witness signature: ___________________________________________________ Date:   ______/______/_____                    Time:  ________ A.M.  P.M.       Patient Name:  ______________________________________________________  (please print)      Patient signature:  ___________________________________________________             Relationship to Patient:           []  Parent    Responsible person                          []  Spouse  In case of minor or                    [] Other  _____________   Incompetent name:  __________________________________________________                               (please print)      _____________      Responsible person  In case of minor or  Incompetent signature:  _______________________________________________    Statement of Physician  My signature below affirms that prior to the time of the procedure, I have explained to the patient and/or his/her guardian, the risks and benefits involved in the proposed treatment and any reasonable alternative to the proposed treatment.  I have also explained the risks and benefits involved in the refusal of the proposed treatment and have answered the patient's questions.                        Date:  ______/______/_______  Provider                      Signature:  __________________________________________________________       Time:  ___________ A.M    P.M.

## (undated) NOTE — LETTER
Date & Time: 10/26/2022, 5:07 PM  Patient: Alissa Salcedo  Encounter Provider(s):    DAVE Malone       To Whom It May Concern:    Julián George was seen and treated in our department on 10/26/2022. She should not return to work until 10/28/2022.     If you have any questions or concerns, please do not hesitate to call.        _____________________________  Physician/APC Signature

## (undated) NOTE — LETTER
Date & Time: 3/24/2025, 7:28 PM  Patient: Gila Neri  Encounter Provider(s):    Brock Fortune APRN       To Whom It May Concern:    Gila Neri was seen and treated in our department on 3/24/2025. She should not return to work until she has had no diarrhea for 24 hours .    If you have any questions or concerns, please do not hesitate to call.        _____________________________  Physician/APC Signature